# Patient Record
Sex: MALE | Race: WHITE | Employment: OTHER | URBAN - METROPOLITAN AREA
[De-identification: names, ages, dates, MRNs, and addresses within clinical notes are randomized per-mention and may not be internally consistent; named-entity substitution may affect disease eponyms.]

---

## 2017-09-07 PROBLEM — E78.2 MIXED HYPERLIPIDEMIA: Status: ACTIVE | Noted: 2017-09-07

## 2017-09-07 PROBLEM — G47.00 INSOMNIA: Status: ACTIVE | Noted: 2017-09-07

## 2017-09-07 PROBLEM — I10 ESSENTIAL HYPERTENSION: Status: ACTIVE | Noted: 2017-09-07

## 2017-10-10 PROBLEM — R07.81 RIB PAIN ON RIGHT SIDE: Status: ACTIVE | Noted: 2017-10-10

## 2017-10-10 PROBLEM — R10.11 RUQ ABDOMINAL PAIN: Status: ACTIVE | Noted: 2017-10-10

## 2017-10-10 PROBLEM — R05.3 CHRONIC COUGH: Status: ACTIVE | Noted: 2017-10-10

## 2017-11-30 PROBLEM — K57.30 DIVERTICULOSIS OF COLON: Status: ACTIVE | Noted: 2017-11-30

## 2018-02-27 PROBLEM — G89.29 CHRONIC NONINTRACTABLE HEADACHE: Status: ACTIVE | Noted: 2018-02-27

## 2018-02-27 PROBLEM — R51.9 CHRONIC NONINTRACTABLE HEADACHE: Status: ACTIVE | Noted: 2018-02-27

## 2018-02-27 PROBLEM — R05.3 CHRONIC COUGH: Status: RESOLVED | Noted: 2017-10-10 | Resolved: 2018-02-27

## 2018-04-03 PROBLEM — G89.29 CHRONIC NONINTRACTABLE HEADACHE: Status: RESOLVED | Noted: 2018-02-27 | Resolved: 2018-04-03

## 2018-04-03 PROBLEM — R51.9 CHRONIC NONINTRACTABLE HEADACHE: Status: RESOLVED | Noted: 2018-02-27 | Resolved: 2018-04-03

## 2018-05-03 PROBLEM — I73.9 PAD (PERIPHERAL ARTERY DISEASE) (HCC): Status: ACTIVE | Noted: 2018-05-03

## 2018-05-03 PROBLEM — R10.11 RUQ ABDOMINAL PAIN: Status: RESOLVED | Noted: 2017-10-10 | Resolved: 2018-05-03

## 2018-05-03 PROBLEM — M54.10 RADICULOPATHY OF LEG: Status: ACTIVE | Noted: 2018-05-03

## 2018-07-23 ENCOUNTER — OFFICE VISIT (OUTPATIENT)
Dept: GASTROENTEROLOGY | Age: 68
End: 2018-07-23
Payer: COMMERCIAL

## 2018-07-23 VITALS
BODY MASS INDEX: 33.1 KG/M2 | HEART RATE: 77 BPM | OXYGEN SATURATION: 98 % | DIASTOLIC BLOOD PRESSURE: 76 MMHG | WEIGHT: 223.5 LBS | SYSTOLIC BLOOD PRESSURE: 144 MMHG | HEIGHT: 69 IN

## 2018-07-23 DIAGNOSIS — R63.4 WEIGHT LOSS: ICD-10-CM

## 2018-07-23 DIAGNOSIS — K57.30 DIVERTICULOSIS OF COLON: Primary | ICD-10-CM

## 2018-07-23 DIAGNOSIS — R10.30 LOWER ABDOMINAL PAIN: ICD-10-CM

## 2018-07-23 PROBLEM — R10.9 ABDOMINAL PAIN: Status: ACTIVE | Noted: 2018-07-23

## 2018-07-23 PROCEDURE — 99204 OFFICE O/P NEW MOD 45 MIN: CPT | Performed by: INTERNAL MEDICINE

## 2018-07-23 ASSESSMENT — ENCOUNTER SYMPTOMS
ABDOMINAL PAIN: 1
COLOR CHANGE: 0
CONSTIPATION: 1
CHEST TIGHTNESS: 0
DIARRHEA: 1
TROUBLE SWALLOWING: 0
BACK PAIN: 1
BLOOD IN STOOL: 0
ABDOMINAL DISTENTION: 1
SORE THROAT: 1
STRIDOR: 0
SHORTNESS OF BREATH: 0
SINUS PAIN: 1
VOMITING: 0
COUGH: 1
NAUSEA: 0
SINUS PRESSURE: 1

## 2018-07-23 NOTE — PROGRESS NOTES
Subjective:      Patient ID: Gabby Lau is a 69 y.o. male. HPI   Dr. Chemo Marie PA-C our mutual patient Gabby Lau was seen  for   1. Diverticulosis of colon    2. Weight loss    3. Lower abdominal pain     . Mr. Lashawn Rivera, 64-year-old gentleman seen in the office with the symptoms of abdominal pain, weight loss. Patient states that he is having pelvic pain in the last 2-3 months. The pain appears to be crampy, constant. The pain is relieved by bowel movements. He does have constipation. No melena hematochezia. No fever or chills. Has nausea. Also has abdominal bloating and feeling of excessive gas. Recently he has weight loss of about 25 pounds. He has a good appetite. No dysphagia. No dyspepsia. Denies urinary symptoms. No fever or chills. His a previous records reviewed and this patient was seen by me last time about 3 years ago. In 2015 he had a colonoscopy done, and was found to have diverticulosis and hemorrhoids. In the last 3 years he was doing reasonably well until present symptoms. He has chronic disabling back pain. Past Medical, Family, and Social History reviewed and does contribute to the patient presenting condition. patient\"s PMH/PSH,SH,PSYCH hx, MEDs, ALLERGIES, and ROS was all reviewed and updated ion the appropriate sections      Review of Systems   Constitutional: Positive for unexpected weight change (LOSS OF 26 LBS IN TWO MONTHS). Negative for chills, fatigue and fever. HENT: Positive for sinus pain, sinus pressure and sore throat. Negative for dental problem (dentures), tinnitus and trouble swallowing. Eyes: Positive for visual disturbance (glasses). Respiratory: Positive for cough. Negative for chest tightness, shortness of breath and stridor. Cardiovascular: Positive for leg swelling (bilateral ankles). Negative for chest pain and palpitations.    Gastrointestinal: Positive for abdominal distention, abdominal pain, constipation and diarrhea. Negative for blood in stool, nausea and vomiting. Endocrine: Negative for cold intolerance and heat intolerance. Genitourinary: Positive for frequency and urgency. Negative for difficulty urinating. Musculoskeletal: Positive for arthralgias, back pain, neck pain and neck stiffness. Skin: Negative for color change, rash and wound. Allergic/Immunologic: Negative for environmental allergies and food allergies. Neurological: Positive for headaches (sinus). Negative for tremors, seizures, syncope, weakness, light-headedness and numbness. Hematological: Bruises/bleeds easily. Psychiatric/Behavioral: Negative for sleep disturbance. The patient is nervous/anxious. Objective:   Physical Exam   Constitutional: He is oriented to person, place, and time. He appears well-developed and well-nourished. No distress. Moderately overweight patient. HENT:   Head: Normocephalic and atraumatic. Mouth/Throat: No oropharyngeal exudate. Eyes: Conjunctivae are normal. Pupils are equal, round, and reactive to light. No scleral icterus. Neck: Normal range of motion. Neck supple. No tracheal deviation present. No thyromegaly present. Cardiovascular: Normal rate, regular rhythm, normal heart sounds and intact distal pulses. No murmur heard. Pulmonary/Chest: Effort normal and breath sounds normal. No respiratory distress. He has no wheezes. He has no rales. Abdominal: Soft. Bowel sounds are normal. He exhibits no distension, no ascites and no mass. There is no hepatomegaly. There is no tenderness. There is no guarding. No hernia. Obese abdomen. No peripheral signs of ch. Liver disease   Genitourinary: Rectum normal. Guaiac stool: no mass felt in the rectum. Stool negative for occult blood. Musculoskeletal: He exhibits no edema. Lymphadenopathy:     He has no cervical adenopathy. Neurological: He is alert and oriented to person, place, and time.  No cranial nerve

## 2018-07-30 ENCOUNTER — TELEPHONE (OUTPATIENT)
Dept: GASTROENTEROLOGY | Age: 68
End: 2018-07-30

## 2018-08-03 PROBLEM — R51.9 CHRONIC DAILY HEADACHE: Status: ACTIVE | Noted: 2018-08-03

## 2018-08-17 ENCOUNTER — OFFICE VISIT (OUTPATIENT)
Dept: GASTROENTEROLOGY | Age: 68
End: 2018-08-17
Payer: COMMERCIAL

## 2018-08-17 VITALS
DIASTOLIC BLOOD PRESSURE: 79 MMHG | SYSTOLIC BLOOD PRESSURE: 135 MMHG | WEIGHT: 241 LBS | HEART RATE: 100 BPM | OXYGEN SATURATION: 97 % | BODY MASS INDEX: 35.7 KG/M2 | HEIGHT: 69 IN

## 2018-08-17 DIAGNOSIS — R10.30 LOWER ABDOMINAL PAIN: ICD-10-CM

## 2018-08-17 DIAGNOSIS — K57.30 DIVERTICULOSIS OF COLON: ICD-10-CM

## 2018-08-17 DIAGNOSIS — R63.4 WEIGHT LOSS: ICD-10-CM

## 2018-08-17 DIAGNOSIS — K21.9 GASTROESOPHAGEAL REFLUX DISEASE, ESOPHAGITIS PRESENCE NOT SPECIFIED: Primary | ICD-10-CM

## 2018-08-17 PROCEDURE — 99214 OFFICE O/P EST MOD 30 MIN: CPT | Performed by: INTERNAL MEDICINE

## 2018-08-17 ASSESSMENT — ENCOUNTER SYMPTOMS
ABDOMINAL PAIN: 1
ABDOMINAL DISTENTION: 1
SORE THROAT: 1
ANAL BLEEDING: 0
DIARRHEA: 1
RECTAL PAIN: 0
COUGH: 1
SINUS PRESSURE: 1
CONSTIPATION: 1
VOMITING: 0
SINUS PAIN: 1
BLOOD IN STOOL: 0
BACK PAIN: 1
NAUSEA: 0

## 2018-08-17 NOTE — PROGRESS NOTES
Objective:   Physical Exam   Constitutional: He is oriented to person, place, and time. He appears well-developed and well-nourished. No distress. HENT:   Head: Normocephalic and atraumatic. Mouth/Throat: No oropharyngeal exudate. Eyes: Pupils are equal, round, and reactive to light. Conjunctivae are normal. No scleral icterus. Neck: Normal range of motion. Neck supple. No tracheal deviation present. No thyromegaly present. Cardiovascular: Normal rate, regular rhythm, normal heart sounds and intact distal pulses. No murmur heard. Pulmonary/Chest: Effort normal and breath sounds normal. No respiratory distress. He has no wheezes. He has no rales. Abdominal: Soft. Bowel sounds are normal. He exhibits no distension, no ascites and no mass. There is no hepatomegaly. There is no tenderness. There is no guarding. No hernia. No peripheral signs of ch. Liver disease   Genitourinary: Rectum normal.   Musculoskeletal: He exhibits no edema. Walks with a cane/walker. Lymphadenopathy:     He has no cervical adenopathy. Neurological: He is alert and oriented to person, place, and time. No cranial nerve deficit. Skin: Skin is warm and dry. No rash noted. No erythema. Psychiatric: Thought content normal.   Nursing note and vitals reviewed. Assessment:       Diagnosis Orders   1. Gastroesophageal reflux disease, esophagitis presence not specified     2. Diverticulosis of colon     3. Lower abdominal pain     4. Weight loss             Plan: At present patient is new symptom is that he has a good. I advised him to follow antireflux measures. Advised to increase PPI therapy. We'll watch him in the next 6-8 weeks to see the improvement. His weight loss appears to be stabilized. In fact he gained some weight. His pain in the pelvis better. However he continued to have some discomfort. Discussed with the patient and wife regarding this.   If he continued to have left flank and pelvic pain, he may need colonoscopy and decide whether he needs sigmoid colectomy. My thinking is that his symptom is secondary to diverticular disease. He might have developed mild stricture as well. After discussion they understood and they want to wait 2-3 months to see how he does. In between if he gets worsening of the symptoms they will be in touch with me. Otherwise in 3 months we'll decide further management.         Lou Betancourt MA

## 2018-12-13 ENCOUNTER — TELEPHONE (OUTPATIENT)
Dept: PRIMARY CARE CLINIC | Age: 68
End: 2018-12-13

## 2018-12-13 ENCOUNTER — OFFICE VISIT (OUTPATIENT)
Dept: GASTROENTEROLOGY | Age: 68
End: 2018-12-13
Payer: COMMERCIAL

## 2018-12-13 VITALS
HEART RATE: 81 BPM | BODY MASS INDEX: 35.59 KG/M2 | SYSTOLIC BLOOD PRESSURE: 159 MMHG | DIASTOLIC BLOOD PRESSURE: 91 MMHG | WEIGHT: 241 LBS

## 2018-12-13 DIAGNOSIS — K21.9 GASTROESOPHAGEAL REFLUX DISEASE, ESOPHAGITIS PRESENCE NOT SPECIFIED: ICD-10-CM

## 2018-12-13 DIAGNOSIS — K57.30 DIVERTICULOSIS OF COLON: Primary | ICD-10-CM

## 2018-12-13 DIAGNOSIS — R63.4 WEIGHT LOSS: ICD-10-CM

## 2018-12-13 PROCEDURE — 99214 OFFICE O/P EST MOD 30 MIN: CPT | Performed by: INTERNAL MEDICINE

## 2018-12-13 RX ORDER — LORAZEPAM 0.5 MG/1
0.5 TABLET ORAL EVERY 6 HOURS PRN
COMMUNITY
End: 2019-01-28 | Stop reason: SDUPTHER

## 2018-12-13 ASSESSMENT — ENCOUNTER SYMPTOMS
NAUSEA: 0
BACK PAIN: 1
ABDOMINAL DISTENTION: 0
SINUS PRESSURE: 1
BLOOD IN STOOL: 0
RECTAL PAIN: 0
CONSTIPATION: 0
SINUS PAIN: 1
DIARRHEA: 0
COUGH: 1
VOMITING: 0
ABDOMINAL PAIN: 0
ANAL BLEEDING: 0
SORE THROAT: 1

## 2018-12-13 NOTE — TELEPHONE ENCOUNTER
Please call back with BP that is taken at Dr. Gage Nip office today and take the cuff they have to check it against the BP there at his office. If still that high and feels ok, I would like to see tomorrow. If that high and dizzy, weak on one side, chest pain, SOB, ets, go to ER.

## 2018-12-20 ENCOUNTER — TELEPHONE (OUTPATIENT)
Dept: GASTROENTEROLOGY | Age: 68
End: 2018-12-20

## 2019-01-22 RX ORDER — ALLOPURINOL 300 MG/1
TABLET ORAL
Qty: 22 TABLET | Refills: 5 | Status: SHIPPED | OUTPATIENT
Start: 2019-01-22 | End: 2019-06-17 | Stop reason: SDUPTHER

## 2019-01-24 DIAGNOSIS — K20.80 CORROSIVE ESOPHAGITIS: ICD-10-CM

## 2019-01-24 RX ORDER — OMEPRAZOLE 20 MG/1
CAPSULE, DELAYED RELEASE ORAL
Qty: 90 CAPSULE | Refills: 1 | Status: SHIPPED | OUTPATIENT
Start: 2019-01-24 | End: 2019-11-22 | Stop reason: SDUPTHER

## 2019-01-28 DIAGNOSIS — F41.9 ANXIETY: Primary | ICD-10-CM

## 2019-01-28 RX ORDER — LORAZEPAM 0.5 MG/1
0.5 TABLET ORAL EVERY 6 HOURS PRN
Qty: 30 TABLET | Refills: 0 | Status: SHIPPED | OUTPATIENT
Start: 2019-01-28 | End: 2019-06-17 | Stop reason: SDUPTHER

## 2019-01-28 RX ORDER — CLOPIDOGREL BISULFATE 75 MG/1
75 TABLET ORAL DAILY
Qty: 90 TABLET | Refills: 3 | Status: SHIPPED | OUTPATIENT
Start: 2019-01-28 | End: 2020-01-03 | Stop reason: SDUPTHER

## 2019-03-13 RX ORDER — CITALOPRAM 10 MG/1
TABLET ORAL
Qty: 30 TABLET | Refills: 5 | Status: SHIPPED | OUTPATIENT
Start: 2019-03-13 | End: 2019-08-29 | Stop reason: ALTCHOICE

## 2019-03-22 DIAGNOSIS — R20.2 PARESTHESIA OF LEFT LEG: ICD-10-CM

## 2019-03-22 DIAGNOSIS — M79.605 LEG PAIN, LATERAL, LEFT: ICD-10-CM

## 2019-03-22 DIAGNOSIS — M51.36 DISC DEGENERATION, LUMBAR: ICD-10-CM

## 2019-03-26 ENCOUNTER — OFFICE VISIT (OUTPATIENT)
Dept: PODIATRY | Age: 69
End: 2019-03-26
Payer: COMMERCIAL

## 2019-03-26 VITALS — BODY MASS INDEX: 35.55 KG/M2 | WEIGHT: 240 LBS | HEIGHT: 69 IN

## 2019-03-26 DIAGNOSIS — M79.604 BILATERAL LOWER EXTREMITY PAIN: ICD-10-CM

## 2019-03-26 DIAGNOSIS — M79.605 BILATERAL LOWER EXTREMITY PAIN: ICD-10-CM

## 2019-03-26 DIAGNOSIS — L60.0 INGROWN NAIL: ICD-10-CM

## 2019-03-26 DIAGNOSIS — I73.9 PVD (PERIPHERAL VASCULAR DISEASE) (HCC): ICD-10-CM

## 2019-03-26 DIAGNOSIS — B35.1 DERMATOPHYTOSIS OF NAIL: Primary | ICD-10-CM

## 2019-03-26 PROBLEM — R94.31 ABNORMAL EKG: Status: ACTIVE | Noted: 2018-12-06

## 2019-03-26 PROBLEM — R42 DIZZINESS: Status: ACTIVE | Noted: 2018-12-06

## 2019-03-26 PROBLEM — R06.02 SOB (SHORTNESS OF BREATH): Status: ACTIVE | Noted: 2018-12-06

## 2019-03-26 PROCEDURE — 99213 OFFICE O/P EST LOW 20 MIN: CPT | Performed by: PODIATRIST

## 2019-03-26 PROCEDURE — 11721 DEBRIDE NAIL 6 OR MORE: CPT | Performed by: PODIATRIST

## 2019-03-26 RX ORDER — DOCUSATE SODIUM 100 MG/1
100 CAPSULE, LIQUID FILLED ORAL PRN
COMMUNITY
End: 2020-03-12

## 2019-03-26 RX ORDER — LORAZEPAM 0.5 MG/1
0.5 TABLET ORAL
COMMUNITY
End: 2019-06-18 | Stop reason: SDUPTHER

## 2019-03-26 RX ORDER — TOPIRAMATE 25 MG/1
TABLET ORAL
Refills: 5 | COMMUNITY
Start: 2019-01-24 | End: 2019-04-16

## 2019-04-11 ENCOUNTER — OFFICE VISIT (OUTPATIENT)
Dept: GASTROENTEROLOGY | Age: 69
End: 2019-04-11
Payer: COMMERCIAL

## 2019-04-11 ENCOUNTER — TELEPHONE (OUTPATIENT)
Dept: GASTROENTEROLOGY | Age: 69
End: 2019-04-11

## 2019-04-11 VITALS
DIASTOLIC BLOOD PRESSURE: 71 MMHG | BODY MASS INDEX: 36.88 KG/M2 | SYSTOLIC BLOOD PRESSURE: 143 MMHG | WEIGHT: 249 LBS | HEIGHT: 69 IN | HEART RATE: 92 BPM

## 2019-04-11 DIAGNOSIS — R10.11 ABDOMINAL PAIN, RIGHT UPPER QUADRANT: ICD-10-CM

## 2019-04-11 DIAGNOSIS — K21.9 GASTROESOPHAGEAL REFLUX DISEASE, ESOPHAGITIS PRESENCE NOT SPECIFIED: Primary | ICD-10-CM

## 2019-04-11 DIAGNOSIS — R10.30 LOWER ABDOMINAL PAIN: ICD-10-CM

## 2019-04-11 PROCEDURE — 99214 OFFICE O/P EST MOD 30 MIN: CPT | Performed by: INTERNAL MEDICINE

## 2019-04-11 ASSESSMENT — ENCOUNTER SYMPTOMS
DIARRHEA: 0
TROUBLE SWALLOWING: 0
EYE PAIN: 0
CONSTIPATION: 0
SINUS PAIN: 0
EYE REDNESS: 0
BACK PAIN: 1
NAUSEA: 0
SINUS PRESSURE: 0
ABDOMINAL PAIN: 1
VOMITING: 0
WHEEZING: 0
ANAL BLEEDING: 0
CHEST TIGHTNESS: 0
BLOOD IN STOOL: 0
SHORTNESS OF BREATH: 0
RECTAL PAIN: 0
ABDOMINAL DISTENTION: 1

## 2019-04-11 NOTE — TELEPHONE ENCOUNTER
Patient is scheduled for [place] [facility] [place] at [address] PAT on 04/15/19 at 10AM. Patient is on Plavix and was unsure of who prescribes. He will call us with the name of the prescribing provider.

## 2019-04-11 NOTE — PROGRESS NOTES
Subjective:      Patient ID: Natacha Mitchell is a 77 y.o. male. HPI     Dr. Clarissa Noel PA-C our mutual patient Natacha Mitchell was seen  for No diagnosis found. .  Patient seen along with his wife. Last time he was seen by me was in December 2018. Patient continued to have left flank pain. This pain is not related to micturition. No radiation. He does have mild constipation. Also known to have diverticulosis. In the past it was felt that he may have diverticulitis causing this symptom. However there is no history of fever or chills. No melena or hematochezia. His previous records reviewed and he had a colonoscopy done in 2012 and at that time he was found to have colon polyps. He denies abdominal distention bloating etc.  Tolerating diet well. We also has chronic GERD symptoms. No dysphagia. No symptoms suggestive of extra esophageal manifestation of GERD. Past Medical, Family, and Social History reviewed and does contribute to the patient presenting condition. patient\"s PMH/PSH,SH,PSYCH hx, MEDs, ALLERGIES, and ROS was all reviewed and updated ion the appropriate sections        Review of Systems   Constitutional: Positive for appetite change (decreased) and unexpected weight change (decreased). Negative for fatigue. HENT: Negative for sinus pressure, sinus pain and trouble swallowing. Eyes: Positive for visual disturbance (glasses). Negative for pain and redness. Respiratory: Negative for chest tightness, shortness of breath and wheezing. Cardiovascular: Negative for chest pain, palpitations and leg swelling. Gastrointestinal: Positive for abdominal distention and abdominal pain. Negative for anal bleeding, blood in stool, constipation, diarrhea, nausea, rectal pain and vomiting. Endocrine: Negative for cold intolerance and heat intolerance. Genitourinary: Positive for frequency and urgency. Negative for difficulty urinating.    Musculoskeletal: Positive for arthralgias, back pain and neck pain. Skin: Negative. Allergic/Immunologic: Negative for environmental allergies and food allergies. Neurological: Positive for headaches. Negative for dizziness and weakness. Hematological: Bruises/bleeds easily. Psychiatric/Behavioral: Negative for agitation and sleep disturbance. The patient is nervous/anxious. Objective:   Physical Exam   Constitutional: He is oriented to person, place, and time. He appears well-developed and well-nourished. No distress. Patient is mildly overweight. HENT:   Head: Normocephalic and atraumatic. Mouth/Throat: No oropharyngeal exudate. Eyes: Pupils are equal, round, and reactive to light. Conjunctivae are normal. No scleral icterus. Neck: Normal range of motion. Neck supple. No tracheal deviation present. No thyromegaly present. Cardiovascular: Normal rate, regular rhythm, normal heart sounds and intact distal pulses. No murmur heard. Pulmonary/Chest: Effort normal and breath sounds normal. No respiratory distress. He has no wheezes. He has no rales. Abdominal: Soft. Bowel sounds are normal. He exhibits no distension, no ascites and no mass. There is no hepatomegaly. There is no tenderness. There is no guarding. No hernia. No peripheral signs of ch. Liver disease mildly obese abdomen. Genitourinary: Rectum normal.   Musculoskeletal: He exhibits no edema. Patient has issues with gait and uses cane. Lymphadenopathy:     He has no cervical adenopathy. Neurological: He is alert and oriented to person, place, and time. No cranial nerve deficit. Skin: Skin is warm and dry. No rash noted. No erythema. Psychiatric: Thought content normal.   Nursing note and vitals reviewed. Assessment:       Diagnosis Orders   1. Gastroesophageal reflux disease, esophagitis presence not specified  EGD   2.  Lower abdominal pain  COLONOSCOPY W/ OR W/O BIOPSY   3. Abdominal pain, right upper quadrant  US Liver    Lipase Hepatic Function Panel    CBC Auto Differential           Plan:      Advised to have labs as outlined above to further evaluate and manage abdominal pain. Given his symptoms, past history of colon polyps, he may need colonoscopy to evaluate sigmoid pathology, recurrent polyps. Also as he has a chronic GERD, we'll schedule him for EGD as well. Discussed with the patient and family regarding this, he understood and requested this investigation.

## 2019-04-12 RX ORDER — POLYETHYLENE GLYCOL 3350 17 G/17G
POWDER, FOR SOLUTION ORAL
Qty: 255 G | Refills: 0 | Status: SHIPPED | OUTPATIENT
Start: 2019-04-12 | End: 2019-04-16

## 2019-04-16 ENCOUNTER — OFFICE VISIT (OUTPATIENT)
Dept: PRIMARY CARE CLINIC | Age: 69
End: 2019-04-16
Payer: COMMERCIAL

## 2019-04-16 VITALS
SYSTOLIC BLOOD PRESSURE: 178 MMHG | HEART RATE: 63 BPM | WEIGHT: 249.8 LBS | BODY MASS INDEX: 36.89 KG/M2 | DIASTOLIC BLOOD PRESSURE: 92 MMHG | OXYGEN SATURATION: 97 %

## 2019-04-16 DIAGNOSIS — R14.0 BLOATING: ICD-10-CM

## 2019-04-16 DIAGNOSIS — R10.30 LOWER ABDOMINAL PAIN: ICD-10-CM

## 2019-04-16 DIAGNOSIS — R51.9 CHRONIC DAILY HEADACHE: Primary | ICD-10-CM

## 2019-04-16 DIAGNOSIS — I10 ESSENTIAL HYPERTENSION: ICD-10-CM

## 2019-04-16 PROCEDURE — 99214 OFFICE O/P EST MOD 30 MIN: CPT | Performed by: PHYSICIAN ASSISTANT

## 2019-04-16 RX ORDER — TOPIRAMATE 25 MG/1
25 TABLET ORAL NIGHTLY
Qty: 90 TABLET | Refills: 1 | Status: SHIPPED | OUTPATIENT
Start: 2019-04-16 | End: 2019-05-29

## 2019-04-16 RX ORDER — LISINOPRIL 20 MG/1
TABLET ORAL
Qty: 30 TABLET | Refills: 1 | Status: SHIPPED | OUTPATIENT
Start: 2019-04-16 | End: 2019-05-29 | Stop reason: DRUGHIGH

## 2019-04-16 ASSESSMENT — ENCOUNTER SYMPTOMS
SINUS PAIN: 0
CONSTIPATION: 0
COLOR CHANGE: 0
WHEEZING: 0
COUGH: 0
SINUS PRESSURE: 0
APNEA: 0
DIARRHEA: 0
ABDOMINAL DISTENTION: 1
CHEST TIGHTNESS: 0
NAUSEA: 0
VOMITING: 0
ABDOMINAL PAIN: 0
SHORTNESS OF BREATH: 1

## 2019-04-16 ASSESSMENT — PATIENT HEALTH QUESTIONNAIRE - PHQ9
SUM OF ALL RESPONSES TO PHQ QUESTIONS 1-9: 2
1. LITTLE INTEREST OR PLEASURE IN DOING THINGS: 1
2. FEELING DOWN, DEPRESSED OR HOPELESS: 1
SUM OF ALL RESPONSES TO PHQ9 QUESTIONS 1 & 2: 2
SUM OF ALL RESPONSES TO PHQ QUESTIONS 1-9: 2

## 2019-04-18 ENCOUNTER — TELEPHONE (OUTPATIENT)
Dept: GASTROENTEROLOGY | Age: 69
End: 2019-04-18

## 2019-04-18 NOTE — TELEPHONE ENCOUNTER
Writer spoke with patient, he confirmed that he has stopped Plavix on his on. Last dose was taken on Monday 4/15/19. Patient is planning on attending procedure appointment on Monday, has no questions regarding bowel prep and understands arrival time.

## 2019-04-18 NOTE — TELEPHONE ENCOUNTER
Pt wife calling to ask that a note from PCP be faxed over to Dr. Francine Cisneros stating that it is ok to stop his Plavix at [phone] for note?  Please advise     Please call Pt wife back at [phone]

## 2019-04-18 NOTE — TELEPHONE ENCOUNTER
Plavix does not need to be stopped for endoscopic procedures. If Dr. Lauren Waller feels the need to hold it, it is ok to be stopped for a couple days and then restarted day after procedure. Please let wife know this.

## 2019-04-18 NOTE — TELEPHONE ENCOUNTER
Pt wife notified, Pt wife states that Pt has already stopped Plavix on 4/15/19. Pt wife wanted to make sure that a note could and would be faxed to dr. Horace Wolff stating that it is ok to be stopped for a couple of days and then restarted the day after the procedure

## 2019-05-13 ENCOUNTER — TELEPHONE (OUTPATIENT)
Dept: GASTROENTEROLOGY | Age: 69
End: 2019-05-13

## 2019-05-13 NOTE — TELEPHONE ENCOUNTER
received a voicemail from the patients wife wanting to discuss his BX results and Ct scan results done at [facility].  contacted the patients EC and informed her that Dr. Kyrie Machuca hasn't gotten a chance to review the results yet, however, I do see from the note that the provider would like him to follow up in the office in 3-4 weeks.  states to please contact the office to schedule a follow up appointment.

## 2019-05-28 ENCOUNTER — OFFICE VISIT (OUTPATIENT)
Dept: PODIATRY | Age: 69
End: 2019-05-28
Payer: COMMERCIAL

## 2019-05-28 VITALS — WEIGHT: 240 LBS | BODY MASS INDEX: 35.55 KG/M2 | HEIGHT: 69 IN

## 2019-05-28 DIAGNOSIS — M79.604 BILATERAL LOWER EXTREMITY PAIN: ICD-10-CM

## 2019-05-28 DIAGNOSIS — I73.9 PVD (PERIPHERAL VASCULAR DISEASE) (HCC): ICD-10-CM

## 2019-05-28 DIAGNOSIS — L60.0 INGROWN NAIL: ICD-10-CM

## 2019-05-28 DIAGNOSIS — M79.605 BILATERAL LOWER EXTREMITY PAIN: ICD-10-CM

## 2019-05-28 DIAGNOSIS — B35.1 DERMATOPHYTOSIS OF NAIL: Primary | ICD-10-CM

## 2019-05-28 PROCEDURE — 11721 DEBRIDE NAIL 6 OR MORE: CPT | Performed by: PODIATRIST

## 2019-05-28 NOTE — PROGRESS NOTES
Left    Thickness  [x] [x] [x] [x] [x] [x] [x] [x] [x] [x]  5 4 3 2 1 1 2 3 4 5                         Right                                        Left    Dystrophic Changes   [x] [x] [x] [x] [x] [x] [x] [x] [x] [x]  5 4 3 2 1 1 2 3 4 5                         Right                                        Left    Color  [x] [x] [x] [x] [x] [x] [x] [x] [x] [x]  5 4 3 2 1 1 2 3 4 5                          Right                                        Left    Incurvation/Ingrowin   [] [] [] [] [] [] [] [] [] []  5 4 3 2 1 1 2 3 4 5                         Right                                        Left    Inflammation/Pain   [x] [x] [x] [x] [x] [x] [x] [x] [x] [x]  5 4 3  2 1 1 2 3 4 5                         Right                                        Left       Nails are painful 1-10 with direct palpation. Dermatologic:  No skin lesions present. Dry scaly skin noted to the plantar surface of the right and left foot. Musculoskeletal:     1st MPJ ROM decreased, Bilateral.  Muscle strength 5/5, Bilateral.  Pain present upon palpation of toenails 1-5, Bilateral. decreased medial longitudinal arch, Bilateral.  Ankle ROM decreased,Bilateral.    Dorsally contracted digits present digits 2, Bilateral.     Vascular: DP and PT pulses palpable 1/4, Bilateral.  CFT <5 seconds, Bilateral.  Hair growth absent to the level of the digits, Bilateral.  Edema present, Bilateral.  Varicosities absent, Bilateral. Erythema absent, Bilateral    Integument: Warm, dry, supple, Bilateral.  Open lesion absent, Bilateral.  Interdigital maceration absent to web spaces 4, Bilateral.  Nails 1-5 left and 1-5 right thickened > 3.0 mm, dystrophic and crumbly, discolored with subungual debris. Fissures absent, Bilateral.   Neurological:  Sensation present to light touch to level of digits, Bilateral.      Assessment:  77 y.o. male with:    Diagnosis Orders   1. Dermatophytosis of nail  [address]01 - NM DEBRIDEMENT OF NAILS, 6 OR MORE   2.

## 2019-05-29 ENCOUNTER — OFFICE VISIT (OUTPATIENT)
Dept: PRIMARY CARE CLINIC | Age: 69
End: 2019-05-29
Payer: COMMERCIAL

## 2019-05-29 VITALS
DIASTOLIC BLOOD PRESSURE: 74 MMHG | OXYGEN SATURATION: 95 % | HEART RATE: 104 BPM | SYSTOLIC BLOOD PRESSURE: 144 MMHG | BODY MASS INDEX: 36.86 KG/M2 | WEIGHT: 249.6 LBS

## 2019-05-29 DIAGNOSIS — I10 ESSENTIAL HYPERTENSION: Primary | ICD-10-CM

## 2019-05-29 DIAGNOSIS — M50.30 DDD (DEGENERATIVE DISC DISEASE), CERVICAL: ICD-10-CM

## 2019-05-29 PROCEDURE — 99213 OFFICE O/P EST LOW 20 MIN: CPT | Performed by: PHYSICIAN ASSISTANT

## 2019-05-29 RX ORDER — LISINOPRIL 10 MG/1
TABLET ORAL
Qty: 30 TABLET | Refills: 5 | Status: SHIPPED | OUTPATIENT
Start: 2019-05-29 | End: 2021-07-14 | Stop reason: SDUPTHER

## 2019-05-29 RX ORDER — TOPIRAMATE 25 MG/1
25 TABLET ORAL NIGHTLY
Qty: 90 TABLET | Refills: 1 | Status: SHIPPED | OUTPATIENT
Start: 2019-05-29 | End: 2019-08-22 | Stop reason: SDUPTHER

## 2019-05-29 NOTE — PROGRESS NOTES
[address] [facility] PRIMARY [facility]ARE  [id number] [facility]  Dept: [address] Ces[address] is a 77 y.o. male who presents today for his medical conditions/complaintsas noted below. Chief Complaint   Patient presents with    Headache     getting botox injections. a little improvement, not much. getting pains in the back of the neck and feeling cloudy.  Dizziness     not getting better    Hypertension     has been low. diastolic in the 58O one time. HPI:     HPI  Just had some Botox shots  Stopped the Topamax because he thought it wasn't working. Never had MRI--he is afraid of claustrophobia   HA 2/10 today--took Tylenol  Scanned BPs reviewed    LDL Calculated (mg/dL)   Date Value   2017 84   2016 86       (goal LDL is <100)   BUN (mg/dL)   Date Value   2012 8     BP Readings from Last 3 Encounters:   19 (!) 152/82   19 (!) 178/92   19 (!) 143/71          (goal 120/80)    Past Medical History:   Diagnosis Date    Anxiety     Hyperlipidemia     Hypertension       Past Surgical History:   Procedure Laterality Date    APPENDECTOMY      BACK SURGERY      COLONOSCOPY  3/7/2012    tubular adenoma, hyperplastic polyp    JOINT REPLACEMENT      hip    OTHER SURGICAL HISTORY  2016    Spinal stimulator       Family History   Problem Relation Age of Onset    Heart Disease Father     Hypertension Father     Hypertension Brother     Stroke Brother     Tuberculosis Brother        Social History     Tobacco Use    Smoking status: Former Smoker     Packs/day: 2.00     Years: 35.00     Pack years: 70.00     Types: Cigarettes     Last attempt to quit: 2005     Years since quittin.5    Smokeless tobacco: Former User     Types: Chew   Substance Use Topics    Alcohol use:  Yes     Alcohol/week: 21.0 - 25.2 oz     Types: 35 - 42 Cans of beer per week     Comment: 6 pack daily      Current Outpatient Medications   Medication Sig Dispense Refill    lisinopril (PRINIVIL;ZESTRIL) 10 MG tablet TAKE 1 TABLET BY MOUTH ONE TIME A DAY 30 tablet 5    topiramate (TOPAMAX) 25 MG tablet Take 1 tablet by mouth nightly For one week then 25mg bid for a week, then 50mg qam and 25mg q pm x 1 week, then 50mg 1 po bid. 90 tablet 1    diclofenac sodium 1 % GEL APPLY 2 GRAMS TO AFFECTED AREA FOUR TIMES DAILY  3    traZODone (DESYREL) 50 MG tablet TAKE 1 TABLET BY MOUTH NIGHTLY 30 tablet 5    bisacodyl (DULCOLAX) 5 MG EC tablet TAKE 4 TABS AT 10 AM DAY PRIOR TO COLONOSCOPY 4 tablet 0    LORazepam (ATIVAN) 0.5 MG tablet Take 0.5 mg by mouth.  docusate sodium (COLACE) 100 MG capsule Take 100 mg by mouth as needed       LYRICA 50 MG capsule TAKE 1 CAPSULE BY MOUTH THREE TIMES A DAY  (Patient taking differently: TAKE 1 CAPSULE BY MOUTH TWICE A DAY) 90 capsule 1    citalopram (CELEXA) 10 MG tablet TAKE 1 TABLET BY MOUTH ONE TIME A DAY 30 tablet 5    clopidogrel (PLAVIX) 75 MG tablet Take 1 tablet by mouth daily 90 tablet 3    omeprazole (PRILOSEC) 20 MG delayed release capsule TAKE 1 CAPSULE BY MOUTH ONE TIME A DAY 90 capsule 1    allopurinol (ZYLOPRIM) 300 MG tablet TAKE 1 TABLET BY MOUTH ONE TIME A DAY 22 tablet 5    atorvastatin (LIPITOR) 10 MG tablet TAKE 1 TABLET BY MOUTH ONE TIME A DAY  30 tablet 10    carvedilol (COREG) 6.25 MG tablet Take 1 tablet by mouth 2 times daily 60 tablet 0    mupirocin (BACTROBAN NASAL) 2 % nasal ointment Take by Nasal route 2 times daily. 1 Tube 3    Multiple Vitamins-Minerals (OCUVITE PRESERVISION PO) Take 1 tablet by mouth daily      Acetaminophen (TYLENOL EXTRA STRENGTH PO) Take by mouth Indications: prn      POTASSIUM GLUCONATE PO Take by mouth daily       No current facility-administered medications for this visit.       Allergies   Allergen Reactions    Cupric Oxide     Chrome Alum     Cobalt     Copper-Containing Compounds     Nickel        Health Maintenance   Topic Date Due    DTaP/Tdap/Td vaccine (1 - Tdap) 12/30/1967    Shingles Vaccine (1 of 2) 12/30/1998    Low dose CT lung screening  10/25/2018    Potassium monitoring  05/03/2019    Creatinine monitoring  05/03/2019    Diabetes screen  09/19/2020    Lipid screen  09/19/2022    Colon cancer screen colonoscopy  04/22/2029    Flu vaccine  Completed    Pneumococcal 65+ years Vaccine  Completed    AAA screen  Completed    Hepatitis C screen  Completed       Subjective:      Review of Systems    Objective:     BP (!) 152/82   Pulse 104   Wt 249 lb 9.6 oz (113.2 kg)   SpO2 95%   BMI 36.86 kg/m²   Physical Exam   Constitutional: He is oriented to person, place, and time. Cardiovascular: Normal rate, regular rhythm and normal heart sounds. Pulmonary/Chest: Effort normal and breath sounds normal.   Neurological: He is alert and oriented to person, place, and time. Vitals reviewed. Assessment:       Diagnosis Orders   1. Essential hypertension     2. Chronic migraine     3. DDD (degenerative disc disease), cervical          Plan:   MRI needed? Will call neurologist  Reduced the Lisinopril to 10mg 1 po qd and continue Coreg 6.25mg 1 po bid  CAll with BPs and pulses in 1 week. Please check his cuff against ours. Restart and finish titration of the Topamax  Return in about 3 months (around 8/29/2019) for HTN, HAs. No orders of the defined types were placed in this encounter. Orders Placed This Encounter   Medications    lisinopril (PRINIVIL;ZESTRIL) 10 MG tablet     Sig: TAKE 1 TABLET BY MOUTH ONE TIME A DAY     Dispense:  30 tablet     Refill:  5    topiramate (TOPAMAX) 25 MG tablet     Sig: Take 1 tablet by mouth nightly For one week then 25mg bid for a week, then 50mg qam and 25mg q pm x 1 week, then 50mg 1 po bid. Dispense:  90 tablet     Refill:  1       Patient given educationalmaterials - see patient instructions. Discussed use, benefit, and side effectsof prescribed medications.   All patient questions answered. Pt voiced understanding. Reviewed health maintenance. Instructed to continue current medications, diet andexercise. Patient agreed with treatment plan. Follow up as directed.      Electronicallysigned by Rosa Ochoa PA-C on 5/29/2019 at 2:36 PM

## 2019-05-31 ENCOUNTER — TELEPHONE (OUTPATIENT)
Dept: PRIMARY CARE CLINIC | Age: 69
End: 2019-05-31

## 2019-05-31 NOTE — TELEPHONE ENCOUNTER
Spoke with Dr. Mehrdad Russell and updated him on Daquan's daily HAs. He does not think it is necessary to have the MRI and mentions using a new preventative medication that he has samples for along with the titrated Topamax and current Botox injections. Called and informed Rocio Altamirano of the above and he will go back and see Dr. Mehrdad Russell.

## 2019-06-20 RX ORDER — ALLOPURINOL 300 MG/1
TABLET ORAL
Qty: 90 TABLET | Refills: 3 | Status: SHIPPED | OUTPATIENT
Start: 2019-06-20 | End: 2020-04-09 | Stop reason: SDUPTHER

## 2019-06-20 RX ORDER — ALLOPURINOL 300 MG/1
TABLET ORAL
Qty: 90 TABLET | Refills: 0 | OUTPATIENT
Start: 2019-06-20

## 2019-06-20 NOTE — TELEPHONE ENCOUNTER
Patient last seen 5/29/19  Patient called and asked if a 90 day supply of allopurinol 300mg could be called in, patient said he picked up script but only a 12 day supply was sent. Please fill or deny.    [facility] in [address] [place] [address]

## 2019-07-24 ENCOUNTER — TELEPHONE (OUTPATIENT)
Dept: PRIMARY CARE CLINIC | Age: 69
End: 2019-07-24

## 2019-07-26 DIAGNOSIS — M79.605 LEG PAIN, LATERAL, LEFT: ICD-10-CM

## 2019-07-26 DIAGNOSIS — R20.2 PARESTHESIA OF LEFT LEG: ICD-10-CM

## 2019-07-26 DIAGNOSIS — M51.36 DISC DEGENERATION, LUMBAR: ICD-10-CM

## 2019-07-26 RX ORDER — ATORVASTATIN CALCIUM 10 MG/1
TABLET, FILM COATED ORAL
Qty: 30 TABLET | Refills: 11 | Status: SHIPPED | OUTPATIENT
Start: 2019-07-26 | End: 2020-06-29 | Stop reason: SDUPTHER

## 2019-07-26 NOTE — TELEPHONE ENCOUNTER
Last seen 5/29/19  Scheduled appt - 8/29/19  [facility] in [address] [place] [address]

## 2019-07-30 ENCOUNTER — OFFICE VISIT (OUTPATIENT)
Dept: PODIATRY | Age: 69
End: 2019-07-30
Payer: COMMERCIAL

## 2019-07-30 VITALS — BODY MASS INDEX: 35.55 KG/M2 | HEIGHT: 69 IN | WEIGHT: 240 LBS

## 2019-07-30 DIAGNOSIS — M79.604 BILATERAL LOWER EXTREMITY PAIN: ICD-10-CM

## 2019-07-30 DIAGNOSIS — I73.9 PVD (PERIPHERAL VASCULAR DISEASE) (HCC): ICD-10-CM

## 2019-07-30 DIAGNOSIS — B35.1 DERMATOPHYTOSIS OF NAIL: Primary | ICD-10-CM

## 2019-07-30 DIAGNOSIS — M79.605 BILATERAL LOWER EXTREMITY PAIN: ICD-10-CM

## 2019-07-30 DIAGNOSIS — L60.0 INGROWN NAIL: ICD-10-CM

## 2019-07-30 PROCEDURE — 99999 PR OFFICE/OUTPT VISIT,PROCEDURE ONLY: CPT | Performed by: PODIATRIST

## 2019-07-30 PROCEDURE — 11721 DEBRIDE NAIL 6 OR MORE: CPT | Performed by: PODIATRIST

## 2019-08-29 ENCOUNTER — OFFICE VISIT (OUTPATIENT)
Dept: PRIMARY CARE CLINIC | Age: 69
End: 2019-08-29
Payer: COMMERCIAL

## 2019-08-29 VITALS
BODY MASS INDEX: 36.51 KG/M2 | WEIGHT: 247.2 LBS | SYSTOLIC BLOOD PRESSURE: 124 MMHG | HEART RATE: 64 BPM | OXYGEN SATURATION: 95 % | DIASTOLIC BLOOD PRESSURE: 66 MMHG

## 2019-08-29 DIAGNOSIS — F41.9 ANXIETY: ICD-10-CM

## 2019-08-29 DIAGNOSIS — R42 DIZZINESS: Primary | ICD-10-CM

## 2019-08-29 DIAGNOSIS — R51.9 CHRONIC DAILY HEADACHE: ICD-10-CM

## 2019-08-29 DIAGNOSIS — I10 ESSENTIAL HYPERTENSION: ICD-10-CM

## 2019-08-29 DIAGNOSIS — Z78.9 ALCOHOL USE: ICD-10-CM

## 2019-08-29 PROCEDURE — 99213 OFFICE O/P EST LOW 20 MIN: CPT | Performed by: PHYSICIAN ASSISTANT

## 2019-08-29 ASSESSMENT — ENCOUNTER SYMPTOMS: RESPIRATORY NEGATIVE: 1

## 2019-08-29 NOTE — PROGRESS NOTES
[address] [place] [address] PRIMARY CARE  [address] [facility] [address]  [address]pt: [address] is a 77 y.o. male who presents today for his medical conditions/complaintsas noted below. Chief Complaint   Patient presents with    Follow-up     still having intermittent HA. Still dizzy all day, wife tried weaning off of Topamax - down to 2 am 1 pm, but HA came back worse, went back to 2 am 2 pm. neuro now has him on emgality injections - will call with dose. HPI:     HPI  BP does get lower than 100/60 many times with home cuff  Everyday gets dizzy even w/o moving head. Off balance, not vertigo. No pattern to dizziness. Does not sleep well. Drinks 6 beers daily but not drunk  No HA yesterday  LDL Calculated (mg/dL)   Date Value   2017 84   2016 86       (goal LDL is <100)   BUN (mg/dL)   Date Value   2012 8     BP Readings from Last 3 Encounters:   19 124/66   19 (!) 144/74   19 (!) 178/92          (goal 120/80)    Past Medical History:   Diagnosis Date    Anxiety     Hyperlipidemia     Hypertension       Past Surgical History:   Procedure Laterality Date    APPENDECTOMY      BACK SURGERY      COLONOSCOPY  3/7/2012    tubular adenoma, hyperplastic polyp    JOINT REPLACEMENT      hip    OTHER SURGICAL HISTORY  2016    Spinal stimulator       Family History   Problem Relation Age of Onset    Heart Disease Father     Hypertension Father     Hypertension Brother     Stroke Brother     Tuberculosis Brother        Social History     Tobacco Use    Smoking status: Former Smoker     Packs/day: 2.00     Years: 35.00     Pack years: 70.00     Types: Cigarettes     Last attempt to quit: 2005     Years since quittin.8    Smokeless tobacco: Former User     Types: Chew   Substance Use Topics    Alcohol use:  Yes     Alcohol/week: 35.0 - 42.0 standard drinks     Types: 35 - 42 Cans of beer per week

## 2019-09-04 RX ORDER — TOPIRAMATE 25 MG/1
25 TABLET ORAL 2 TIMES DAILY
Qty: 180 TABLET | Refills: 1 | Status: SHIPPED | OUTPATIENT
Start: 2019-09-04 | End: 2019-09-09 | Stop reason: DRUGHIGH

## 2019-09-09 RX ORDER — TOPIRAMATE 50 MG/1
50 TABLET, FILM COATED ORAL 2 TIMES DAILY
Qty: 60 TABLET | Refills: 3 | Status: SHIPPED | OUTPATIENT
Start: 2019-09-09 | End: 2020-01-03 | Stop reason: SDUPTHER

## 2019-09-09 RX ORDER — TOPIRAMATE 50 MG/1
50 TABLET, FILM COATED ORAL 2 TIMES DAILY
COMMUNITY
End: 2019-09-09 | Stop reason: SDUPTHER

## 2019-09-12 LAB — AMMONIA: 58 UMOL/L (ref 16–60)

## 2019-09-19 ENCOUNTER — OFFICE VISIT (OUTPATIENT)
Dept: PRIMARY CARE CLINIC | Age: 69
End: 2019-09-19
Payer: COMMERCIAL

## 2019-09-19 VITALS
HEART RATE: 74 BPM | SYSTOLIC BLOOD PRESSURE: 138 MMHG | BODY MASS INDEX: 36.11 KG/M2 | OXYGEN SATURATION: 96 % | HEIGHT: 69 IN | RESPIRATION RATE: 16 BRPM | WEIGHT: 243.8 LBS | DIASTOLIC BLOOD PRESSURE: 84 MMHG

## 2019-09-19 DIAGNOSIS — M79.605 LEG PAIN, LATERAL, LEFT: ICD-10-CM

## 2019-09-19 DIAGNOSIS — F41.9 ANXIETY: ICD-10-CM

## 2019-09-19 DIAGNOSIS — J34.89 SINUS PRESSURE: Primary | ICD-10-CM

## 2019-09-19 DIAGNOSIS — R42 DIZZINESS: ICD-10-CM

## 2019-09-19 DIAGNOSIS — R51.9 CHRONIC DAILY HEADACHE: ICD-10-CM

## 2019-09-19 PROCEDURE — 99213 OFFICE O/P EST LOW 20 MIN: CPT | Performed by: PHYSICIAN ASSISTANT

## 2019-09-19 RX ORDER — CEFDINIR 300 MG/1
CAPSULE ORAL
Qty: 20 CAPSULE | Refills: 0 | Status: SHIPPED | OUTPATIENT
Start: 2019-09-19 | End: 2019-10-16 | Stop reason: ALTCHOICE

## 2019-09-19 RX ORDER — LORAZEPAM 0.5 MG/1
TABLET ORAL
Qty: 30 TABLET | Refills: 0 | Status: SHIPPED | OUTPATIENT
Start: 2019-09-19 | End: 2019-10-19

## 2019-09-19 ASSESSMENT — ENCOUNTER SYMPTOMS
SINUS PRESSURE: 1
SINUS PAIN: 1
EYES NEGATIVE: 1
RESPIRATORY NEGATIVE: 1

## 2019-09-19 NOTE — PROGRESS NOTES
13.8    Smokeless tobacco: Former User     Types: Chew   Substance Use Topics    Alcohol use: Yes     Alcohol/week: 35.0 - 42.0 standard drinks     Types: 35 - 42 Cans of beer per week     Comment: 6 pack daily      Current Outpatient Medications   Medication Sig Dispense Refill    LORazepam (ATIVAN) 0.5 MG tablet TAKE 1 TABLET BY MOUTH EVERY SIX HOURS AS NEEDED FOR ANXIETY 30 tablet 0    cefdinir (OMNICEF) 300 MG capsule TAKE 2 TABLETS ONCE DAILY 20 capsule 0    Magic Mouthwash (MIRACLE MOUTHWASH) Swish and spit 5 mLs 4 times daily as needed for Irritation 1 Bottle 0    topiramate (TOPAMAX) 50 MG tablet Take 1 tablet by mouth 2 times daily 60 tablet 3    Galcanezumab-gnlm (EMGALITY) 120 MG/ML SOAJ Inject into the skin      atorvastatin (LIPITOR) 10 MG tablet TAKE 1 TABLET BY MOUTH ONE TIME A DAY  30 tablet 11    allopurinol (ZYLOPRIM) 300 MG tablet TAKE 1 TABLET BY MOUTH ONE TIME A DAY 90 tablet 3    lisinopril (PRINIVIL;ZESTRIL) 10 MG tablet TAKE 1 TABLET BY MOUTH ONE TIME A DAY 30 tablet 5    docusate sodium (COLACE) 100 MG capsule Take 100 mg by mouth as needed       clopidogrel (PLAVIX) 75 MG tablet Take 1 tablet by mouth daily 90 tablet 3    omeprazole (PRILOSEC) 20 MG delayed release capsule TAKE 1 CAPSULE BY MOUTH ONE TIME A DAY 90 capsule 1    mupirocin (BACTROBAN NASAL) 2 % nasal ointment Take by Nasal route 2 times daily. 1 Tube 3    Multiple Vitamins-Minerals (OCUVITE PRESERVISION PO) Take 1 tablet by mouth daily      Acetaminophen (TYLENOL EXTRA STRENGTH PO) Take by mouth Indications: prn      POTASSIUM GLUCONATE PO Take by mouth daily      LYRICA 50 MG capsule TAKE 1 CAPSULE BY MOUTH THREE TIMES A DAY  (Patient taking differently: Take 50 mg by mouth 2 times daily. ) 90 capsule 2     No current facility-administered medications for this visit.       Allergies   Allergen Reactions    Cupric Oxide     Chrome Alum     Cobalt     Copper-Containing Compounds     Nickel        Health No submental, no submandibular, no tonsillar, no preauricular, no posterior auricular and no occipital adenopathy present. Head (left side): No submental, no submandibular, no tonsillar, no preauricular, no posterior auricular and no occipital adenopathy present. He has no cervical adenopathy. Neurological: He is alert and oriented to person, place, and time. Psychiatric: He has a normal mood and affect. Vitals reviewed. Assessment:       Diagnosis Orders   1. Sinus pressure  cefdinir (OMNICEF) 300 MG capsule   2. Leg pain, lateral, left  LORazepam (ATIVAN) 0.5 MG tablet   3. Anxiety     4. Chronic daily headache     5. Dizziness          Plan:    Stop trazodone if the Coreg does not make a difference with dizziness  Magic Mouthwash for mouth sores  Keep working on reducing ETOH  Complete ABX    Go back on the nasal saline qid  Seeing ENT Monday  Might be Lyrica causing dizziness but he does not want to stop that. Return in about 3 months (around 12/19/2019) for recheck--1/2 hour. No orders of the defined types were placed in this encounter. Orders Placed This Encounter   Medications    LORazepam (ATIVAN) 0.5 MG tablet     Sig: TAKE 1 TABLET BY MOUTH EVERY SIX HOURS AS NEEDED FOR ANXIETY     Dispense:  30 tablet     Refill:  0    cefdinir (OMNICEF) 300 MG capsule     Sig: TAKE 2 TABLETS ONCE DAILY     Dispense:  20 capsule     Refill:  0    Magic Mouthwash (MIRACLE MOUTHWASH)     Sig: Swish and spit 5 mLs 4 times daily as needed for Irritation     Dispense:  1 Bottle     Refill:  0       Patient given educationalmaterials - see patient instructions. Discussed use, benefit, and side effectsof prescribed medications. All patient questions answered. Pt voiced understanding. Reviewed health maintenance. Instructed to continue current medications, diet andexercise. Patient agreed with treatment plan. Follow up as directed.      Electronicallysigned by Conor Mcintyre PA-C on

## 2019-09-20 ENCOUNTER — TELEPHONE (OUTPATIENT)
Dept: PRIMARY CARE CLINIC | Age: 69
End: 2019-09-20

## 2019-10-01 ENCOUNTER — OFFICE VISIT (OUTPATIENT)
Dept: PODIATRY | Age: 69
End: 2019-10-01
Payer: COMMERCIAL

## 2019-10-01 VITALS — WEIGHT: 240 LBS | HEIGHT: 69 IN | BODY MASS INDEX: 35.55 KG/M2

## 2019-10-01 DIAGNOSIS — M79.605 BILATERAL LOWER EXTREMITY PAIN: ICD-10-CM

## 2019-10-01 DIAGNOSIS — M79.604 BILATERAL LOWER EXTREMITY PAIN: ICD-10-CM

## 2019-10-01 DIAGNOSIS — I73.9 PVD (PERIPHERAL VASCULAR DISEASE) (HCC): ICD-10-CM

## 2019-10-01 DIAGNOSIS — B35.1 DERMATOPHYTOSIS OF NAIL: Primary | ICD-10-CM

## 2019-10-01 DIAGNOSIS — L60.0 INGROWN NAIL: ICD-10-CM

## 2019-10-01 PROCEDURE — 11721 DEBRIDE NAIL 6 OR MORE: CPT | Performed by: PODIATRIST

## 2019-10-01 PROCEDURE — 99999 PR OFFICE/OUTPT VISIT,PROCEDURE ONLY: CPT | Performed by: PODIATRIST

## 2019-10-01 RX ORDER — CARVEDILOL 6.25 MG/1
TABLET ORAL
Refills: 11 | COMMUNITY
Start: 2019-09-21 | End: 2019-10-16

## 2019-10-01 RX ORDER — TRAZODONE HYDROCHLORIDE 50 MG/1
TABLET ORAL
Refills: 5 | COMMUNITY
Start: 2019-09-21 | End: 2019-11-22 | Stop reason: SDUPTHER

## 2019-10-02 ENCOUNTER — NURSE ONLY (OUTPATIENT)
Dept: PRIMARY CARE CLINIC | Age: 69
End: 2019-10-02
Payer: COMMERCIAL

## 2019-10-02 DIAGNOSIS — Z23 NEED FOR VACCINATION: Primary | ICD-10-CM

## 2019-10-02 PROCEDURE — G0008 ADMIN INFLUENZA VIRUS VAC: HCPCS | Performed by: PHYSICIAN ASSISTANT

## 2019-10-02 PROCEDURE — 90653 IIV ADJUVANT VACCINE IM: CPT | Performed by: PHYSICIAN ASSISTANT

## 2019-10-16 ENCOUNTER — OFFICE VISIT (OUTPATIENT)
Dept: PRIMARY CARE CLINIC | Age: 69
End: 2019-10-16
Payer: COMMERCIAL

## 2019-10-16 VITALS
DIASTOLIC BLOOD PRESSURE: 68 MMHG | OXYGEN SATURATION: 97 % | HEIGHT: 68 IN | SYSTOLIC BLOOD PRESSURE: 116 MMHG | WEIGHT: 240 LBS | BODY MASS INDEX: 36.37 KG/M2 | HEART RATE: 85 BPM

## 2019-10-16 DIAGNOSIS — Z00.00 ROUTINE GENERAL MEDICAL EXAMINATION AT A HEALTH CARE FACILITY: Primary | ICD-10-CM

## 2019-10-16 DIAGNOSIS — Z23 NEED FOR VACCINATION: ICD-10-CM

## 2019-10-16 DIAGNOSIS — I10 ESSENTIAL HYPERTENSION: ICD-10-CM

## 2019-10-16 DIAGNOSIS — I73.9 PAD (PERIPHERAL ARTERY DISEASE) (HCC): ICD-10-CM

## 2019-10-16 DIAGNOSIS — E78.2 MIXED HYPERLIPIDEMIA: ICD-10-CM

## 2019-10-16 DIAGNOSIS — Z87.891 PERSONAL HISTORY OF TOBACCO USE: ICD-10-CM

## 2019-10-16 DIAGNOSIS — Z87.891 FORMER CIGARETTE SMOKER: ICD-10-CM

## 2019-10-16 DIAGNOSIS — Z13.1 SCREENING FOR DIABETES MELLITUS: ICD-10-CM

## 2019-10-16 DIAGNOSIS — Z13.220 SCREENING FOR LIPOID DISORDERS: ICD-10-CM

## 2019-10-16 PROBLEM — R06.02 SOB (SHORTNESS OF BREATH): Status: RESOLVED | Noted: 2018-12-06 | Resolved: 2019-10-16

## 2019-10-16 PROBLEM — R63.4 WEIGHT LOSS: Status: RESOLVED | Noted: 2018-07-23 | Resolved: 2019-10-16

## 2019-10-16 PROBLEM — G47.00 INSOMNIA: Status: RESOLVED | Noted: 2017-09-07 | Resolved: 2019-10-16

## 2019-10-16 PROBLEM — R10.9 ABDOMINAL PAIN: Status: RESOLVED | Noted: 2018-07-23 | Resolved: 2019-10-16

## 2019-10-16 PROCEDURE — G0296 VISIT TO DETERM LDCT ELIG: HCPCS | Performed by: PHYSICIAN ASSISTANT

## 2019-10-16 PROCEDURE — G0438 PPPS, INITIAL VISIT: HCPCS | Performed by: PHYSICIAN ASSISTANT

## 2019-10-16 RX ORDER — CARVEDILOL 6.25 MG/1
6.25 TABLET ORAL DAILY
Qty: 30 TABLET | Refills: 2
Start: 2019-10-16 | End: 2021-10-21 | Stop reason: SDUPTHER

## 2019-10-16 ASSESSMENT — LIFESTYLE VARIABLES
HAS A RELATIVE, FRIEND, DOCTOR, OR ANOTHER HEALTH PROFESSIONAL EXPRESSED CONCERN ABOUT YOUR DRINKING OR SUGGESTED YOU CUT DOWN: 4
AUDIT-C TOTAL SCORE: 5
HOW OFTEN DO YOU HAVE A DRINK CONTAINING ALCOHOL: 4
HAVE YOU OR SOMEONE ELSE BEEN INJURED AS A RESULT OF YOUR DRINKING: 0
HOW MANY STANDARD DRINKS CONTAINING ALCOHOL DO YOU HAVE ON A TYPICAL DAY: 1
AUDIT TOTAL SCORE: 9
HOW OFTEN DURING THE LAST YEAR HAVE YOU FOUND THAT YOU WERE NOT ABLE TO STOP DRINKING ONCE YOU HAD STARTED: 0
HOW OFTEN DURING THE LAST YEAR HAVE YOU NEEDED AN ALCOHOLIC DRINK FIRST THING IN THE MORNING TO GET YOURSELF GOING AFTER A NIGHT OF HEAVY DRINKING: 0
HOW OFTEN DURING THE LAST YEAR HAVE YOU HAD A FEELING OF GUILT OR REMORSE AFTER DRINKING: 0
HOW OFTEN DURING THE LAST YEAR HAVE YOU FAILED TO DO WHAT WAS NORMALLY EXPECTED FROM YOU BECAUSE OF DRINKING: 0
HOW OFTEN DURING THE LAST YEAR HAVE YOU BEEN UNABLE TO REMEMBER WHAT HAPPENED THE NIGHT BEFORE BECAUSE YOU HAD BEEN DRINKING: 0
HOW OFTEN DO YOU HAVE SIX OR MORE DRINKS ON ONE OCCASION: 0

## 2019-10-16 ASSESSMENT — PATIENT HEALTH QUESTIONNAIRE - PHQ9
SUM OF ALL RESPONSES TO PHQ QUESTIONS 1-9: 1
SUM OF ALL RESPONSES TO PHQ QUESTIONS 1-9: 1

## 2019-10-24 LAB
ANION GAP SERPL CALCULATED.3IONS-SCNC: 10 MMOL/L (ref 4–12)
BUN BLDV-MCNC: 12 MG/DL (ref 5–27)
CALCIUM SERPL-MCNC: 9 MG/DL (ref 8.5–10.5)
CHLORIDE BLD-SCNC: 106 MMOL/L (ref 98–109)
CHOLESTEROL/HDL RATIO: 3.6 (ref 1–5)
CHOLESTEROL: 185 MG/DL (ref 150–200)
CO2: 27 MMOL/L (ref 22–32)
CREAT SERPL-MCNC: 0.66 MG/DL (ref 0.6–1.3)
EGFR AFRICAN AMERICAN: >60 ML/MIN/1.73SQ.M
EGFR IF NONAFRICAN AMERICAN: >60 ML/MIN/1.73SQ.M
GLUCOSE: 107 MG/DL (ref 65–99)
HDLC SERPL-MCNC: 51 MG/DL
LDL CHOLESTEROL CALCULATED: 83 MG/DL
LDL/HDL RATIO: 1.6
POTASSIUM SERPL-SCNC: 4.2 MMOL/L (ref 3.5–5)
SODIUM BLD-SCNC: 143 MMOL/L (ref 134–146)
TRIGL SERPL-MCNC: 253 MG/DL (ref 27–150)
VLDLC SERPL CALC-MCNC: 51 MG/DL (ref 0–30)

## 2019-11-19 ENCOUNTER — OFFICE VISIT (OUTPATIENT)
Dept: PODIATRY | Age: 69
End: 2019-11-19
Payer: COMMERCIAL

## 2019-11-19 VITALS — BODY MASS INDEX: 36.37 KG/M2 | HEIGHT: 68 IN | WEIGHT: 240 LBS

## 2019-11-19 DIAGNOSIS — M10.00 IDIOPATHIC GOUT, UNSPECIFIED CHRONICITY, UNSPECIFIED SITE: Primary | ICD-10-CM

## 2019-11-19 DIAGNOSIS — M79.605 BILATERAL LOWER EXTREMITY PAIN: ICD-10-CM

## 2019-11-19 DIAGNOSIS — R60.0 EDEMA OF LOWER EXTREMITY: ICD-10-CM

## 2019-11-19 DIAGNOSIS — I73.9 PVD (PERIPHERAL VASCULAR DISEASE) (HCC): ICD-10-CM

## 2019-11-19 DIAGNOSIS — M79.604 BILATERAL LOWER EXTREMITY PAIN: ICD-10-CM

## 2019-11-19 PROCEDURE — 99213 OFFICE O/P EST LOW 20 MIN: CPT | Performed by: PODIATRIST

## 2019-11-19 RX ORDER — PREDNISONE 10 MG/1
10 TABLET ORAL DAILY
Qty: 10 TABLET | Refills: 0 | Status: SHIPPED | OUTPATIENT
Start: 2019-11-19 | End: 2019-11-29

## 2019-11-22 DIAGNOSIS — K20.80 CORROSIVE ESOPHAGITIS: ICD-10-CM

## 2019-11-22 RX ORDER — OMEPRAZOLE 20 MG/1
CAPSULE, DELAYED RELEASE ORAL
Qty: 30 CAPSULE | Refills: 3 | Status: SHIPPED | OUTPATIENT
Start: 2019-11-22 | End: 2020-03-26 | Stop reason: SDUPTHER

## 2019-11-22 RX ORDER — TRAZODONE HYDROCHLORIDE 50 MG/1
50 TABLET ORAL NIGHTLY
Qty: 30 TABLET | Refills: 5 | Status: SHIPPED | OUTPATIENT
Start: 2019-11-22 | End: 2019-12-05 | Stop reason: SDUPTHER

## 2019-11-27 DIAGNOSIS — Z87.891 PERSONAL HISTORY OF TOBACCO USE: ICD-10-CM

## 2019-11-27 DIAGNOSIS — Z87.891 FORMER CIGARETTE SMOKER: ICD-10-CM

## 2019-12-03 ENCOUNTER — OFFICE VISIT (OUTPATIENT)
Dept: PODIATRY | Age: 69
End: 2019-12-03
Payer: COMMERCIAL

## 2019-12-03 VITALS — HEIGHT: 68 IN | BODY MASS INDEX: 36.37 KG/M2 | WEIGHT: 240 LBS

## 2019-12-03 DIAGNOSIS — M19.071 ARTHRITIS OF MIDTARSAL JOINT OF RIGHT FOOT: ICD-10-CM

## 2019-12-03 DIAGNOSIS — M79.605 BILATERAL LOWER EXTREMITY PAIN: Primary | ICD-10-CM

## 2019-12-03 DIAGNOSIS — M79.604 BILATERAL LOWER EXTREMITY PAIN: Primary | ICD-10-CM

## 2019-12-03 DIAGNOSIS — R60.0 EDEMA OF LOWER EXTREMITY: ICD-10-CM

## 2019-12-03 PROCEDURE — 99213 OFFICE O/P EST LOW 20 MIN: CPT | Performed by: PODIATRIST

## 2019-12-03 RX ORDER — PREDNISONE 10 MG/1
10 TABLET ORAL DAILY
Qty: 10 TABLET | Refills: 2 | Status: SHIPPED | OUTPATIENT
Start: 2019-12-03 | End: 2020-01-02

## 2019-12-05 ENCOUNTER — TELEPHONE (OUTPATIENT)
Dept: PRIMARY CARE CLINIC | Age: 69
End: 2019-12-05

## 2019-12-05 DIAGNOSIS — M51.36 DISC DEGENERATION, LUMBAR: ICD-10-CM

## 2019-12-05 DIAGNOSIS — R20.2 PARESTHESIA OF LEFT LEG: ICD-10-CM

## 2019-12-05 DIAGNOSIS — M79.605 LEG PAIN, LATERAL, LEFT: ICD-10-CM

## 2019-12-05 RX ORDER — PREGABALIN 50 MG/1
50 CAPSULE ORAL 2 TIMES DAILY
Qty: 60 CAPSULE | Refills: 2 | Status: SHIPPED | OUTPATIENT
Start: 2019-12-05 | End: 2020-04-16

## 2019-12-05 RX ORDER — TRAZODONE HYDROCHLORIDE 50 MG/1
50 TABLET ORAL NIGHTLY
Qty: 30 TABLET | Refills: 5 | Status: SHIPPED | OUTPATIENT
Start: 2019-12-05 | End: 2020-04-03 | Stop reason: SDUPTHER

## 2019-12-05 NOTE — TELEPHONE ENCOUNTER
Pt's spouse Caridad Cee calling. Asking if you would send in more of the [address] for pt? A few days after he finished the rx, he is getting some more ulcers on left side of tongue. Uses [facility] Sosa listed.

## 2019-12-10 RX ORDER — PILOCARPINE HYDROCHLORIDE 5 MG/1
TABLET, FILM COATED ORAL
Qty: 90 TABLET | Refills: 4 | Status: SHIPPED | OUTPATIENT
Start: 2019-12-10 | End: 2020-06-29 | Stop reason: SDUPTHER

## 2020-01-03 RX ORDER — CLOPIDOGREL BISULFATE 75 MG/1
75 TABLET ORAL DAILY
Qty: 90 TABLET | Refills: 3 | Status: SHIPPED | OUTPATIENT
Start: 2020-01-03 | End: 2020-12-31 | Stop reason: SDUPTHER

## 2020-01-03 RX ORDER — TOPIRAMATE 50 MG/1
50 TABLET, FILM COATED ORAL 2 TIMES DAILY
Qty: 60 TABLET | Refills: 5 | Status: SHIPPED | OUTPATIENT
Start: 2020-01-03 | End: 2021-02-03 | Stop reason: SDUPTHER

## 2020-01-28 ENCOUNTER — OFFICE VISIT (OUTPATIENT)
Dept: GASTROENTEROLOGY | Age: 70
End: 2020-01-28
Payer: COMMERCIAL

## 2020-01-28 VITALS
WEIGHT: 249 LBS | HEART RATE: 84 BPM | SYSTOLIC BLOOD PRESSURE: 127 MMHG | BODY MASS INDEX: 38.42 KG/M2 | DIASTOLIC BLOOD PRESSURE: 81 MMHG

## 2020-01-28 PROCEDURE — 99213 OFFICE O/P EST LOW 20 MIN: CPT | Performed by: INTERNAL MEDICINE

## 2020-01-28 RX ORDER — LORAZEPAM 0.5 MG/1
0.5 TABLET ORAL EVERY 6 HOURS PRN
COMMUNITY
End: 2020-05-15 | Stop reason: SDUPTHER

## 2020-01-28 ASSESSMENT — ENCOUNTER SYMPTOMS
SHORTNESS OF BREATH: 0
EYE PAIN: 0
RECTAL PAIN: 0
EYE REDNESS: 0
WHEEZING: 0
NAUSEA: 0
CHEST TIGHTNESS: 0
SINUS PRESSURE: 0
RESPIRATORY NEGATIVE: 1
BLOOD IN STOOL: 0
ABDOMINAL PAIN: 1
ALLERGIC/IMMUNOLOGIC NEGATIVE: 1
CONSTIPATION: 0
BACK PAIN: 1
ABDOMINAL DISTENTION: 1
TROUBLE SWALLOWING: 0
ANAL BLEEDING: 0
DIARRHEA: 0
VOMITING: 0
SINUS PAIN: 0

## 2020-01-28 NOTE — PROGRESS NOTES
Subjective:      Patient ID: Neelima Herron is a 68 y.o. male. HPI  Dr. Janelle Hutchison PA-C our mutual patient Neelima Herron was seen  for No diagnosis found. .    Past Medical, Family, and Social History reviewed and does contribute to the patient presenting condition. Patient seen along with his wife. He has vague nonspecific abdominal discomfort, bloating, change of bowel habits. Basing on the description of the symptoms, it appears that he has IBS. Also to be noted that he has this issues for several years without any significant change recently. He had EGD and colonoscopy done in April 2019 which are nonspecific. Colonoscopy revealed diverticulosis. Had a small polyp in between the diverticuli, excision of this polyp with biopsy forceps did not reveal significant histological abnormality. EGD showed questionable inflammation in the duodenum, however extensive biopsies of this area did not reveal histological abnormality. At present GERD symptoms appears to be resolved. He is swallowing better. Patient tolerating diet well. No weight loss. No dysphagia. He does have abdominal bloating usually in the postprandial state. Has a loose bowels. No hematochezia. No significant constipation. No nocturnal bowel movements. Patient has a chronic back pain. Uses walker for ambulation. patient\"s PMH/PSH,SH,PSYCH hx, MEDs, ALLERGIES, and ROS was all reviewed and updated ion the appropriate sectionsDr. Janelle Hutchison PA-C our mutual patient Neelima Herron was seen  for No diagnosis found. .    Past Medical, Family, and Social History reviewed and does contribute to the patient presenting condition. patient\"s PMH/PSH,SH,PSYCH hx, MEDs, ALLERGIES, and ROS was all reviewed and updated ion the appropriate sections  Review of Systems   Constitutional: Positive for appetite change (decreased) and unexpected weight change (decreased). Negative for fatigue.    HENT: Negative for sinus pressure, sinus pain and trouble swallowing. Eyes: Positive for visual disturbance (glasses). Negative for pain and redness. Respiratory: Negative. Negative for chest tightness, shortness of breath and wheezing. Cardiovascular: Negative. Negative for chest pain, palpitations and leg swelling. Gastrointestinal: Positive for abdominal distention and abdominal pain. Negative for anal bleeding, blood in stool, constipation, diarrhea, nausea, rectal pain and vomiting. Endocrine: Negative. Negative for cold intolerance and heat intolerance. Genitourinary: Positive for frequency and urgency. Negative for difficulty urinating. Musculoskeletal: Positive for arthralgias, back pain and neck pain. Skin: Negative. Allergic/Immunologic: Negative. Negative for environmental allergies and food allergies. Neurological: Positive for headaches. Negative for dizziness and weakness. Hematological: Bruises/bleeds easily. Psychiatric/Behavioral: Negative for agitation and sleep disturbance. The patient is nervous/anxious. Objective:   Physical Exam  Vitals signs and nursing note reviewed. Constitutional:       General: He is not in acute distress. Appearance: He is well-developed. HENT:      Head: Normocephalic and atraumatic. Mouth/Throat:      Pharynx: No oropharyngeal exudate. Eyes:      General: No scleral icterus. Conjunctiva/sclera: Conjunctivae normal.      Pupils: Pupils are equal, round, and reactive to light. Neck:      Musculoskeletal: Normal range of motion and neck supple. Thyroid: No thyromegaly. Trachea: No tracheal deviation. Cardiovascular:      Rate and Rhythm: Normal rate and regular rhythm. Heart sounds: Normal heart sounds. No murmur. Pulmonary:      Effort: Pulmonary effort is normal. No respiratory distress. Breath sounds: Normal breath sounds. No wheezing or rales.    Abdominal:      General: Bowel sounds are normal. There is no distension. Palpations: Abdomen is soft. There is no hepatomegaly or mass. Tenderness: There is no abdominal tenderness. There is no guarding. Hernia: No hernia is present. Comments: No peripheral signs of ch. Liver disease   Genitourinary:     Rectum: Normal.   Lymphadenopathy:      Cervical: No cervical adenopathy. Skin:     General: Skin is warm and dry. Findings: No erythema or rash. Neurological:      Mental Status: He is alert and oriented to person, place, and time. Cranial Nerves: No cranial nerve deficit. Psychiatric:         Thought Content: Thought content normal.         Assessment:       Diagnosis Orders   1. Abdominal pain, right upper quadrant     2. Gastroesophageal reflux disease, esophagitis presence not specified     3. Irritable bowel syndrome with diarrhea             Plan:      Basing on the history, examination, work-up done so far, I think that he has IBS. Chronic back pain may be contributing to some musculoskeletal pain towards the right flank. Patient has these issues for several  years without change recently. I did discuss with the patient and his wife regarding the work-up done and including EGD colonoscopy results. Discussed regarding possible IBS and management. Patient is reassured. Brochures given regarding IBS management. Advised to lose some weight. Advised calorie restriction. To follow antireflux measures. Advised physical therapy for back pain. We will see him on as-needed basis. To contact me if he has any change in his symptoms or has any concerns.

## 2020-02-04 ENCOUNTER — OFFICE VISIT (OUTPATIENT)
Dept: PODIATRY | Age: 70
End: 2020-02-04
Payer: COMMERCIAL

## 2020-02-04 VITALS — HEIGHT: 68 IN | BODY MASS INDEX: 37.74 KG/M2 | WEIGHT: 249 LBS

## 2020-02-04 PROCEDURE — 99999 PR OFFICE/OUTPT VISIT,PROCEDURE ONLY: CPT | Performed by: PODIATRIST

## 2020-02-04 PROCEDURE — 11721 DEBRIDE NAIL 6 OR MORE: CPT | Performed by: PODIATRIST

## 2020-02-04 NOTE — PROGRESS NOTES
Left       Nails are painful 1-10 with direct palpation. Q7   []Yes  []No                Q8   [x]Yes  []No                     Q9   []Yes    []No  Assessment:  68 y.o. male with:    Diagnosis Orders   1. Dermatophytosis of nail  88341 - [place] DEBRIDEMENT OF NAILS, 6 OR MORE   2. Bilateral lower extremity pain  36097 - [place] DEBRIDEMENT OF NAILS, 6 OR MORE   3. PVD (peripheral vascular disease) ([address]  77089 - [place] DEBRIDEMENT OF NAILS, 6 OR MORE   4. Ingrown nail  99534 - [place] DEBRIDEMENT OF NAILS, 6 OR MORE         Plan:   Pt was evaluated and examined. Patient was given personalized discharge instructions. Nails 1-10 were debrided in length and thickness sharply with a nail nipper and  without incident. Pt will follow up in 9 weeks or sooner if any problems arise. Diagnosis was discussed with the pt and all of their questions were answered in detail. Proper foot hygiene and care was discussed with the pt. Patient to check feet daily and contact the office with any questions/problems/concerns. Other comorbidity noted and will be managed by PCP. Pain waiver discussed with patient and confirmed.    2/4/2020      Electronically signed by Scott Redd DPM on 2/4/2020 at 9:03 AM  2/4/2020

## 2020-03-12 ENCOUNTER — OFFICE VISIT (OUTPATIENT)
Dept: GASTROENTEROLOGY | Age: 70
End: 2020-03-12
Payer: COMMERCIAL

## 2020-03-12 VITALS
DIASTOLIC BLOOD PRESSURE: 75 MMHG | HEART RATE: 67 BPM | BODY MASS INDEX: 38.42 KG/M2 | SYSTOLIC BLOOD PRESSURE: 139 MMHG | WEIGHT: 249 LBS | OXYGEN SATURATION: 98 %

## 2020-03-12 PROCEDURE — 99214 OFFICE O/P EST MOD 30 MIN: CPT | Performed by: INTERNAL MEDICINE

## 2020-03-12 ASSESSMENT — ENCOUNTER SYMPTOMS
BLOOD IN STOOL: 0
SINUS PAIN: 0
NAUSEA: 1
WHEEZING: 0
VOMITING: 0
DIARRHEA: 1
ALLERGIC/IMMUNOLOGIC NEGATIVE: 1
CHEST TIGHTNESS: 0
EYE REDNESS: 0
RESPIRATORY NEGATIVE: 1
TROUBLE SWALLOWING: 0
SINUS PRESSURE: 0
EYE PAIN: 0
SHORTNESS OF BREATH: 0
ANAL BLEEDING: 0
ABDOMINAL PAIN: 1
ABDOMINAL DISTENTION: 1
BACK PAIN: 1
RECTAL PAIN: 0
CONSTIPATION: 0

## 2020-03-12 NOTE — PROGRESS NOTES
Subjective:      Patient ID: Hilda Gibbs is a 68 y.o. male. HPI  Chief Complaint   Patient presents with    Abdominal Pain     bloating , diarrhea, not able to eat very much upper right abdominal pain    Patient seen along with APRN and history discussed and examined. Patient being seen for follow-up abdominal pain, bloating, diarrhea. Patient reports having loose, watery stools twice daily. Has feeling of fullness and bloating. Patient reports early satiety. Has chronic GERD managed with Omeprazole. No weight gain, weight loss. No fevers, chills. Has excessive gas. Has RUQ pain, burning pain. Pain is constant. No alleviating symptoms. Sharp when lifting, coughing, etc.    Drinks 4-6 beers a day. Former smoker      Review of Systems   Constitutional: Positive for appetite change (decreased) and fatigue. Negative for unexpected weight change (decreased). HENT: Negative for dental problem, sinus pressure, sinus pain and trouble swallowing. Eyes: Positive for visual disturbance (glasses). Negative for pain and redness. Respiratory: Negative. Negative for chest tightness, shortness of breath and wheezing. Cardiovascular: Negative. Negative for chest pain, palpitations and leg swelling. Gastrointestinal: Positive for abdominal distention, abdominal pain, diarrhea and nausea. Negative for anal bleeding, blood in stool, constipation, rectal pain and vomiting. Endocrine: Negative. Negative for cold intolerance and heat intolerance. Genitourinary: Positive for frequency and urgency. Negative for difficulty urinating. Musculoskeletal: Positive for arthralgias, back pain and neck pain. Skin: Negative. Allergic/Immunologic: Negative. Negative for environmental allergies and food allergies. Neurological: Positive for dizziness, light-headedness and headaches. Negative for weakness. Hematological: Bruises/bleeds easily.    Psychiatric/Behavioral: Positive for sleep disturbance. Negative for agitation. The patient is nervous/anxious. JENNY TraceyC our mutual patient Pily Harris was seen  for No diagnosis found. .    Past Medical, Family, and Social History reviewed and does contribute to the patient presenting condition. patient\"s PMH/PSH,SH,PSYCH hx, MEDs, ALLERGIES, and ROS was all reviewed and updated ion the appropriate sections    Objective:   Physical Exam  Vitals signs and nursing note reviewed. Constitutional:       General: He is not in acute distress. Appearance: He is well-developed. Comments: Moderately overweight patient. HENT:      Head: Normocephalic and atraumatic. Mouth/Throat:      Pharynx: No oropharyngeal exudate. Eyes:      General: No scleral icterus. Conjunctiva/sclera: Conjunctivae normal.      Pupils: Pupils are equal, round, and reactive to light. Neck:      Musculoskeletal: Normal range of motion and neck supple. Thyroid: No thyromegaly. Trachea: No tracheal deviation. Cardiovascular:      Rate and Rhythm: Normal rate and regular rhythm. Heart sounds: Normal heart sounds. No murmur. Pulmonary:      Effort: Pulmonary effort is normal. No respiratory distress. Breath sounds: Normal breath sounds. No wheezing or rales. Abdominal:      General: Bowel sounds are normal. There is no distension. Palpations: Abdomen is soft. There is no hepatomegaly or mass. Tenderness: There is no abdominal tenderness. There is no guarding. Hernia: No hernia is present. Comments: No peripheral signs of ch. Liver disease, obese abdomen. Genitourinary:     Rectum: Normal.   Lymphadenopathy:      Cervical: No cervical adenopathy. Skin:     General: Skin is warm and dry. Findings: No erythema or rash. Neurological:      Mental Status: He is alert and oriented to person, place, and time. Cranial Nerves: No cranial nerve deficit.    Psychiatric:         Thought Content:

## 2020-03-16 ENCOUNTER — TELEPHONE (OUTPATIENT)
Dept: GASTROENTEROLOGY | Age: 70
End: 2020-03-16

## 2020-03-16 NOTE — TELEPHONE ENCOUNTER
Gilmer Samaniego at [facility] called [facility] [phone] and asked for clinical records to be faxed to [phone] for Ct abd approval.  Records faxed. Melissa Yang at scheduling [phone] asked for creatine order to be faxed to [phone] for Ct Abd. Order placed and faxed.

## 2020-03-23 RX ORDER — CITALOPRAM 10 MG/1
TABLET ORAL
Qty: 30 TABLET | Refills: 5 | Status: SHIPPED | OUTPATIENT
Start: 2020-03-23 | End: 2020-09-28

## 2020-03-27 RX ORDER — OMEPRAZOLE 20 MG/1
CAPSULE, DELAYED RELEASE ORAL
Qty: 90 CAPSULE | Refills: 3 | Status: SHIPPED | OUTPATIENT
Start: 2020-03-27 | End: 2021-03-19

## 2020-03-30 ENCOUNTER — TELEPHONE (OUTPATIENT)
Dept: GASTROENTEROLOGY | Age: 70
End: 2020-03-30

## 2020-03-30 NOTE — TELEPHONE ENCOUNTER
Pt's wife left vm that she would like you to review pt's fecal labs with her done 3/23/20 at [facility]. Can you call, please? Thanks!

## 2020-03-30 NOTE — TELEPHONE ENCOUNTER
Labs reviewed, WNL. Please advise patient. Appears patient did not have CT abd/pelvis. Advise patient to schedule [facility] follow up if needed.

## 2020-04-03 RX ORDER — TRAZODONE HYDROCHLORIDE 50 MG/1
50 TABLET ORAL NIGHTLY
Qty: 30 TABLET | Refills: 2 | Status: SHIPPED | OUTPATIENT
Start: 2020-04-03 | End: 2020-11-24 | Stop reason: SDUPTHER

## 2020-04-09 RX ORDER — ALLOPURINOL 300 MG/1
TABLET ORAL
Qty: 90 TABLET | Refills: 3 | Status: SHIPPED | OUTPATIENT
Start: 2020-04-09 | End: 2021-03-22

## 2020-04-16 ENCOUNTER — TELEPHONE (OUTPATIENT)
Dept: PRIMARY CARE CLINIC | Age: 70
End: 2020-04-16

## 2020-04-16 RX ORDER — PREGABALIN 50 MG/1
50 CAPSULE ORAL 3 TIMES DAILY
Qty: 90 CAPSULE | Refills: 2 | Status: SHIPPED | OUTPATIENT
Start: 2020-04-16 | End: 2020-08-10

## 2020-05-05 ENCOUNTER — OFFICE VISIT (OUTPATIENT)
Dept: PODIATRY | Age: 70
End: 2020-05-05
Payer: COMMERCIAL

## 2020-05-05 VITALS — TEMPERATURE: 97.8 F | WEIGHT: 249 LBS | HEIGHT: 68 IN | BODY MASS INDEX: 37.74 KG/M2

## 2020-05-05 PROCEDURE — 11721 DEBRIDE NAIL 6 OR MORE: CPT | Performed by: PODIATRIST

## 2020-05-05 PROCEDURE — 99213 OFFICE O/P EST LOW 20 MIN: CPT | Performed by: PODIATRIST

## 2020-05-05 NOTE — PROGRESS NOTES
tablet by mouth 2 times daily 60 tablet 5    clopidogrel (PLAVIX) 75 MG tablet Take 1 tablet by mouth daily 90 tablet 3    pilocarpine (SALAGEN) 5 MG tablet TAKE 1 TABLET BY MOUTH THREE TIMES A DAY  90 tablet 4    carvedilol (COREG) 6.25 MG tablet Take 1 tablet by mouth daily 30 tablet 2    Galcanezumab-gnlm (EMGALITY) 120 MG/ML SOAJ Inject into the skin      atorvastatin (LIPITOR) 10 MG tablet TAKE 1 TABLET BY MOUTH ONE TIME A DAY  30 tablet 11    lisinopril (PRINIVIL;ZESTRIL) 10 MG tablet TAKE 1 TABLET BY MOUTH ONE TIME A DAY 30 tablet 5    Multiple Vitamins-Minerals (OCUVITE PRESERVISION PO) Take 1 tablet by mouth daily      Acetaminophen (TYLENOL EXTRA STRENGTH PO) Take by mouth Indications: prn      POTASSIUM GLUCONATE PO Take by mouth daily       No current facility-administered medications on file prior to visit. Review of Systems. Review of Systems:   History obtained from chart review and the patient  General ROS: negative for - chills, fatigue, fever, night sweats or weight gain  Constitutional: Negative for chills, diaphoresis, fatigue, fever and unexpected weight change. Musculoskeletal: Positive for arthralgias, gait problem and joint swelling. Neurological ROS: negative for - behavioral changes, confusion, headaches or seizures. Negative for weakness and numbness. Dermatological ROS: negative for - mole changes, rash  Cardiovascular: Negative for leg swelling. Gastrointestinal: Negative for constipation, diarrhea, nausea and vomiting. Objective:  Dermatologic Exam:  Skin lesion/ulceration Absent . Skin No rashes or nodules noted. .   Skin is thin, with flaky sloughing skin as well as decreased hair growth to the lower leg  Small red hemosiderin deposits seen dorsal foot   Musculoskeletal:     1st MPJ ROM decreased, Bilateral. +POP right plantar medial calcaneal tubercle  Muscle strength 5/5, Bilateral.  Pain present upon palpation of toenails 1-5, Bilateral. decreased palpation. Q7   []Yes  []No                Q8   [x]Yes  []No                     Q9   []Yes    []No  Assessment:  68 y.o. male with:    Diagnosis Orders   1. Dermatophytosis of nail  34751 - [place] DEBRIDEMENT OF NAILS, 6 OR MORE   2. Bilateral lower extremity pain  10719 - [place] DEBRIDEMENT OF NAILS, 6 OR MORE   3. PVD (peripheral vascular disease) ([facility] [address]  08383 - [place] DEBRIDEMENT OF NAILS, 6 OR MORE   4. Plantar fasciitis, right     5. Ingrown nail  06403 - [place] DEBRIDEMENT OF NAILS, 6 OR MORE         Plan:   Pt was evaluated and examined. Patient was given personalized discharge instructions. Patient Instructions: Plantar Fasciitis    Plantar Fasciitis is inflammation of the long fibrous band on the bottom of the foot (plantar fascia), especially in the area of its attachment to the heel bone (calcaneus). The plantar fascia is also intimately related to the Achilles tendon and therefore stretching of the calf muscles is also important for treatment. 1. Stretching: Perform 3-4 times daily, 2-3 minutes per side      -Before getting out of bed, place a towel around the ball of your foot and       pull back, holding for 30 seconds. Repeat with other foot.      -Place a tennis ball on the floor. Roll the tennis ball under your foot for      10-15 minutes. Repeat with other foot. -Perform calf stretches: stand facing a wall with your hands on the wall,      place one leg a step behind the other. Keeping your back heel on the      floor, bend your front knee (lean into the wall), holding for 30 seconds. Repeat with other leg. 2. Icing: Perform 2-3 times daily      -Place a 12 oz plastic water bottle in the freezer. Once frozen, remove     and roll the bottle under your foot for 10-15 min. 3. Anti-inflammatory Medication      -Begin daily regimen of anti-inflammatory medication (Ibuprofen,       Naproxen, Naprosyn) as discussed during your visit.        Nails 1-10 were debrided in length

## 2020-05-12 ENCOUNTER — TELEPHONE (OUTPATIENT)
Dept: GASTROENTEROLOGY | Age: 70
End: 2020-05-12

## 2020-05-15 RX ORDER — LORAZEPAM 0.5 MG/1
0.5 TABLET ORAL EVERY 6 HOURS PRN
Qty: 30 TABLET | Refills: 0 | Status: SHIPPED | OUTPATIENT
Start: 2020-05-15 | End: 2020-09-10 | Stop reason: SDUPTHER

## 2020-06-17 ENCOUNTER — OFFICE VISIT (OUTPATIENT)
Dept: GASTROENTEROLOGY | Age: 70
End: 2020-06-17
Payer: COMMERCIAL

## 2020-06-17 VITALS — WEIGHT: 242 LBS | RESPIRATION RATE: 18 BRPM | TEMPERATURE: 97.2 F | BODY MASS INDEX: 35.84 KG/M2 | HEIGHT: 69 IN

## 2020-06-17 PROCEDURE — 99213 OFFICE O/P EST LOW 20 MIN: CPT | Performed by: NURSE PRACTITIONER

## 2020-06-17 RX ORDER — MAGNESIUM 30 MG
30 TABLET ORAL 2 TIMES DAILY
COMMUNITY

## 2020-06-17 RX ORDER — CHOLESTYRAMINE 4 G/9G
1 POWDER, FOR SUSPENSION ORAL 2 TIMES DAILY
Qty: 90 PACKET | Refills: 3 | Status: SHIPPED | OUTPATIENT
Start: 2020-06-17 | End: 2020-11-18

## 2020-06-17 RX ORDER — CALCIUM CARBONATE 500(1250)
500 TABLET ORAL DAILY
COMMUNITY

## 2020-06-17 ASSESSMENT — ENCOUNTER SYMPTOMS
TROUBLE SWALLOWING: 0
CONSTIPATION: 0
EYE PAIN: 0
ABDOMINAL DISTENTION: 1
CHEST TIGHTNESS: 0
ABDOMINAL PAIN: 1
RESPIRATORY NEGATIVE: 1
SINUS PAIN: 0
ALLERGIC/IMMUNOLOGIC NEGATIVE: 1
SINUS PRESSURE: 0
RECTAL PAIN: 0
BLOOD IN STOOL: 0
SHORTNESS OF BREATH: 0
ANAL BLEEDING: 0
BACK PAIN: 1
WHEEZING: 0
DIARRHEA: 1
NAUSEA: 0
VOMITING: 0
EYE REDNESS: 0

## 2020-06-29 RX ORDER — PILOCARPINE HYDROCHLORIDE 5 MG/1
TABLET, FILM COATED ORAL
Qty: 90 TABLET | Refills: 3 | Status: SHIPPED | OUTPATIENT
Start: 2020-06-29 | End: 2020-12-28

## 2020-06-29 RX ORDER — ATORVASTATIN CALCIUM 10 MG/1
TABLET, FILM COATED ORAL
Qty: 90 TABLET | Refills: 3 | Status: SHIPPED | OUTPATIENT
Start: 2020-06-29 | End: 2021-06-21

## 2020-06-30 ENCOUNTER — OFFICE VISIT (OUTPATIENT)
Dept: PRIMARY CARE CLINIC | Age: 70
End: 2020-06-30
Payer: COMMERCIAL

## 2020-06-30 VITALS
SYSTOLIC BLOOD PRESSURE: 130 MMHG | BODY MASS INDEX: 35.7 KG/M2 | WEIGHT: 241 LBS | HEIGHT: 69 IN | DIASTOLIC BLOOD PRESSURE: 82 MMHG | TEMPERATURE: 97.2 F | OXYGEN SATURATION: 96 % | HEART RATE: 67 BPM

## 2020-06-30 PROBLEM — M75.42 IMPINGEMENT SYNDROME OF LEFT SHOULDER: Status: ACTIVE | Noted: 2020-06-30

## 2020-06-30 PROBLEM — G89.29 CHRONIC LEFT SHOULDER PAIN: Status: ACTIVE | Noted: 2020-06-30

## 2020-06-30 PROBLEM — I49.3 PVC (PREMATURE VENTRICULAR CONTRACTION): Status: ACTIVE | Noted: 2020-06-26

## 2020-06-30 PROBLEM — M25.512 CHRONIC LEFT SHOULDER PAIN: Status: ACTIVE | Noted: 2020-06-30

## 2020-06-30 PROBLEM — E66.01 SEVERE OBESITY (BMI 35.0-39.9) WITH COMORBIDITY (HCC): Status: ACTIVE | Noted: 2019-05-30

## 2020-06-30 PROCEDURE — 99214 OFFICE O/P EST MOD 30 MIN: CPT | Performed by: PHYSICIAN ASSISTANT

## 2020-06-30 ASSESSMENT — ENCOUNTER SYMPTOMS
NAUSEA: 0
EYE PAIN: 0
DIARRHEA: 0
TROUBLE SWALLOWING: 0
COUGH: 0
BLOOD IN STOOL: 0
BACK PAIN: 0
VOMITING: 0
CHEST TIGHTNESS: 0
VOICE CHANGE: 0
SHORTNESS OF BREATH: 1
WHEEZING: 0
ABDOMINAL PAIN: 0
CONSTIPATION: 0

## 2020-06-30 ASSESSMENT — PATIENT HEALTH QUESTIONNAIRE - PHQ9
SUM OF ALL RESPONSES TO PHQ QUESTIONS 1-9: 0
SUM OF ALL RESPONSES TO PHQ QUESTIONS 1-9: 0
2. FEELING DOWN, DEPRESSED OR HOPELESS: 0
1. LITTLE INTEREST OR PLEASURE IN DOING THINGS: 0
SUM OF ALL RESPONSES TO PHQ9 QUESTIONS 1 & 2: 0

## 2020-06-30 NOTE — PROGRESS NOTES
BMI 35.59 kg/m²   Physical Exam  Vitals signs reviewed. Constitutional:       Appearance: He is well-developed. HENT:      Right Ear: External ear normal.      Left Ear: External ear normal.      Nose: Nose normal.      Mouth/Throat:      Pharynx: No oropharyngeal exudate. Eyes:      General:         Right eye: No discharge. Left eye: No discharge. Conjunctiva/sclera: Conjunctivae normal.      Pupils: Pupils are equal, round, and reactive to light. Neck:      Musculoskeletal: Normal range of motion. Thyroid: No thyromegaly. Cardiovascular:      Rate and Rhythm: Normal rate and regular rhythm. Heart sounds: Normal heart sounds. No murmur. No friction rub. No gallop. Pulmonary:      Effort: Pulmonary effort is normal. No respiratory distress. Breath sounds: Normal breath sounds. No wheezing or rales. Chest:      Chest wall: No tenderness. Abdominal:      General: Bowel sounds are normal. There is no distension. Palpations: Abdomen is soft. There is no mass. Tenderness: There is no abdominal tenderness. There is no guarding or rebound. Hernia: No hernia is present. Musculoskeletal: Normal range of motion. Skin:     General: Skin is warm. Capillary Refill: Capillary refill takes less than 2 seconds. Findings: No rash. Neurological:      Mental Status: He is alert and oriented to person, place, and time. Motor: No abnormal muscle tone. Coordination: Coordination normal.      Deep Tendon Reflexes: Reflexes normal.         Assessment:       Diagnosis Orders   1. Chronic left shoulder pain     2. Impingement syndrome of left shoulder          Plan:    A few metal allergies to note if using a metal prosthesis  Reviewed BW  Get cardiology clearance note and then can send medical clearance. 2:45pm Send medical clearance for Mr. Stef De La Rosa. He is an imtermediate risk candidate for shoulder surgery.     Return in about 3 months (around 9/30/2020) for recheck--1/2 hour. No orders of the defined types were placed in this encounter. No orders of the defined types were placed in this encounter. Patient given educationalmaterials - see patient instructions. Discussed use, benefit, and side effectsof prescribed medications. All patient questions answered. Pt voiced understanding. Reviewed health maintenance. Instructed to continue current medications, diet andexercise. Patient agreed with treatment plan. Follow up as directed.      Electronicallysigned by Isamar Gabriel PA-C on 6/30/2020 at 11:28 AM

## 2020-07-07 ENCOUNTER — OFFICE VISIT (OUTPATIENT)
Dept: PODIATRY | Age: 70
End: 2020-07-07
Payer: COMMERCIAL

## 2020-07-07 VITALS — BODY MASS INDEX: 35.7 KG/M2 | HEIGHT: 69 IN | WEIGHT: 241 LBS | TEMPERATURE: 97.3 F

## 2020-07-07 PROCEDURE — 11721 DEBRIDE NAIL 6 OR MORE: CPT | Performed by: PODIATRIST

## 2020-07-07 PROCEDURE — 99999 PR OFFICE/OUTPT VISIT,PROCEDURE ONLY: CPT | Performed by: PODIATRIST

## 2020-07-09 NOTE — PROGRESS NOTES
[facility] [address] [id number] [address]  Dept: [phone]     PAIN PROGRESS NOTE  Date of patient's visit: 7/9/2020  Patient's Name:  Nelly Hernandez YOB: 1950            Patient Care Team:  Beulah Duncan PA-C as PCP - General (Physician Assistant)  Beulah Duncan PA-C as PCP - [facility] Empaneled Provider  Ishmael Belcher MD as Consulting Physician (Gastroenterology)  Sana Melendez MD as Consulting Physician [facility] Medicine)      Chief Complaint   Patient presents with    Nail Problem    Foot Pain    Peripheral Neuropathy       Subjective: This Nelly Hernandez comes to clinic for foot and nail care. Pt currently has complaint of thickened, painful, elongated nails that he/she cannot manage by themselves. Pt. Relates pain to nails with shoe gear. Pt's primary care physician is Beulah Duncan PA-C last seen 6/30/2020. Past Medical History:   Diagnosis Date    Anxiety     Hyperlipidemia     Hypertension        Allergies   Allergen Reactions    Cupric Oxide     Chrome Alum     Cobalt     Copper-Containing Compounds     Nickel      Current Outpatient Medications on File Prior to Visit   Medication Sig Dispense Refill    pilocarpine (SALAGEN) 5 MG tablet TAKE 1 TABLET BY MOUTH THREE TIMES A DAY 90 tablet 3    atorvastatin (LIPITOR) 10 MG tablet TAKE 1 TABLET BY MOUTH ONE TIME A DAY 90 tablet 3    calcium carbonate (OSCAL) 500 MG TABS tablet Take 500 mg by mouth daily      magnesium 30 MG tablet Take 30 mg by mouth 2 times daily      cholestyramine (QUESTRAN) 4 g packet Take 1 packet by mouth 2 times daily 90 packet 3    pregabalin (LYRICA) 50 MG capsule Take 1 capsule by mouth 3 times daily for 91 days.  90 capsule 2    allopurinol (ZYLOPRIM) 300 MG tablet TAKE 1 TABLET BY MOUTH ONE TIME A DAY 90 tablet 3    traZODone (DESYREL) 50 MG tablet Take 1 tablet by mouth nightly 30 tablet 2    omeprazole (PRILOSEC) 20 MG delayed release capsule TAKE 1 CAPSULE BY MOUTH ONE TIME A DAY. 90 capsule 3    citalopram (CELEXA) 10 MG tablet TAKE 1 TABLET BY MOUTH ONE TIME A DAY 30 tablet 5    topiramate (TOPAMAX) 50 MG tablet Take 1 tablet by mouth 2 times daily 60 tablet 5    clopidogrel (PLAVIX) 75 MG tablet Take 1 tablet by mouth daily 90 tablet 3    carvedilol (COREG) 6.25 MG tablet Take 1 tablet by mouth daily 30 tablet 2    Galcanezumab-gnlm (EMGALITY) 120 MG/ML SOAJ Inject into the skin      lisinopril (PRINIVIL;ZESTRIL) 10 MG tablet TAKE 1 TABLET BY MOUTH ONE TIME A DAY 30 tablet 5    Multiple Vitamins-Minerals (OCUVITE PRESERVISION PO) Take 1 tablet by mouth daily      Acetaminophen (TYLENOL EXTRA STRENGTH PO) Take by mouth Indications: prn      POTASSIUM GLUCONATE PO Take by mouth daily       No current facility-administered medications on file prior to visit. Review of Systems. Review of Systems:   History obtained from chart review and the patient  General ROS: negative for - chills, fatigue, fever, night sweats or weight gain  Constitutional: Negative for chills, diaphoresis, fatigue, fever and unexpected weight change. Musculoskeletal: Positive for arthralgias, gait problem and joint swelling. Neurological ROS: negative for - behavioral changes, confusion, headaches or seizures. Negative for weakness and numbness. Dermatological ROS: negative for - mole changes, rash  Cardiovascular: Negative for leg swelling. Gastrointestinal: Negative for constipation, diarrhea, nausea and vomiting. Objective:  Dermatologic Exam:  Skin lesion/ulceration Absent . Skin No rashes or nodules noted. .   Skin is thin, with flaky sloughing skin as well as decreased hair growth to the lower leg  Small red hemosiderin deposits seen dorsal foot   Musculoskeletal:     1st MPJ ROM decreased, Bilateral. Muscle strength 5/5, Bilateral.  Pain present upon palpation of toenails 1-5, Bilateral. decreased palpation. Q7   []Yes  []No                Q8   [x]Yes  []No                     Q9   []Yes    []No  Assessment:  68 y.o. male with:    Diagnosis Orders   1. Dermatophytosis of nail  74588 - DE DEBRIDEMENT OF NAILS, 6 OR MORE   2. Bilateral lower extremity pain  00807 - DE DEBRIDEMENT OF NAILS, 6 OR MORE   3. PVD (peripheral vascular disease) ([facility] [address]  [address]1977 - DE DEBRIDEMENT OF NAILS, 6 OR MORE   4. Ingrown nail  82698 - DE DEBRIDEMENT OF NAILS, 6 OR MORE         Plan:    Nails 1-10 were debrided in length and thickness sharply with a nail nipper and  without incident. Pt will follow up in 9 weeks or sooner if any problems arise. Diagnosis was discussed with the pt and all of their questions were answered in detail. Proper foot hygiene and care was discussed with the pt. Patient to check feet daily and contact the office with any questions/problems/concerns. Other comorbidity noted and will be managed by PCP. Pain waiver discussed with patient and confirmed.    7/7/2020      Electronically signed by Carola Guaman DPM on 7/9/2020 at 2:33 PM  7/7/2020

## 2020-08-14 ENCOUNTER — PROCEDURE VISIT (OUTPATIENT)
Dept: PRIMARY CARE CLINIC | Age: 70
End: 2020-08-14
Payer: COMMERCIAL

## 2020-08-14 VITALS
SYSTOLIC BLOOD PRESSURE: 134 MMHG | HEART RATE: 77 BPM | DIASTOLIC BLOOD PRESSURE: 82 MMHG | HEIGHT: 69 IN | WEIGHT: 239 LBS | TEMPERATURE: 97.7 F | BODY MASS INDEX: 35.4 KG/M2 | OXYGEN SATURATION: 96 %

## 2020-08-14 PROBLEM — H61.21 IMPACTED CERUMEN OF RIGHT EAR: Status: ACTIVE | Noted: 2020-08-14

## 2020-08-14 PROCEDURE — 17003 DESTRUCT PREMALG LES 2-14: CPT | Performed by: PHYSICIAN ASSISTANT

## 2020-08-14 PROCEDURE — 99213 OFFICE O/P EST LOW 20 MIN: CPT | Performed by: PHYSICIAN ASSISTANT

## 2020-08-14 PROCEDURE — 17000 DESTRUCT PREMALG LESION: CPT | Performed by: PHYSICIAN ASSISTANT

## 2020-08-14 NOTE — PROGRESS NOTES
[facility] Keratoses destruction procedure note:  8/14/2020  Glenda De Guzman  1950    /82   Pulse 77   Temp 97.7 °F (36.5 °C)   Ht 5' 9\" (1.753 m)   Wt 239 lb (108.4 kg)   SpO2 96%   BMI 35.29 kg/m²   ALL VITALS REVIEWED    Allergies   Allergen Reactions    Cupric Oxide     Chrome Alum     Cobalt     Copper-Containing Compounds     Nickel        Chief Complaint   Patient presents with    Skin Problem     Patient has a few spots on his face he would like looked at and possibly taken off.  Cerumen Impaction     Patient said his right ear is full of wax and would like a ear wash     Written consent was obtained. Lesion(s) cleansed with Alcohol. Location:  Face, forehead and cheek  Histofreeze applied with applicator. Numberlesions treated: 10    Physical Exam    Patient tolerated procedure well. Diagnosis Orders   1. Actinic keratosis     2. Impacted cerumen of right ear         Follow Up: Wound care discussed. Keep well moisturized. Watch for signs of infection which would include:  Redness, swelling, fever. If signs appear, please return to office. Return in about 2 weeks (around 8/28/2020) for 3 shave biopsies and cryotherapy.     Electronically signed by Anali Ireland PA-C on 8/14/2020 at 2:43 PM
11/22/2020    Colon cancer screen colonoscopy  04/22/2029    Pneumococcal 65+ years Vaccine  Completed    AAA screen  Completed    Hepatitis C screen  Completed    Hepatitis A vaccine  Aged Out    Hepatitis B vaccine  Aged Out    Hib vaccine  Aged Out    Meningococcal (ACWY) vaccine  Aged Out       Subjective:      Review of Systems    Objective:     /82   Pulse 77   Temp 97.7 °F (36.5 °C)   Ht 5' 9\" (1.753 m)   Wt 239 lb (108.4 kg)   SpO2 96%   BMI 35.29 kg/m²   Physical Exam  HENT:      Head:        Comments: Thick rough macules and papule (ear)     Scattered AKs on forehead, cheeks and temples treated today. Right Ear: There is impacted cerumen. Assessment:       Diagnosis Orders   1. Actinic keratosis     2. Impacted cerumen of right ear          Plan:    Froze 10 AKS with Histofreeze today   3 pictures and schedule back for 3 biopsies. Patient tolerated ear wash. Return in about 2 weeks (around 8/28/2020) for 3 shave biopsies and cryotherapy. No orders of the defined types were placed in this encounter. No orders of the defined types were placed in this encounter. Patient given educationalmaterials - see patient instructions. Discussed use, benefit, and side effectsof prescribed medications. All patient questions answered. Pt voiced understanding. Reviewed health maintenance. Instructed to continue current medications, diet andexercise. Patient agreed with treatment plan. Follow up as directed.      Electronicallysigned by Karina Moore PA-C on 8/14/2020 at 2:42 PM

## 2020-08-27 ENCOUNTER — TELEPHONE (OUTPATIENT)
Dept: PRIMARY CARE CLINIC | Age: 70
End: 2020-08-27

## 2020-08-27 NOTE — TELEPHONE ENCOUNTER
Pt wife calling and states that he is having a procedure tomorrow to remove some pre-cancerous cells. She is asking if he should stop his blood thinners for this procedure. Please advise.

## 2020-08-27 NOTE — TELEPHONE ENCOUNTER
Wong Jansen called back stating that you are removing some pre cancer cells on his ear and face tomorrow morning.

## 2020-08-28 ENCOUNTER — PROCEDURE VISIT (OUTPATIENT)
Dept: PRIMARY CARE CLINIC | Age: 70
End: 2020-08-28
Payer: COMMERCIAL

## 2020-08-28 ENCOUNTER — HOSPITAL ENCOUNTER (OUTPATIENT)
Age: 70
Setting detail: SPECIMEN
Discharge: HOME | End: 2020-08-28
Payer: COMMERCIAL

## 2020-08-28 VITALS
DIASTOLIC BLOOD PRESSURE: 82 MMHG | OXYGEN SATURATION: 97 % | WEIGHT: 235 LBS | BODY MASS INDEX: 34.8 KG/M2 | TEMPERATURE: 97.2 F | HEART RATE: 80 BPM | SYSTOLIC BLOOD PRESSURE: 128 MMHG | HEIGHT: 69 IN

## 2020-08-28 PROCEDURE — 11103 TANGNTL BX SKIN EA SEP/ADDL: CPT | Performed by: PHYSICIAN ASSISTANT

## 2020-08-28 PROCEDURE — 11102 TANGNTL BX SKIN SINGLE LES: CPT | Performed by: PHYSICIAN ASSISTANT

## 2020-08-28 NOTE — PROGRESS NOTES
Skin Biopsy Procedure Note  8/28/2020  Sheldon Robles  1950    /82   Pulse 80   Temp 97.2 °F (36.2 °C)   Ht 5' 9\" (1.753 m)   Wt 235 lb (106.6 kg)   SpO2 97%   BMI 34.70 kg/m²   VITALS REVIEWED    Allergies   Allergen Reactions    Cupric Oxide     Chrome Alum     Cobalt     Copper-Containing Compounds     Nickel        Chief Complaint   Patient presents with    Procedure     3 shave bx        Lesion:  · 1. Left temple  · 2. Right temple  · 3. Left ear      Indication for Biopsy 1: non healing lesion  Indication for Biopsy 2: non healing lesion  Indication for Biopsy 3:  Non healing lesion    Procedure: The lesion(s) were cleansed with Alcohol.  2% lidocaine with epinephrine was used for local anaesthesia. 1.  An 8 mm  shave was used to obtain a specimen. 2.  An 8 mm shave was used     3. Am 8 mm shave was used. The specimen(s) were  preserved and sent for pathological examination. The biopsy site(s) were  cleansed and hemostasis using ACl and a pressure dressing(s) were achieved with good results. The patient was instructed on wound care. No complications occurred and the patient tolerated the procedure well. Diagnosis Orders   1. Neoplasm of uncertain behavior of skin  Derm biopsy    Derm biopsy    Derm biopsy   2. Actinic keratosis         Follow Up: Wound care discussed. Keep well moisturized. Watch for signs of infection which would include:  Redness, swelling, fever. If signs appear, please return to office. Will use Efudex in the winter for the AKs on face. Return for Will call with results.        Electronically signed by Maryuri Turner PA-C on 8/28/2020 at 8:30 AM

## 2020-09-01 LAB — DERMATOLOGY PATHOLOGY REPORT: NORMAL

## 2020-09-08 ENCOUNTER — OFFICE VISIT (OUTPATIENT)
Dept: PODIATRY | Age: 70
End: 2020-09-08
Payer: COMMERCIAL

## 2020-09-08 VITALS — TEMPERATURE: 97.3 F | HEIGHT: 69 IN | BODY MASS INDEX: 34.8 KG/M2 | WEIGHT: 235 LBS

## 2020-09-08 PROCEDURE — 11721 DEBRIDE NAIL 6 OR MORE: CPT | Performed by: PODIATRIST

## 2020-09-08 PROCEDURE — 99999 PR OFFICE/OUTPT VISIT,PROCEDURE ONLY: CPT | Performed by: PODIATRIST

## 2020-09-10 ENCOUNTER — OFFICE VISIT (OUTPATIENT)
Dept: PRIMARY CARE CLINIC | Age: 70
End: 2020-09-10
Payer: COMMERCIAL

## 2020-09-10 VITALS
OXYGEN SATURATION: 97 % | WEIGHT: 234 LBS | TEMPERATURE: 97.7 F | DIASTOLIC BLOOD PRESSURE: 80 MMHG | HEART RATE: 77 BPM | SYSTOLIC BLOOD PRESSURE: 118 MMHG | BODY MASS INDEX: 34.66 KG/M2 | HEIGHT: 69 IN

## 2020-09-10 PROBLEM — D09.9 SQUAMOUS CELL CARCINOMA IN SITU: Status: ACTIVE | Noted: 2020-09-10

## 2020-09-10 PROCEDURE — 90694 VACC AIIV4 NO PRSRV 0.5ML IM: CPT | Performed by: PHYSICIAN ASSISTANT

## 2020-09-10 PROCEDURE — G0008 ADMIN INFLUENZA VIRUS VAC: HCPCS | Performed by: PHYSICIAN ASSISTANT

## 2020-09-10 PROCEDURE — 99213 OFFICE O/P EST LOW 20 MIN: CPT | Performed by: PHYSICIAN ASSISTANT

## 2020-09-10 RX ORDER — FLUOROURACIL 50 MG/G
CREAM TOPICAL
Qty: 40 G | Refills: 1 | Status: SHIPPED | OUTPATIENT
Start: 2020-09-10 | End: 2021-02-15

## 2020-09-10 RX ORDER — LORAZEPAM 0.5 MG/1
0.5 TABLET ORAL EVERY 6 HOURS PRN
Qty: 30 TABLET | Refills: 0 | Status: SHIPPED | OUTPATIENT
Start: 2020-09-10 | End: 2020-10-10

## 2020-09-10 ASSESSMENT — ENCOUNTER SYMPTOMS
COLOR CHANGE: 0
ABDOMINAL PAIN: 0
RESPIRATORY NEGATIVE: 1
EYES NEGATIVE: 1

## 2020-09-10 NOTE — PROGRESS NOTES
[facility] PRIMARY CARE  [address] [facility] [address]  Dept: [address] Cespedes is a 68 y.o. male who presents today for his medical conditions/complaintsas noted below. Chief Complaint   Patient presents with    Results     discuss derm results. HPI:     HPI  Path report shows SCC in situ fully removed on right temple and still present on left temple and left ear  He has several AKs all over the face. Biopsy sites are healing  LDL Calculated (mg/dL)   Date Value   10/23/2019 83   2017 84   2016 86       (goal LDL is <100)   BUN (mg/dL)   Date Value   10/23/2019 12     BP Readings from Last 3 Encounters:   09/10/20 118/80   20 128/82   20 134/82          (goal 120/80)    Past Medical History:   Diagnosis Date    Anxiety     Hyperlipidemia     Hypertension       Past Surgical History:   Procedure Laterality Date    APPENDECTOMY      BACK SURGERY      COLONOSCOPY  3/7/2012    tubular adenoma, hyperplastic polyp    JOINT REPLACEMENT      hip    OTHER SURGICAL HISTORY  2016    Spinal stimulator       Family History   Problem Relation Age of Onset    Heart Disease Father     Hypertension Father     Hypertension Brother     Stroke Brother     Tuberculosis Brother        Social History     Tobacco Use    Smoking status: Former Smoker     Packs/day: 2.00     Years: 35.00     Pack years: 70.00     Types: Cigarettes     Last attempt to quit: 2005     Years since quittin.8    Smokeless tobacco: Former User     Types: Chew   Substance Use Topics    Alcohol use: Yes     Alcohol/week: 35.0 - 42.0 standard drinks     Types: 35 - 42 Cans of beer per week     Comment: 6 pack daily      Current Outpatient Medications   Medication Sig Dispense Refill    LORazepam (ATIVAN) 0.5 MG tablet Take 1 tablet by mouth every 6 hours as needed for Anxiety for up to 30 days.  30 tablet 0    fluorouracil (EFUDEX) 5 % cream Apply topically 2 times daily for not more than 4 weeks 40 g 1    pregabalin (LYRICA) 50 MG capsule TAKE 1 CAPSULE BY MOUTH THREE TIMES A DAY  90 capsule 1    pilocarpine (SALAGEN) 5 MG tablet TAKE 1 TABLET BY MOUTH THREE TIMES A DAY 90 tablet 3    atorvastatin (LIPITOR) 10 MG tablet TAKE 1 TABLET BY MOUTH ONE TIME A DAY 90 tablet 3    calcium carbonate (OSCAL) 500 MG TABS tablet Take 500 mg by mouth daily      magnesium 30 MG tablet Take 30 mg by mouth 2 times daily      cholestyramine (QUESTRAN) 4 g packet Take 1 packet by mouth 2 times daily 90 packet 3    allopurinol (ZYLOPRIM) 300 MG tablet TAKE 1 TABLET BY MOUTH ONE TIME A DAY 90 tablet 3    traZODone (DESYREL) 50 MG tablet Take 1 tablet by mouth nightly 30 tablet 2    omeprazole (PRILOSEC) 20 MG delayed release capsule TAKE 1 CAPSULE BY MOUTH ONE TIME A DAY. 90 capsule 3    citalopram (CELEXA) 10 MG tablet TAKE 1 TABLET BY MOUTH ONE TIME A DAY 30 tablet 5    topiramate (TOPAMAX) 50 MG tablet Take 1 tablet by mouth 2 times daily 60 tablet 5    clopidogrel (PLAVIX) 75 MG tablet Take 1 tablet by mouth daily 90 tablet 3    carvedilol (COREG) 6.25 MG tablet Take 1 tablet by mouth daily 30 tablet 2    Galcanezumab-gnlm (EMGALITY) 120 MG/ML SOAJ Inject into the skin      lisinopril (PRINIVIL;ZESTRIL) 10 MG tablet TAKE 1 TABLET BY MOUTH ONE TIME A DAY 30 tablet 5    Multiple Vitamins-Minerals (OCUVITE PRESERVISION PO) Take 1 tablet by mouth daily      Acetaminophen (TYLENOL EXTRA STRENGTH PO) Take by mouth Indications: prn      POTASSIUM GLUCONATE PO Take by mouth daily       No current facility-administered medications for this visit.       Allergies   Allergen Reactions    Cupric Oxide     Chrome Alum     Cobalt     Copper-Containing Compounds     Nickel        Health Maintenance   Topic Date Due    DTaP/Tdap/Td vaccine (1 - Tdap) 12/30/1967    Shingles Vaccine (1 of 2) 12/30/1998    Flu vaccine (1) 09/01/2020    Annual Wellness Visit (AWV)  10/16/2020    Lipid screen  10/23/2020    Potassium monitoring  10/23/2020    Creatinine monitoring  10/23/2020    Low dose CT lung screening  11/22/2020    Colon cancer screen colonoscopy  04/22/2029    Pneumococcal 65+ years Vaccine  Completed    AAA screen  Completed    Hepatitis C screen  Completed    Hepatitis A vaccine  Aged Out    Hepatitis B vaccine  Aged Out    Hib vaccine  Aged Out    Meningococcal (ACWY) vaccine  Aged Out       Subjective:      Review of Systems   Constitutional: Negative for chills, diaphoresis, fever and unexpected weight change. HENT: Negative. Negative for mouth sores. Eyes: Negative. Respiratory: Negative. Cardiovascular: Negative. Gastrointestinal: Negative for abdominal pain. Musculoskeletal: Negative for arthralgias and myalgias. Skin: Negative for color change, pallor, rash and wound. Allergic/Immunologic: Negative for environmental allergies, food allergies and immunocompromised state. Hematological: Negative for adenopathy. Objective:     /80   Pulse 77   Temp 97.7 °F (36.5 °C)   Ht 5' 9\" (1.753 m)   Wt 234 lb (106.1 kg)   SpO2 97%   BMI 34.56 kg/m²   Physical Exam  Vitals signs and nursing note reviewed. Constitutional:       Appearance: Normal appearance. He is not ill-appearing. Skin:     Comments: Biopsy sites are healing. Several AKs on face and ears. Neurological:      Mental Status: He is alert. Assessment:       Diagnosis Orders   1. Squamous cell carcinoma in situ     2. Anxiety  LORazepam (ATIVAN) 0.5 MG tablet   3. Need for vaccination  INFLUENZA, QUADV, ADJUVANTED, 65 YRS =, IM, PF, PREFILL SYR, 0.5ML (FLUAD)   4. Actinic keratosis          Plan:    Efudex bid x 4 weeks to full non hair bearing areas of the face up to hairline and ears  Cicalfate or Aquaphor for moisturixing thru the process  Wait to start until the original biospy sites are healed.     Return in about 10 weeks (around 11/19/2020) for recheck AKs and SCC insitu on face---schedule on a Friday . Orders Placed This Encounter   Procedures    INFLUENZA, QUADV, ADJUVANTED, 72 YRS =, IM, PF, PREFILL SYR, 0.5ML (FLUAD)     Orders Placed This Encounter   Medications    LORazepam (ATIVAN) 0.5 MG tablet     Sig: Take 1 tablet by mouth every 6 hours as needed for Anxiety for up to 30 days. Dispense:  30 tablet     Refill:  0    fluorouracil (EFUDEX) 5 % cream     Sig: Apply topically 2 times daily for not more than 4 weeks     Dispense:  40 g     Refill:  1       Patient given educationalmaterials - see patient instructions. Discussed use, benefit, and side effectsof prescribed medications. All patient questions answered. Pt voiced understanding. Reviewed health maintenance. Instructed to continue current medications, diet andexercise. Patient agreed with treatment plan. Follow up as directed.      Electronicallysigned by Beulah Duncan PA-C on 9/10/2020 at 10:12 AM

## 2020-09-21 ENCOUNTER — OFFICE VISIT (OUTPATIENT)
Dept: GASTROENTEROLOGY | Age: 70
End: 2020-09-21
Payer: COMMERCIAL

## 2020-09-21 VITALS — TEMPERATURE: 96.8 F | HEIGHT: 69 IN | BODY MASS INDEX: 35.25 KG/M2 | WEIGHT: 238 LBS

## 2020-09-21 PROCEDURE — 99214 OFFICE O/P EST MOD 30 MIN: CPT | Performed by: NURSE PRACTITIONER

## 2020-09-21 ASSESSMENT — ENCOUNTER SYMPTOMS
CHEST TIGHTNESS: 0
SINUS PAIN: 0
VOMITING: 0
CONSTIPATION: 0
BACK PAIN: 1
ABDOMINAL PAIN: 1
WHEEZING: 0
NAUSEA: 0
DIARRHEA: 1
RECTAL PAIN: 0
ABDOMINAL DISTENTION: 1
BLOOD IN STOOL: 0
RESPIRATORY NEGATIVE: 1
ANAL BLEEDING: 0
SINUS PRESSURE: 0
EYE REDNESS: 0
ALLERGIC/IMMUNOLOGIC NEGATIVE: 1
EYE PAIN: 0
SHORTNESS OF BREATH: 0
TROUBLE SWALLOWING: 0

## 2020-09-21 NOTE — PROGRESS NOTES
GI CLINIC FOLLOW UP    INTERVAL HISTORY:   No referring provider defined for this encounter. Chief Complaint   Patient presents with    Follow-up     Patient is here today for a 3 month f/u     HISTORY OF PRESENT ILLNESS:     Patient being seen for follow-up of abdominal pain, bloating, diarrhea. Patient still has loose watery stools twice daily. No hematochezia, melena. Has feeling of fullness and bloating. Has early satiety. Has chronic GERD managed with omeprazole. No recent weight loss, weight gain. No fevers, chills. Still has nonspecific right upper quadrant pain. Ultrasound last year negative. CT abdomen pelvis revealed possible thickening of the gastric wall versus artifact. [place] to have IBS in the past.    Labs and diarrhea work-up nonspecific. Past Medical,Family, and Social History reviewed and does contribute to the patient presentingcondition. Patient's PMH/PSH,SH,PSYCH Hx, MEDs, ALLERGIES, and ROS were all reviewed and updated in the appropriate sections. PAST MEDICAL HISTORY:  Past Medical History:   Diagnosis Date    Anxiety     Hyperlipidemia     Hypertension        Past Surgical History:   Procedure Laterality Date    APPENDECTOMY      BACK SURGERY      COLONOSCOPY  3/7/2012    tubular adenoma, hyperplastic polyp    JOINT REPLACEMENT      hip    OTHER SURGICAL HISTORY  08/29/2016    Spinal stimulator       CURRENT MEDICATIONS:    Current Outpatient Medications:     OnabotulinumtoxinA (BOTOX IJ), Inject as directed Patient is taking for migraines, Disp: , Rfl:     LORazepam (ATIVAN) 0.5 MG tablet, Take 1 tablet by mouth every 6 hours as needed for Anxiety for up to 30 days. , Disp: 30 tablet, Rfl: 0    fluorouracil (EFUDEX) 5 % cream, Apply topically 2 times daily for not more than 4 weeks, Disp: 40 g, Rfl: 1    pregabalin (LYRICA) 50 MG capsule, TAKE 1 CAPSULE BY MOUTH THREE TIMES A DAY , Disp: 90 capsule, Rfl: 1    pilocarpine (SALAGEN) 5 MG tablet, TAKE 1 TABLET BY MOUTH THREE TIMES A DAY, Disp: 90 tablet, Rfl: 3    atorvastatin (LIPITOR) 10 MG tablet, TAKE 1 TABLET BY MOUTH ONE TIME A DAY, Disp: 90 tablet, Rfl: 3    calcium carbonate (OSCAL) 500 MG TABS tablet, Take 500 mg by mouth daily, Disp: , Rfl:     magnesium 30 MG tablet, Take 30 mg by mouth 2 times daily, Disp: , Rfl:     allopurinol (ZYLOPRIM) 300 MG tablet, TAKE 1 TABLET BY MOUTH ONE TIME A DAY, Disp: 90 tablet, Rfl: 3    traZODone (DESYREL) 50 MG tablet, Take 1 tablet by mouth nightly, Disp: 30 tablet, Rfl: 2    omeprazole (PRILOSEC) 20 MG delayed release capsule, TAKE 1 CAPSULE BY MOUTH ONE TIME A DAY., Disp: 90 capsule, Rfl: 3    citalopram (CELEXA) 10 MG tablet, TAKE 1 TABLET BY MOUTH ONE TIME A DAY, Disp: 30 tablet, Rfl: 5    topiramate (TOPAMAX) 50 MG tablet, Take 1 tablet by mouth 2 times daily, Disp: 60 tablet, Rfl: 5    clopidogrel (PLAVIX) 75 MG tablet, Take 1 tablet by mouth daily, Disp: 90 tablet, Rfl: 3    carvedilol (COREG) 6.25 MG tablet, Take 1 tablet by mouth daily, Disp: 30 tablet, Rfl: 2    Galcanezumab-gnlm (EMGALITY) 120 MG/ML SOAJ, Inject into the skin, Disp: , Rfl:     lisinopril (PRINIVIL;ZESTRIL) 10 MG tablet, TAKE 1 TABLET BY MOUTH ONE TIME A DAY, Disp: 30 tablet, Rfl: 5    Multiple Vitamins-Minerals (OCUVITE PRESERVISION PO), Take 1 tablet by mouth daily, Disp: , Rfl:     Acetaminophen (TYLENOL EXTRA STRENGTH PO), Take by mouth Indications: prn, Disp: , Rfl:     POTASSIUM GLUCONATE PO, Take by mouth daily, Disp: , Rfl:     cholestyramine (QUESTRAN) 4 g packet, Take 1 packet by mouth 2 times daily (Patient not taking: Reported on 9/21/2020), Disp: 90 packet, Rfl: 3    ALLERGIES:   Allergies   Allergen Reactions    Cupric Oxide     Chrome Alum     Cobalt     Copper-Containing Compounds     Nickel        FAMILY HISTORY:       Problem Relation Age of Onset    Heart Disease Father     Hypertension Father     Hypertension Brother     Stroke Brother     Tuberculosis Brother          SOCIAL HISTORY:   Social History     Socioeconomic History    Marital status:      Spouse name: Not on file    Number of children: Not on file    Years of education: Not on file    Highest education level: Not on file   Occupational History    Not on file   Social Needs    Financial resource strain: Not on file    Food insecurity     Worry: Not on file     Inability: Not on file   [employer] needs     Medical: Not on file     Non-medical: Not on file   Tobacco Use    Smoking status: Former Smoker     Packs/day: 2.00     Years: 35.00     Pack years: 70.00     Types: Cigarettes     Last attempt to quit: 2005     Years since quittin.8    Smokeless tobacco: Former User     Types: Chew   Substance and Sexual Activity    Alcohol use: Yes     Alcohol/week: 35.0 - 42.0 standard drinks     Types: 35 - 42 Cans of beer per week     Comment: 6 pack daily    Drug use: No    Sexual activity: Not on file   Lifestyle    Physical activity     Days per week: Not on file     Minutes per session: Not on file    Stress: Not on file   Relationships    Social connections     Talks on phone: Not on file     Gets together: Not on file     Attends Temple service: Not on file     Active member of club or organization: Not on file     Attends meetings of clubs or organizations: Not on file     Relationship status: Not on file    Intimate partner violence     Fear of current or ex partner: Not on file     Emotionally abused: Not on file     Physically abused: Not on file     Forced sexual activity: Not on file   Other Topics Concern    Not on file   Social History Narrative    Not on file       REVIEW OF SYSTEMS: A 12-point review of systemswas obtained and pertinent positives and negatives were enumerated above in the history of present illness. All other reviewed systems / symptoms were negative.     Review of Systems   Constitutional: Positive for appetite change (decreased) and fatigue. Negative for unexpected weight change (decreased). HENT: Negative for dental problem, sinus pressure, sinus pain and trouble swallowing. Eyes: Positive for visual disturbance (glasses). Negative for pain and redness. Respiratory: Negative. Negative for chest tightness, shortness of breath and wheezing. Cardiovascular: Negative. Negative for chest pain, palpitations and leg swelling. Gastrointestinal: Positive for abdominal distention, abdominal pain (RUQ) and diarrhea. Negative for anal bleeding, blood in stool, constipation, nausea, rectal pain and vomiting. Endocrine: Negative. Negative for cold intolerance and heat intolerance. Genitourinary: Positive for frequency and urgency. Negative for difficulty urinating. Musculoskeletal: Positive for arthralgias, back pain and neck pain. Skin: Negative. Allergic/Immunologic: Negative. Negative for environmental allergies and food allergies. Neurological: Positive for dizziness, light-headedness and headaches. Negative for weakness. Hematological: Bruises/bleeds easily. Psychiatric/Behavioral: Positive for sleep disturbance. Negative for agitation. The patient is nervous/anxious. PHYSICAL EXAMINATION: Vital signs reviewed per the nursing documentation. Temp 96.8 °F (36 °C)   Ht 5' 9\" (1.753 m)   Wt 238 lb (108 kg)   BMI 35.15 kg/m²   Body mass index is 35.15 kg/m². Physical Exam  Vitals signs and nursing note reviewed. Constitutional:       General: He is not in acute distress. Appearance: He is well-developed. He is obese. HENT:      Head: Normocephalic and atraumatic. Mouth/Throat:      Pharynx: No oropharyngeal exudate. Eyes:      General: No scleral icterus. Conjunctiva/sclera: Conjunctivae normal.      Pupils: Pupils are equal, round, and reactive to light. Neck:      Musculoskeletal: Normal range of motion and neck supple. Thyroid: No thyromegaly.       Trachea: No tracheal deviation. Cardiovascular:      Rate and Rhythm: Normal rate and regular rhythm. Heart sounds: Normal heart sounds. No murmur. Pulmonary:      Effort: Pulmonary effort is normal. No respiratory distress. Breath sounds: Normal breath sounds. No wheezing or rales. Abdominal:      General: Bowel sounds are normal. There is no distension. Palpations: Abdomen is soft. There is no hepatomegaly or mass. Tenderness: There is abdominal tenderness. There is no guarding or rebound. Hernia: No hernia is present. Lymphadenopathy:      Cervical: No cervical adenopathy. Skin:     General: Skin is warm and dry. Findings: No erythema or rash. Neurological:      Mental Status: He is alert and oriented to person, place, and time. Cranial Nerves: No cranial nerve deficit. Psychiatric:         Thought Content: Thought content normal.           LABORATORY DATA: Reviewed  Lab Results   Component Value Date    WBC 5.5 03/07/2012    HGB 15.8 03/07/2012    HCT 44.8 03/07/2012    MCV 92.1 03/07/2012     03/07/2012     10/23/2019    K 4.2 10/23/2019     10/23/2019    CO2 27 10/23/2019    BUN 12 10/23/2019    CREATININE 0.66 10/23/2019         Lab Results   Component Value Date    RBC 4.86 03/07/2012    HGB 15.8 03/07/2012    MCV 92.1 03/07/2012    MCH 32.5 03/07/2012    MCHC 35.3 03/07/2012    RDW 12.9 03/07/2012    MPV 7.4 03/07/2012    BASOPCT 3 (H) 03/07/2012    LYMPHSABS 1.10 03/07/2012    MONOSABS 0.40 03/07/2012    NEUTROABS 3.50 03/07/2012    EOSABS 0.30 03/07/2012    BASOSABS 0.20 03/07/2012         DIAGNOSTIC TESTING:     No results found. IMPRESSION: Mr. Christie Naylor is a 68 y.o. male with    Diagnosis Orders   1. Abdominal pain, right upper quadrant  EGD   2. Abnormal finding on imaging  EGD     Earlier this year patient  had CT abdomen pelvis with no acute intraperitoneal process noted. Noted to have diverticulosis.   Questionable gastric wall thickening versus artifact was noted. Patient is advised to have EGD to evaluate CT findings and this will be arranged. Patient also continues to have right upper quadrant pain intermittent. Has associated diarrhea and bloating. he had abdominal ultrasound last year revealing no intra-or extrahepatic biliary dilation. Interval cholecystectomy about 3 years ago. LFTs within normal limits. History of pancreatitis. Labs reviewed and unremarkable. Probable IBS. Brochures given. Patient reassured    The patient was counseled at length about the risks of tomas Covid-19 during their perioperative period and any recovery window from their procedure. The patient was made aware that tomas Covid-19  may worsen their prognosis for recovering from their procedure  and lend to a higher morbidity and/or mortality risk. All material risks, benefits, and reasonable alternatives including postponing the procedure were discussed. The patient does wish to proceed with the procedure at this time. The Endoscopic procedure was explained to the patient in detail  The prep and NPO were explained  All the Risks, Benefits, and Alternatives were explained  Risk of Bleeding, Perforation and Cardio Respiratory risks were explained  his questions were answered  The procedure has been scheduled with the  in the office  Patient was asked to give us a call for any questions  The patient has verbalized understanding and agreement to this plan. Thank you for allowing me to participate in the care of Mr. Dolores Rand. For any further questions please do not hesitate to contact me. I have reviewed and agree with the ROS entered by the MA/AUDRA.          HARINDER Aguillon    [facility]  Office #: [phone]

## 2020-09-22 PROBLEM — E66.9 OBESITY (BMI 35.0-39.9 WITHOUT COMORBIDITY): Status: ACTIVE | Noted: 2020-07-08

## 2020-09-22 PROBLEM — Z86.59 HISTORY OF DEPRESSION: Status: ACTIVE | Noted: 2020-07-08

## 2020-09-23 ENCOUNTER — TELEPHONE (OUTPATIENT)
Dept: GASTROENTEROLOGY | Age: 70
End: 2020-09-23

## 2020-09-23 NOTE — TELEPHONE ENCOUNTER
Clearance received for patient to hold Plavix for 5 days prior to EGD scheduled 10/15/20. Informed wife Avi Hernandez.

## 2020-10-01 ENCOUNTER — OFFICE VISIT (OUTPATIENT)
Dept: PRIMARY CARE CLINIC | Age: 70
End: 2020-10-01
Payer: COMMERCIAL

## 2020-10-01 VITALS
BODY MASS INDEX: 35.1 KG/M2 | WEIGHT: 237 LBS | SYSTOLIC BLOOD PRESSURE: 120 MMHG | OXYGEN SATURATION: 97 % | TEMPERATURE: 96.6 F | HEIGHT: 69 IN | HEART RATE: 70 BPM | DIASTOLIC BLOOD PRESSURE: 78 MMHG

## 2020-10-01 PROCEDURE — 90715 TDAP VACCINE 7 YRS/> IM: CPT | Performed by: PHYSICIAN ASSISTANT

## 2020-10-01 PROCEDURE — 99213 OFFICE O/P EST LOW 20 MIN: CPT | Performed by: PHYSICIAN ASSISTANT

## 2020-10-01 PROCEDURE — 90471 IMMUNIZATION ADMIN: CPT | Performed by: PHYSICIAN ASSISTANT

## 2020-10-01 NOTE — PROGRESS NOTES
[facility] PRIMARY CARE  [address] [facility] [address]  Dept: [address] Cespedes is a 68 y.o. male who presents today for his medical conditions/complaintsas noted below. Chief Complaint   Patient presents with    Other     patient said he was to follow up after using Efudex on his face. patient said he just started 1 week ago. HPI:     HPI  Efudex has been applied now for 1 week on face and is irritated all over and left ear has a a very tender spot. Will be having an EGD in 2 weeks. LDL Calculated (mg/dL)   Date Value   10/23/2019 83   2017 84   2016 86       (goal LDL is <100)   BUN (mg/dL)   Date Value   10/23/2019 12     BP Readings from Last 3 Encounters:   10/01/20 120/78   09/10/20 118/80   20 128/82          (goal 120/80)    Past Medical History:   Diagnosis Date    Anxiety     Hyperlipidemia     Hypertension       Past Surgical History:   Procedure Laterality Date    APPENDECTOMY      BACK SURGERY      COLONOSCOPY  3/7/2012    tubular adenoma, hyperplastic polyp    JOINT REPLACEMENT      hip    OTHER SURGICAL HISTORY  2016    Spinal stimulator       Family History   Problem Relation Age of Onset    Heart Disease Father     Hypertension Father     Hypertension Brother     Stroke Brother     Tuberculosis Brother        Social History     Tobacco Use    Smoking status: Former Smoker     Packs/day: 2.00     Years: 35.00     Pack years: 70.00     Types: Cigarettes     Last attempt to quit: 2005     Years since quittin.8    Smokeless tobacco: Former User     Types: Chew   Substance Use Topics    Alcohol use:  Yes     Alcohol/week: 35.0 - 42.0 standard drinks     Types: 35 - 42 Cans of beer per week     Comment: 6 pack daily      Current Outpatient Medications   Medication Sig Dispense Refill    citalopram (CELEXA) 10 MG tablet TAKE 1 TABLET BY MOUTH ONE TIME A DAY  30 tablet 5  OnabotulinumtoxinA (BOTOX IJ) Inject as directed Patient is taking for migraines      LORazepam (ATIVAN) 0.5 MG tablet Take 1 tablet by mouth every 6 hours as needed for Anxiety for up to 30 days. 30 tablet 0    fluorouracil (EFUDEX) 5 % cream Apply topically 2 times daily for not more than 4 weeks 40 g 1    pregabalin (LYRICA) 50 MG capsule TAKE 1 CAPSULE BY MOUTH THREE TIMES A DAY  90 capsule 1    pilocarpine (SALAGEN) 5 MG tablet TAKE 1 TABLET BY MOUTH THREE TIMES A DAY 90 tablet 3    atorvastatin (LIPITOR) 10 MG tablet TAKE 1 TABLET BY MOUTH ONE TIME A DAY 90 tablet 3    calcium carbonate (OSCAL) 500 MG TABS tablet Take 500 mg by mouth daily      magnesium 30 MG tablet Take 30 mg by mouth 2 times daily      allopurinol (ZYLOPRIM) 300 MG tablet TAKE 1 TABLET BY MOUTH ONE TIME A DAY 90 tablet 3    traZODone (DESYREL) 50 MG tablet Take 1 tablet by mouth nightly 30 tablet 2    omeprazole (PRILOSEC) 20 MG delayed release capsule TAKE 1 CAPSULE BY MOUTH ONE TIME A DAY. 90 capsule 3    topiramate (TOPAMAX) 50 MG tablet Take 1 tablet by mouth 2 times daily 60 tablet 5    clopidogrel (PLAVIX) 75 MG tablet Take 1 tablet by mouth daily 90 tablet 3    carvedilol (COREG) 6.25 MG tablet Take 1 tablet by mouth daily 30 tablet 2    Galcanezumab-gnlm (EMGALITY) 120 MG/ML SOAJ Inject into the skin      lisinopril (PRINIVIL;ZESTRIL) 10 MG tablet TAKE 1 TABLET BY MOUTH ONE TIME A DAY 30 tablet 5    Multiple Vitamins-Minerals (OCUVITE PRESERVISION PO) Take 1 tablet by mouth daily      Acetaminophen (TYLENOL EXTRA STRENGTH PO) Take by mouth Indications: prn      POTASSIUM GLUCONATE PO Take by mouth daily      cholestyramine (QUESTRAN) 4 g packet Take 1 packet by mouth 2 times daily (Patient not taking: Reported on 9/21/2020) 90 packet 3     No current facility-administered medications for this visit.       Allergies   Allergen Reactions    Cupric Oxide     Chrome Alum     Cobalt     Copper-Containing Compounds     Nickel        Health Maintenance   Topic Date Due    DTaP/Tdap/Td vaccine (1 - Tdap) 12/30/1967    Shingles Vaccine (1 of 2) 12/30/1998    Annual Wellness Visit (AWV)  10/16/2020    Lipid screen  10/23/2020    Potassium monitoring  10/23/2020    Creatinine monitoring  10/23/2020    Low dose CT lung screening  11/22/2020    Colon cancer screen colonoscopy  04/22/2029    Flu vaccine  Completed    Pneumococcal 65+ years Vaccine  Completed    AAA screen  Completed    Hepatitis C screen  Completed    Hepatitis A vaccine  Aged Out    Hepatitis B vaccine  Aged Out    Hib vaccine  Aged Out    Meningococcal (ACWY) vaccine  Aged Out       Subjective:      Review of Systems    Objective:     /78   Pulse 70   Temp 96.6 °F (35.9 °C)   Ht 5' 9\" (1.753 m)   Wt 237 lb (107.5 kg)   SpO2 97%   BMI 35.00 kg/m²   Physical Exam  Vitals signs and nursing note reviewed. Skin:     Comments: Has irritation all over face and ears where Efudex is working--see picture. Assessment:       Diagnosis Orders   1. Actinic keratosis     2. Need for vaccination  Tdap (age 6y and older) IM ([employer])        Plan:    Continue the Efudex bid and, if ear gets too tender, stop there. Cicalfate for several times daily use to help irritation. Return in about 5 weeks (around 11/5/2020), or if symptoms worsen or fail to improve, for on Friday for possible shave biopsy and recheck after Efudex use. No orders of the defined types were placed in this encounter. No orders of the defined types were placed in this encounter. Patient given educationalmaterials - see patient instructions. Discussed use, benefit, and side effectsof prescribed medications. All patient questions answered. Pt voiced understanding. Reviewed health maintenance. Instructed to continue current medications, diet andexercise. Patient agreed with treatment plan. Follow up as directed.      Electronicallysigned by Isaiah Dutta PA-C on 10/1/2020 at 9:25 AM

## 2020-10-12 ENCOUNTER — TELEPHONE (OUTPATIENT)
Dept: PRIMARY CARE CLINIC | Age: 70
End: 2020-10-12

## 2020-10-12 NOTE — TELEPHONE ENCOUNTER
Pt wife calling and states that the pt has been using the fluorouracil (EFUDEX) 5 % cream and it is very painful and is making his face a burgundy red color. She is asking if he should continue to take this? Please advise.

## 2020-10-12 NOTE — TELEPHONE ENCOUNTER
Reduce Efudex to once daily and make sure that he is moisturizing at least a couple times a day to help resolve the irritation it causes. I suggest that he use Cicalfate on the face and the [place] water if he is super uncomfortable.

## 2020-11-06 ENCOUNTER — OFFICE VISIT (OUTPATIENT)
Dept: PRIMARY CARE CLINIC | Age: 70
End: 2020-11-06
Payer: COMMERCIAL

## 2020-11-06 VITALS
DIASTOLIC BLOOD PRESSURE: 70 MMHG | BODY MASS INDEX: 35.19 KG/M2 | HEIGHT: 69 IN | HEART RATE: 77 BPM | SYSTOLIC BLOOD PRESSURE: 122 MMHG | TEMPERATURE: 96.8 F | WEIGHT: 237.6 LBS | OXYGEN SATURATION: 98 %

## 2020-11-06 PROCEDURE — 11102 TANGNTL BX SKIN SINGLE LES: CPT | Performed by: PHYSICIAN ASSISTANT

## 2020-11-06 PROCEDURE — 17000 DESTRUCT PREMALG LESION: CPT | Performed by: PHYSICIAN ASSISTANT

## 2020-11-06 PROCEDURE — 17003 DESTRUCT PREMALG LES 2-14: CPT | Performed by: PHYSICIAN ASSISTANT

## 2020-11-06 PROCEDURE — 99212 OFFICE O/P EST SF 10 MIN: CPT | Performed by: PHYSICIAN ASSISTANT

## 2020-11-06 RX ORDER — TRETINOIN 0.4 MG/G
GEL TOPICAL
Qty: 1 TUBE | Refills: 2 | Status: SHIPPED | OUTPATIENT
Start: 2020-11-06 | End: 2020-12-04

## 2020-11-06 ASSESSMENT — ENCOUNTER SYMPTOMS
COLOR CHANGE: 0
RESPIRATORY NEGATIVE: 1
ABDOMINAL PAIN: 0
EYES NEGATIVE: 1

## 2020-11-06 NOTE — PROGRESS NOTES
[facility] Keratoses destruction procedure note:  11/6/2020  Sabra Levy  1950    /70   Pulse 77   Temp 96.8 °F (36 °C)   Ht 5' 9\" (1.753 m)   Wt 237 lb 9.6 oz (107.8 kg)   SpO2 98%   BMI 35.09 kg/m²   ALL VITALS REVIEWED    Allergies   Allergen Reactions    Cupric Oxide     Chrome Alum     Cobalt     Copper-Containing Compounds     Nickel        Chief Complaint   Patient presents with    Procedure     possible shave bx. Check Efudex use on face. Written consent was obtained. Lesion(s) cleansed with Alcohol. Location:  Left ear and left temple    Histofreeze applied with applicator. Numberlesions treated: 2    Physical Exam  HENT:      Head:        Ears:           Patient tolerated procedure well. Diagnosis Orders   1. Actinic keratosis  RI DESTRUC PREMALIGNANT, FIRST LESION    RI DESTRUC PREMALIGNANT,2-14 LESIONS   2. Neoplasm of uncertain behavior of skin  Derm biopsy   3. Comedonal acne  tretinoin microspheres (RETIN-A MICRO) 0.04 % gel       Follow Up: Wound care discussed. Keep well moisturized. Watch for signs of infection which would include:  Redness, swelling, fever. If signs appear, please return to office. Return in about 3 months (around 2/6/2021) for recheck face and ears.     Electronically signed by Afsaneh Odonnell PA-C on 11/6/2020 at 10:04 AM

## 2020-11-06 NOTE — PROGRESS NOTES
Skin Spot Check    11/6/2020  Rob Stephens  1950  Chief Complaint   Patient presents with    Procedure     possible shave bx. Check Efudex use on face. Location:  Face and ears after Efudex use  Duration:  Used the Efudex for 4 weeks   Itch:  Yes  Bleed:  No  History of skin cancer: Yes   SCC   Patient notes that all comedones on nose went away but are now returning. Review of Systems   Constitutional: Negative for chills, diaphoresis, fever and unexpected weight change. HENT: Negative. Negative for mouth sores. Eyes: Negative. Respiratory: Negative. Cardiovascular: Negative. Gastrointestinal: Negative for abdominal pain. Musculoskeletal: Negative for arthralgias and myalgias. Skin: Negative for color change, pallor, rash and wound. Allergic/Immunologic: Negative for environmental allergies, food allergies and immunocompromised state. Hematological: Negative for adenopathy. Physical Exam  Skin:     Comments: Face and ears surveyed well and one large AK found on left ear lower than original site biopsied. All areas biopsed are free of lesion. Left temple with small AK still present     Winston Blonder is very bumpy with some pearly papules. Wife said this really lit up with Efudex. ICD-10-CM    1. Actinic keratosis  L57.0 IN DESTRUC PREMALIGNANT, FIRST LESION     IN DESTRUC PREMALIGNANT,2-14 LESIONS   2. Neoplasm of uncertain behavior of skin  D48.5 Derm biopsy   3.  Comedonal acne  L70.0 tretinoin microspheres (RETIN-A MICRO) 0.04 % gel         Reviewed proper skin care and sun protection  Brochure given on skin cancer  Procedures documented      Suzanne Heath, [facility]

## 2020-11-06 NOTE — PROGRESS NOTES
Skin Biopsy Procedure Note  11/6/2020  Evalina Days  1950    /70   Pulse 77   Temp 96.8 °F (36 °C)   Ht 5' 9\" (1.753 m)   Wt 237 lb 9.6 oz (107.8 kg)   SpO2 98%   BMI 35.09 kg/m²   VITALS REVIEWED    Allergies   Allergen Reactions    Cupric Oxide     Chrome Alum     Cobalt     Copper-Containing Compounds     Nickel        Chief Complaint   Patient presents with    Procedure     possible shave bx. Check Efudex use on face. Lesion:  · 1. Chin      Indication for Biopsy 1: Red bumpy skin that lit up with efudex      Procedure: The lesion(s) was cleansed with Alcohol.  2% lidocaine with epinephrine was used for local anaesthesia. A 4 mm  shave was used to obtain a specimen. The specimen(s) was    preserved and sent for pathological examination. The biopsy site(s) was  cleansed and hemostasis using ACl and a pressure dressing(s) was achieved with good results. The patient was instructed on wound care. No complications occurred and the patient tolerated the procedure well. Diagnosis Orders   1. Actinic keratosis  GA DESTRUC PREMALIGNANT, FIRST LESION    GA DESTRUC PREMALIGNANT,2-14 LESIONS   2. Neoplasm of uncertain behavior of skin  Derm biopsy   3. Comedonal acne  tretinoin microspheres (RETIN-A MICRO) 0.04 % gel       Follow Up: Wound care discussed. Keep well moisturized. Watch for signs of infection which would include:  Redness, swelling, fever. If signs appear, please return to office. Return in about 3 months (around 2/6/2021) for recheck face and ears.        Electronically signed by Anna Berman PA-C on 11/6/2020 at 10:03 AM

## 2020-11-18 ENCOUNTER — OFFICE VISIT (OUTPATIENT)
Dept: GASTROENTEROLOGY | Age: 70
End: 2020-11-18
Payer: COMMERCIAL

## 2020-11-18 VITALS
SYSTOLIC BLOOD PRESSURE: 116 MMHG | HEART RATE: 72 BPM | HEIGHT: 69 IN | TEMPERATURE: 97.1 F | DIASTOLIC BLOOD PRESSURE: 65 MMHG | OXYGEN SATURATION: 97 % | BODY MASS INDEX: 35.31 KG/M2 | WEIGHT: 238.4 LBS

## 2020-11-18 PROCEDURE — 99213 OFFICE O/P EST LOW 20 MIN: CPT | Performed by: NURSE PRACTITIONER

## 2020-11-18 ASSESSMENT — ENCOUNTER SYMPTOMS
EYE REDNESS: 0
RESPIRATORY NEGATIVE: 1
VOMITING: 0
BACK PAIN: 1
ABDOMINAL PAIN: 1
NAUSEA: 0
ALLERGIC/IMMUNOLOGIC NEGATIVE: 1
ANAL BLEEDING: 0
EYE PAIN: 0
TROUBLE SWALLOWING: 0
SHORTNESS OF BREATH: 0
CHEST TIGHTNESS: 0
SINUS PRESSURE: 0
WHEEZING: 0
DIARRHEA: 1
BLOOD IN STOOL: 0
CONSTIPATION: 0
ABDOMINAL DISTENTION: 1
SINUS PAIN: 0
RECTAL PAIN: 0

## 2020-11-18 NOTE — PROGRESS NOTES
GI CLINIC FOLLOW UP    INTERVAL HISTORY:   No referring provider defined for this encounter. Chief Complaint   Patient presents with    Follow-up     Patient is her today to f/u on EGD     HISTORY OF PRESENT ILLNESS:     Patient being seen for follow-up EGD for abnormal gastric mucosa on CT. EGD revealed prominent gastric folds in the upper part of the stomach towards the fundus. Biopsies revealed fundic gland polyp. Also noted to have inflammation of the antrum. No  H. Pylori. Has multiple gastric polyps, biopsies obtained revealed fundic gland polyp with low-grade dysplasia. Has duodenitits. No evidence of Celiac's. At present patient denies heartburns, dyspeptic symptoms. Takes PPI chronically the past 5+ years. Has alternating constipation with diarrhea. Has associated bloating, cramping. No melena, hematochezia. Diarrhea workup in the past negative. No weight loss, fevers, chills. Has good appetite. No dysphagia, odynophagia. No known food allergies. No NSAIDs  Takes Tylenol as needed for pain. Past Medical,Family, and Social History reviewed and does contribute to the patient presentingcondition. Patient's PMH/PSH,SH,PSYCH Hx, MEDs, ALLERGIES, and ROS were all reviewed and updated in the appropriate sections.     PAST MEDICAL HISTORY:  Past Medical History:   Diagnosis Date    Anxiety     Hyperlipidemia     Hypertension        Past Surgical History:   Procedure Laterality Date    APPENDECTOMY      BACK SURGERY      COLONOSCOPY  3/7/2012    tubular adenoma, hyperplastic polyp    COLONOSCOPY  05/28/2019    JOINT REPLACEMENT      hip    OTHER SURGICAL HISTORY  08/29/2016    Spinal stimulator    UPPER GASTROINTESTINAL ENDOSCOPY  05/28/2019    UPPER GASTROINTESTINAL ENDOSCOPY  10/15/2020       CURRENT MEDICATIONS:    Current Outpatient Medications:     pregabalin (LYRICA) 50 MG capsule, TAKE 1 CAPSULE BY MOUTH THREE TIMES A DAY , Disp: 90 capsule, Rfl: 3    tretinoin microspheres (RETIN-A MICRO) 0.04 % gel, Apply topically nightly., Disp: 1 Tube, Rfl: 2    citalopram (CELEXA) 10 MG tablet, TAKE 1 TABLET BY MOUTH ONE TIME A DAY , Disp: 30 tablet, Rfl: 5    OnabotulinumtoxinA (BOTOX IJ), Inject as directed Patient is taking for migraines, Disp: , Rfl:     pilocarpine (SALAGEN) 5 MG tablet, TAKE 1 TABLET BY MOUTH THREE TIMES A DAY, Disp: 90 tablet, Rfl: 3    atorvastatin (LIPITOR) 10 MG tablet, TAKE 1 TABLET BY MOUTH ONE TIME A DAY, Disp: 90 tablet, Rfl: 3    calcium carbonate (OSCAL) 500 MG TABS tablet, Take 500 mg by mouth daily, Disp: , Rfl:     magnesium 30 MG tablet, Take 30 mg by mouth 2 times daily, Disp: , Rfl:     allopurinol (ZYLOPRIM) 300 MG tablet, TAKE 1 TABLET BY MOUTH ONE TIME A DAY, Disp: 90 tablet, Rfl: 3    traZODone (DESYREL) 50 MG tablet, Take 1 tablet by mouth nightly, Disp: 30 tablet, Rfl: 2    omeprazole (PRILOSEC) 20 MG delayed release capsule, TAKE 1 CAPSULE BY MOUTH ONE TIME A DAY., Disp: 90 capsule, Rfl: 3    topiramate (TOPAMAX) 50 MG tablet, Take 1 tablet by mouth 2 times daily, Disp: 60 tablet, Rfl: 5    clopidogrel (PLAVIX) 75 MG tablet, Take 1 tablet by mouth daily, Disp: 90 tablet, Rfl: 3    carvedilol (COREG) 6.25 MG tablet, Take 1 tablet by mouth daily, Disp: 30 tablet, Rfl: 2    Galcanezumab-gnlm (EMGALITY) 120 MG/ML SOAJ, Inject into the skin, Disp: , Rfl:     lisinopril (PRINIVIL;ZESTRIL) 10 MG tablet, TAKE 1 TABLET BY MOUTH ONE TIME A DAY, Disp: 30 tablet, Rfl: 5    Multiple Vitamins-Minerals (OCUVITE PRESERVISION PO), Take 1 tablet by mouth daily, Disp: , Rfl:     Acetaminophen (TYLENOL EXTRA STRENGTH PO), Take by mouth Indications: prn, Disp: , Rfl:     POTASSIUM GLUCONATE PO, Take by mouth daily, Disp: , Rfl:     fluorouracil (EFUDEX) 5 % cream, Apply topically 2 times daily for not more than 4 weeks (Patient not taking: Reported on 11/18/2020), Disp: 40 g, Rfl: 1    ALLERGIES:   Allergies   Allergen 12-point review of systemswas obtained and pertinent positives and negatives were enumerated above in the history of present illness. All other reviewed systems / symptoms were negative. Review of Systems   Constitutional: Positive for appetite change (decreased) and fatigue. Negative for unexpected weight change (decreased). HENT: Negative for dental problem, sinus pressure, sinus pain and trouble swallowing. Eyes: Positive for visual disturbance (glasses). Negative for pain and redness. Respiratory: Negative. Negative for chest tightness, shortness of breath and wheezing. Cardiovascular: Negative. Negative for chest pain, palpitations and leg swelling. Gastrointestinal: Positive for abdominal distention, abdominal pain (RUQ) and diarrhea. Negative for anal bleeding, blood in stool, constipation, nausea, rectal pain and vomiting. Endocrine: Negative. Negative for cold intolerance and heat intolerance. Genitourinary: Positive for frequency and urgency. Negative for difficulty urinating. Musculoskeletal: Positive for arthralgias, back pain and neck pain. Skin: Negative. Allergic/Immunologic: Negative. Negative for environmental allergies and food allergies. Neurological: Positive for dizziness (improved), light-headedness and headaches. Negative for weakness. Hematological: Bruises/bleeds easily. Psychiatric/Behavioral: Positive for sleep disturbance. Negative for agitation. The patient is nervous/anxious. PHYSICAL EXAMINATION: Vital signs reviewed per the nursing documentation. /65   Pulse 72   Temp 97.1 °F (36.2 °C)   Ht 5' 9\" (1.753 m)   Wt 238 lb 6.4 oz (108.1 kg)   SpO2 97%   BMI 35.21 kg/m²   Body mass index is 35.21 kg/m². Physical Exam  Constitutional:       Appearance: Normal appearance. Eyes:      General: No scleral icterus. Pupils: Pupils are equal, round, and reactive to light.    Cardiovascular:      Rate and Rhythm: Normal rate and regular rhythm. Heart sounds: Normal heart sounds. Pulmonary:      Effort: Pulmonary effort is normal.      Breath sounds: Normal breath sounds. Abdominal:      General: Bowel sounds are normal. There is no distension. Palpations: Abdomen is soft. There is no mass. Tenderness: There is no abdominal tenderness. There is no guarding. Skin:     General: Skin is warm and dry. Coloration: Skin is not jaundiced. Neurological:      Mental Status: He is alert and oriented to person, place, and time. Mental status is at baseline. LABORATORY DATA: Reviewed  Lab Results   Component Value Date    WBC 5.5 03/07/2012    HGB 15.8 03/07/2012    HCT 44.8 03/07/2012    MCV 92.1 03/07/2012     03/07/2012     10/23/2019    K 4.2 10/23/2019     10/23/2019    CO2 27 10/23/2019    BUN 12 10/23/2019    CREATININE 0.66 10/23/2019         Lab Results   Component Value Date    RBC 4.86 03/07/2012    HGB 15.8 03/07/2012    MCV 92.1 03/07/2012    MCH 32.5 03/07/2012    MCHC 35.3 03/07/2012    RDW 12.9 03/07/2012    MPV 7.4 03/07/2012    BASOPCT 3 (H) 03/07/2012    LYMPHSABS 1.10 03/07/2012    MONOSABS 0.40 03/07/2012    NEUTROABS 3.50 03/07/2012    EOSABS 0.30 03/07/2012    BASOSABS 0.20 03/07/2012         DIAGNOSTIC TESTING:     No results found. IMPRESSION:      Diagnosis Orders   1. Fundic gland polyps of stomach, benign  EGD    Gastrin   2. Irritable bowel syndrome with both constipation and diarrhea       EGD reviewed with patient and wife in detail. Appears to have multiple fundic gland polyps. One notable for low-grade dysplasia. Will review with Pathologist.    Will check gastrin level  Patient has been on PPI therapy for 5+ years    Will need repeat EGD in March 2021. Pt was advised in detail about some life style and dietary modifications. He was advised about avoidance of caffeine, nicotine and chocolate.  Pt was also told to stay away from any kind of fast foods, soda pops. He was also advised to avoid lots of spices, grease and fried food etc.     Instructions were also given about trying to arrange the timing, quality and quantity of food. Instructions were given about using ample amount of fiber including dietary and supplemental fiber either metamucil, bennafiber or citrucell etc.  Pt was advised about drinking ample amount of water without any colors or chemicals. Stress was given about regular exercise. Pt has verbalized understanding and agreement to these modifications. Thank you for allowing me to participate in the care of Mr. Elihu Eisenmenger. For any further questions please do not hesitate to contact me. I have reviewed and agree with the ROS entered by the MA/JOSÉ MIGUELN.      HARINDER Kaur    [facility]  Office #: [phone]

## 2020-11-24 RX ORDER — TRAZODONE HYDROCHLORIDE 50 MG/1
50 TABLET ORAL NIGHTLY
Qty: 30 TABLET | Refills: 5 | Status: SHIPPED | OUTPATIENT
Start: 2020-11-24 | End: 2021-06-10 | Stop reason: SDUPTHER

## 2020-11-24 NOTE — TELEPHONE ENCOUNTER
INCOMING FAX:    LOV 11/06/20-  [facility] [address] [place] [address],[address]     If needed see request scanned in media

## 2020-12-02 LAB — GASTRIN: NORMAL

## 2020-12-04 ENCOUNTER — HOSPITAL ENCOUNTER (OUTPATIENT)
Age: 70
Setting detail: SPECIMEN
Discharge: HOME | End: 2020-12-04
Payer: COMMERCIAL

## 2020-12-04 ENCOUNTER — OFFICE VISIT (OUTPATIENT)
Dept: PRIMARY CARE CLINIC | Age: 70
End: 2020-12-04

## 2020-12-04 VITALS
WEIGHT: 238 LBS | HEART RATE: 89 BPM | HEIGHT: 69 IN | OXYGEN SATURATION: 97 % | SYSTOLIC BLOOD PRESSURE: 128 MMHG | BODY MASS INDEX: 35.25 KG/M2 | DIASTOLIC BLOOD PRESSURE: 78 MMHG | TEMPERATURE: 97.3 F

## 2020-12-04 PROCEDURE — 17000 DESTRUCT PREMALG LESION: CPT | Performed by: PHYSICIAN ASSISTANT

## 2020-12-04 PROCEDURE — 11102 TANGNTL BX SKIN SINGLE LES: CPT | Performed by: PHYSICIAN ASSISTANT

## 2020-12-04 NOTE — PROGRESS NOTES
Skin Biopsy Procedure Note  12/4/2020  Emperatriz Whitehead  1950    /78   Pulse 89   Temp 97.3 °F (36.3 °C)   Ht 5' 9\" (1.753 m)   Wt 238 lb (108 kg)   SpO2 97%   BMI 35.15 kg/m²   VITALS REVIEWED    Allergies   Allergen Reactions    Cupric Oxide     Chrome Alum     Cobalt     Copper-Containing Compounds     Nickel        Chief Complaint   Patient presents with    1 Month Follow-Up     re check SKs on face and ears        Lesion:  · 1. Right ear      Indication for Biopsy 1: bleeding, itchy rough lesion      Procedure: The lesion(s) was cleansed with Alcohol.  2% lidocaine without epinephrine was used for local anaesthesia. Two pieces were taken each appx 4 mm  shave was used to obtain a specimen. The specimen(s) were    preserved and sent for pathological examination. The biopsy site(s) were  cleansed and hemostasis using ACl and a pressure dressing(s) was achieved with good results. The patient was instructed on wound care. No complications occurred and the patient tolerated the procedure well. Diagnosis Orders   1. Actinic keratosis  IN DESTRUC PREMALIGNANT, FIRST LESION   2. Neoplasm of uncertain behavior of skin  Derm biopsy       Follow Up: Wound care discussed. Keep well moisturized. Watch for signs of infection which would include:  Redness, swelling, fever. If signs appear, please return to office. Return in about 6 months (around 6/4/2021) for Friday for AK recheck.        Electronically signed by Mounika Yuen PA-C on 12/4/2020 at 9:47 AM

## 2020-12-04 NOTE — PROGRESS NOTES
[facility]inic Keratoses destruction procedure note:  12/4/2020  Toney Goodell  1950    /78   Pulse 89   Temp 97.3 °F (36.3 °C)   Ht 5' 9\" (1.753 m)   Wt 238 lb (108 kg)   SpO2 97%   BMI 35.15 kg/m²   ALL VITALS REVIEWED    Allergies   Allergen Reactions    Cupric Oxide     Chrome Alum     Cobalt     Copper-Containing Compounds     Nickel        Chief Complaint   Patient presents with    1 Month Follow-Up     re check SKs on face and ears      Written consent was obtained. Lesion(s) cleansed with Alcohol. 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 Numberlesions treated: 1    Physical Exam    Patient tolerated procedure well. Diagnosis Orders   1. Actinic keratosis     2. Neoplasm of uncertain behavior of skin         Follow Up: Wound care discussed. Keep well moisturized. Watch for signs of infection which would include:  Redness, swelling, fever. If signs appear, please return to office. No follow-ups on file.     Electronically signed by Bethany Ford PA-C on 12/4/2020 at 9:44 AM

## 2020-12-08 ENCOUNTER — OFFICE VISIT (OUTPATIENT)
Dept: PODIATRY | Age: 70
End: 2020-12-08
Payer: COMMERCIAL

## 2020-12-08 VITALS — BODY MASS INDEX: 35.25 KG/M2 | HEIGHT: 69 IN | TEMPERATURE: 97.9 F | WEIGHT: 238 LBS

## 2020-12-08 LAB — DERMATOLOGY PATHOLOGY REPORT: NORMAL

## 2020-12-08 PROCEDURE — 11721 DEBRIDE NAIL 6 OR MORE: CPT | Performed by: PODIATRIST

## 2020-12-10 NOTE — PROGRESS NOTES
[facility] [address] [address]  Dept: [phone]     PAIN PROGRESS NOTE  Date of patient's visit: 12/10/2020  Patient's Name:  Akua Fuchs YOB: 1950            Patient Care Team:  Wellington Gasca PA-C as PCP - General (Physician Assistant)  Wellington Gasca PA-C as PCP [facility]  Isaac Alonso MD as Consulting Physician (Gastroenterology)  Renita Oneil MD as Consulting Physician [facility] Medicine)        Chief Complaint   Patient presents with    Foot Pain       Subjective: This Akua Fuchs comes to clinic for foot and nail care. Pt currently has complaint of thickened, painful, elongated nails that he/she cannot manage by themselves. Pt. Relates pain to nails with shoe gear. Pt's primary care physician is Wellington Gasca PA-C last seen august 28 2020.      Past Medical History:   Diagnosis Date    Anxiety     Hyperlipidemia     Hypertension        Allergies   Allergen Reactions    Cupric Oxide     Chrome Alum     Cobalt     Copper-Containing Compounds     Nickel      Current Outpatient Medications on File Prior to Visit   Medication Sig Dispense Refill    traZODone (DESYREL) 50 MG tablet Take 1 tablet by mouth nightly 30 tablet 5    pregabalin (LYRICA) 50 MG capsule TAKE 1 CAPSULE BY MOUTH THREE TIMES A DAY  90 capsule 3    citalopram (CELEXA) 10 MG tablet TAKE 1 TABLET BY MOUTH ONE TIME A DAY  30 tablet 5    OnabotulinumtoxinA (BOTOX IJ) Inject as directed Patient is taking for migraines      fluorouracil (EFUDEX) 5 % cream Apply topically 2 times daily for not more than 4 weeks 40 g 1    pilocarpine (SALAGEN) 5 MG tablet TAKE 1 TABLET BY MOUTH THREE TIMES A DAY 90 tablet 3    atorvastatin (LIPITOR) 10 MG tablet TAKE 1 TABLET BY MOUTH ONE TIME A DAY 90 tablet 3    calcium carbonate (OSCAL) 500 MG TABS tablet Take 500 mg by mouth daily      magnesium 30 MG tablet Take 30 mg by mouth 2 times daily      allopurinol (ZYLOPRIM) 300 MG tablet TAKE 1 TABLET BY MOUTH ONE TIME A DAY 90 tablet 3    omeprazole (PRILOSEC) 20 MG delayed release capsule TAKE 1 CAPSULE BY MOUTH ONE TIME A DAY. 90 capsule 3    topiramate (TOPAMAX) 50 MG tablet Take 1 tablet by mouth 2 times daily 60 tablet 5    clopidogrel (PLAVIX) 75 MG tablet Take 1 tablet by mouth daily 90 tablet 3    carvedilol (COREG) 6.25 MG tablet Take 1 tablet by mouth daily 30 tablet 2    Galcanezumab-gnlm (EMGALITY) 120 MG/ML SOAJ Inject into the skin      lisinopril (PRINIVIL;ZESTRIL) 10 MG tablet TAKE 1 TABLET BY MOUTH ONE TIME A DAY 30 tablet 5    Multiple Vitamins-Minerals (OCUVITE PRESERVISION PO) Take 1 tablet by mouth daily      Acetaminophen (TYLENOL EXTRA STRENGTH PO) Take by mouth Indications: prn      POTASSIUM GLUCONATE PO Take by mouth daily       No current facility-administered medications on file prior to visit. Review of Systems. Review of Systems:   History obtained from chart review and the patient  General ROS: negative for - chills, fatigue, fever, night sweats or weight gain  Constitutional: Negative for chills, diaphoresis, fatigue, fever and unexpected weight change. Musculoskeletal: Positive for arthralgias, gait problem and joint swelling. Neurological ROS: negative for - behavioral changes, confusion, headaches or seizures. Negative for weakness and numbness. Dermatological ROS: negative for - mole changes, rash  Cardiovascular: Negative for leg swelling. Gastrointestinal: Negative for constipation, diarrhea, nausea and vomiting. Objective:  Dermatologic Exam:  Skin lesion/ulceration Absent . Skin No rashes or nodules noted. .   Skin is thin, with flaky sloughing skin as well as decreased hair growth to the lower leg  Small red hemosiderin deposits seen dorsal foot   Musculoskeletal:     1st MPJ ROM decreased, Bilateral.  Muscle strength 5/5, Bilateral.  Pain present upon palpation of toenails 1-5, Bilateral. decreased medial longitudinal arch, Bilateral.  Ankle ROM decreased,Bilateral.    Dorsally contracted digits present digits 2, Bilateral.     Vascular: DP pulses 1/4 bilateral.  PT pulses 0/4 bilateral.   CFT <5 seconds, Bilateral.  Hair growth absent to the level of the digits, Bilateral.  Edema present, Bilateral.  Varicosities absent, Bilateral. Erythema absent, Bilateral    Neurological: Sensation diminshed to light touch to level of digits, Bilateral.  Protective sensation intact 6/10 sites via 5.07/10g [place] Monofilament, Bilateral.  negative Tinel's, Bilateral.  negative Valleix sign, Bilateral.      Integument: Warm, dry, supple, Bilateral.  Open lesion absent, Bilateral.  Interdigital maceration absent to web spaces 4, Bilateral.  Nails 1-5 left and 1-5 right thickened > 3.0 mm, dystrophic and crumbly, discolored with subungual debris. Fissures absent, Bilateral.   General: AAO x 3 in NAD.     Derm  Toenail Description  Sites of Onychomycosis Involvement (Check affected area)  [x] [x] [x] [x] [x] [x] [x] [x] [x] [x]  5 4 3 2 1 1 2 3 4 5                          Right                                        Left    Thickness  [x] [x] [x] [x] [x] [x] [x] [x] [x] [x]  5 4 3 2 1 1 2 3 4 5                         Right                                        Left    Dystrophic Changes   [x] [x] [x] [x] [x] [x] [x] [x] [x] [x]  5 4 3 2 1 1 2 3 4 5                         Right                                        Left    Color  [x] [x] [x] [x] [x] [x] [x] [x] [x] [x]  5 4 3 2 1 1 2 3 4 5                          Right                                        Left    Incurvation/Ingrowin   [] [] [] [] [] [] [] [] [] []  5 4 3 2 1 1 2 3 4 5                         Right                                        Left    Inflammation/Pain   [x] [x] [x] [x] [x] [x] [x] [x] [x] [x]  5 4 3  2 1 1 2 3 4 5                         Right Left       Nails are painful 1-10 with direct palpation. Q7   []Yes  []No                Q8   [x]Yes  []No                     Q9   []Yes    []No  Assessment:  68 y.o. male with:    Diagnosis Orders   1. Dermatophytosis of nail  75391 - SD DEBRIDEMENT OF NAILS, 6 OR MORE   2. Ingrown nail  55430 - SD DEBRIDEMENT OF NAILS, 6 OR MORE   3. Bilateral lower extremity pain  44270 - SD DEBRIDEMENT OF NAILS, 6 OR MORE   4. PVD (peripheral vascular disease) ([facility] [address]  13972 - SD DEBRIDEMENT OF NAILS, 6 OR MORE         Plan:   Pt was evaluated and examined. Patient was given personalized discharge instructions. Nails 1-10 were debrided in length and thickness sharply with a nail nipper and  without incident. Pt will follow up in 9 weeks or sooner if any problems arise. Diagnosis was discussed with the pt and all of their questions were answered in detail. Proper foot hygiene and care was discussed with the pt. Patient to check feet daily and contact the office with any questions/problems/concerns. Other comorbidity noted and will be managed by PCP. Pain waiver discussed with patient and confirmed.    12/8/2020      Electronically signed by Yoav Kimball DPM on 12/10/2020 at 1:47 PM  12/8/2020

## 2020-12-28 RX ORDER — PILOCARPINE HYDROCHLORIDE 5 MG/1
TABLET, FILM COATED ORAL
Qty: 90 TABLET | Refills: 0 | Status: SHIPPED | OUTPATIENT
Start: 2020-12-28 | End: 2021-02-15

## 2020-12-31 RX ORDER — CLOPIDOGREL BISULFATE 75 MG/1
75 TABLET ORAL DAILY
Qty: 90 TABLET | Refills: 3 | Status: SHIPPED | OUTPATIENT
Start: 2020-12-31 | End: 2021-12-06

## 2020-12-31 NOTE — TELEPHONE ENCOUNTER
INCOMING FAX:    LOV 12/04/20[facility]ud[facility] in [place]     If needed see request scanned in media

## 2021-01-05 ENCOUNTER — TELEPHONE (OUTPATIENT)
Dept: GASTROENTEROLOGY | Age: 71
End: 2021-01-05

## 2021-01-05 DIAGNOSIS — R10.30 LOWER ABDOMINAL PAIN: Primary | ICD-10-CM

## 2021-01-05 DIAGNOSIS — R10.11 ABDOMINAL PAIN, RIGHT UPPER QUADRANT: Primary | ICD-10-CM

## 2021-01-05 NOTE — TELEPHONE ENCOUNTER
Patient's wife called and was inquiring about a test that was ordered for the patient. Writer looked into the patient's chart and seen that a CT ABD/Pelvis was ordered. Writer told the caller that they are the ones that will schedule the appointment to get it done. Caller did not know that they were responsible for that. Caller verbalized understanding and stated she will call back to get the number for Central Scheduling.

## 2021-01-06 NOTE — TELEPHONE ENCOUNTER
Patient's wife called stating that the CT scan that was ordered by Codi Mayberry needs to be faxed to [facility] due to insurance restrictions. She also asked if the expected date could be changed to today because according to centralized scheduling, they would not be able to schedule the test sooner than that.  Please fax updated order to [facility] and inform patient's wife when it was completed, so she can schedule the test.

## 2021-01-07 NOTE — TELEPHONE ENCOUNTER
Pts wife called and states pt is experiencing bloating,diarrhea,right abd pain. Pt is asking if there is anything we can give or suggest he can use to help him.  Please contact

## 2021-01-08 RX ORDER — METRONIDAZOLE 250 MG/1
250 TABLET ORAL 3 TIMES DAILY
Qty: 30 TABLET | Refills: 0 | Status: SHIPPED | OUTPATIENT
Start: 2021-01-08 | End: 2021-01-18

## 2021-01-08 RX ORDER — METRONIDAZOLE 250 MG/1
250 TABLET ORAL 3 TIMES DAILY
Qty: 30 TABLET | Refills: 0 | Status: SHIPPED | OUTPATIENT
Start: 2021-01-08 | End: 2021-01-08

## 2021-01-11 NOTE — TELEPHONE ENCOUNTER
Writer received call from patient's wife stating that [facility] has not received updated order. Informed wife that I would fax again to centralized scheduling at [phone].

## 2021-01-22 ENCOUNTER — TELEPHONE (OUTPATIENT)
Dept: GASTROENTEROLOGY | Age: 71
End: 2021-01-22

## 2021-01-22 ENCOUNTER — OFFICE VISIT (OUTPATIENT)
Dept: GASTROENTEROLOGY | Age: 71
End: 2021-01-22
Payer: COMMERCIAL

## 2021-01-22 VITALS
BODY MASS INDEX: 35.44 KG/M2 | WEIGHT: 239.3 LBS | SYSTOLIC BLOOD PRESSURE: 135 MMHG | TEMPERATURE: 96.9 F | DIASTOLIC BLOOD PRESSURE: 80 MMHG | HEART RATE: 67 BPM | HEIGHT: 69 IN | OXYGEN SATURATION: 96 %

## 2021-01-22 DIAGNOSIS — R19.7 DIARRHEA, UNSPECIFIED TYPE: ICD-10-CM

## 2021-01-22 DIAGNOSIS — R10.30 LOWER ABDOMINAL PAIN: Primary | ICD-10-CM

## 2021-01-22 DIAGNOSIS — K58.0 IRRITABLE BOWEL SYNDROME WITH DIARRHEA: ICD-10-CM

## 2021-01-22 DIAGNOSIS — D13.1 FUNDIC GLAND POLYPS OF STOMACH, BENIGN: ICD-10-CM

## 2021-01-22 PROCEDURE — 99213 OFFICE O/P EST LOW 20 MIN: CPT | Performed by: NURSE PRACTITIONER

## 2021-01-22 RX ORDER — DICYCLOMINE HYDROCHLORIDE 10 MG/1
10 CAPSULE ORAL 4 TIMES DAILY
Qty: 120 CAPSULE | Refills: 3 | Status: SHIPPED | OUTPATIENT
Start: 2021-01-22 | End: 2022-01-27

## 2021-01-22 ASSESSMENT — ENCOUNTER SYMPTOMS
RECTAL PAIN: 0
ABDOMINAL PAIN: 1
CONSTIPATION: 0
CHEST TIGHTNESS: 0
ABDOMINAL DISTENTION: 1
SINUS PRESSURE: 0
ANAL BLEEDING: 0
SHORTNESS OF BREATH: 0
WHEEZING: 0
DIARRHEA: 1
BACK PAIN: 1
ALLERGIC/IMMUNOLOGIC NEGATIVE: 1
NAUSEA: 0
VOMITING: 0
EYE REDNESS: 0
BLOOD IN STOOL: 0
EYE PAIN: 0
SINUS PAIN: 0
TROUBLE SWALLOWING: 0
RESPIRATORY NEGATIVE: 1

## 2021-01-22 NOTE — PROGRESS NOTES
GI CLINIC FOLLOW UP    INTERVAL HISTORY:   Yanelis Mejia, [address],  [address]    Chief Complaint   Patient presents with    Follow-up     Patient is here today to f/u on labs       HISTORY OF PRESENT ILLNESS:     Patient being seen for follow-up labs, CT abdomen/pelvis. Patient has been having, not cramping, bloating, diarrhea. Patient had CT abdomen and pelvis that revealed diverticulosis without diverticulitis. Also incidentally found to have left renal lesion measuring 1.3 cm with indeterminate features. Patient was treated for SIBO with antibiotics. He reports that he was doing well and has abdominal bloating, diarrhea, pain improved. As of a few days ago the intermittent pain, bloating has returned. Predominantly had diarrhea, on average twice daily. No melena, hematochezia. Stool studies in the past negative. Has hx of cholecystectomy. Patient also had that EGD revealed prominent gastric folds in the upper part of the stomach towards the fundus. Biopsies revealed fundic gland polyp. Also noted to have inflammation of the antrum. No  H. Pylori. Has multiple gastric polyps, biopsies obtained revealed fundic gland polyp with low-grade dysplasia. Has duodenitits. No evidence of Celiac's.     At present patient denies heartburns, dyspeptic symptoms. Takes PPI chronically the past 5+ years. Past Medical,Family, and Social History reviewed and does contribute to the patient presentingcondition. Patient's PMH/PSH,SH,PSYCH Hx, MEDs, ALLERGIES, and ROS were all reviewed and updated in the appropriate sections.     PAST MEDICAL HISTORY:  Past Medical History:   Diagnosis Date    Anxiety     Hyperlipidemia     Hypertension        Past Surgical History:   Procedure Laterality Date    APPENDECTOMY      BACK SURGERY      COLONOSCOPY  3/7/2012    tubular adenoma, hyperplastic polyp    COLONOSCOPY  05/28/2019    JOINT REPLACEMENT Take by mouth daily, Disp: , Rfl:     fluorouracil (EFUDEX) 5 % cream, Apply topically 2 times daily for not more than 4 weeks (Patient not taking: Reported on 1/22/2021), Disp: 40 g, Rfl: 1    Multiple Vitamins-Minerals (OCUVITE PRESERVISION PO), Take 1 tablet by mouth daily, Disp: , Rfl:     ALLERGIES:   Allergies   Allergen Reactions    Cupric Oxide     Chrome Alum     Cobalt     Copper-Containing Compounds     Nickel        FAMILY HISTORY:       Problem Relation Age of Onset    Heart Disease Father     Hypertension Father     Hypertension Brother     Stroke Brother     Tuberculosis Brother          SOCIAL HISTORY:   Social History     Socioeconomic History    Marital status:      Spouse name: Not on file    Number of children: Not on file    Years of education: Not on file    Highest education level: Not on file   Occupational History    Not on file   Social Needs    Financial resource strain: Not on file    Food insecurity     Worry: Not on file     Inability: Not on file    Transportation needs     Medical: Not on file     Non-medical: Not on file   Tobacco Use    Smoking status: Former Smoker     Packs/day: 2.00     Years: 35.00     Pack years: 70.00     Types: Cigarettes     Quit date: 11/12/2005     Years since quitting: 15.2    Smokeless tobacco: Former User     Types: Chew   Substance and Sexual Activity    Alcohol use:  Yes     Alcohol/week: 35.0 - 42.0 standard drinks     Types: 35 - 42 Cans of beer per week     Comment: 6 pack daily    Drug use: No    Sexual activity: Not on file   Lifestyle    Physical activity     Days per week: Not on file     Minutes per session: Not on file    Stress: Not on file   Relationships    Social connections     Talks on phone: Not on file     Gets together: Not on file     Attends Judaism service: Not on file     Active member of club or organization: Not on file     Attends meetings of clubs or organizations: Not on file Relationship status: Not on file    Intimate partner violence     Fear of current or ex partner: Not on file     Emotionally abused: Not on file     Physically abused: Not on file     Forced sexual activity: Not on file   Other Topics Concern    Not on file   Social History Narrative    Not on file       REVIEW OF SYSTEMS: A 12-point review of systemswas obtained and pertinent positives and negatives were enumerated above in the history of present illness. All other reviewed systems / symptoms were negative. Review of Systems   Constitutional: Positive for appetite change (decreased) and fatigue. Negative for unexpected weight change (decreased). HENT: Negative for dental problem, sinus pressure, sinus pain and trouble swallowing. Eyes: Positive for visual disturbance (glasses). Negative for pain and redness. Respiratory: Negative. Negative for chest tightness, shortness of breath and wheezing. Cardiovascular: Negative. Negative for chest pain, palpitations and leg swelling. Gastrointestinal: Positive for abdominal distention, abdominal pain (RUQ) and diarrhea. Negative for anal bleeding, blood in stool, constipation, nausea, rectal pain and vomiting. Endocrine: Negative. Negative for cold intolerance and heat intolerance. Genitourinary: Positive for frequency and urgency. Negative for difficulty urinating. Musculoskeletal: Positive for arthralgias, back pain and neck pain. Skin: Negative. Allergic/Immunologic: Negative. Negative for environmental allergies and food allergies. Neurological: Positive for dizziness (improved), light-headedness and headaches. Negative for weakness. Hematological: Bruises/bleeds easily. Psychiatric/Behavioral: Positive for sleep disturbance. Negative for agitation. The patient is nervous/anxious. PHYSICAL EXAMINATION: Vital signs reviewed per the nursing documentation.      /80   Pulse 67   Temp 96.9 °F (36.1 °C)   Ht 5' 9\" (1.753 m) Wt 239 lb 4.8 oz (108.5 kg)   SpO2 96%   BMI 35.34 kg/m²   Body mass index is 35.34 kg/m². Physical Exam  Constitutional:       Appearance: Normal appearance. Eyes:      General: No scleral icterus. Pupils: Pupils are equal, round, and reactive to light. Cardiovascular:      Rate and Rhythm: Normal rate and regular rhythm. Heart sounds: Normal heart sounds. Pulmonary:      Effort: Pulmonary effort is normal.      Breath sounds: Normal breath sounds. Abdominal:      General: Bowel sounds are normal. There is no distension. Palpations: Abdomen is soft. There is no mass. Tenderness: There is abdominal tenderness. There is no guarding. Skin:     General: Skin is warm and dry. Coloration: Skin is not jaundiced. Neurological:      Mental Status: He is alert and oriented to person, place, and time. Mental status is at baseline. LABORATORY DATA: Reviewed  Lab Results   Component Value Date    WBC 5.5 03/07/2012    HGB 15.8 03/07/2012    HCT 44.8 03/07/2012    MCV 92.1 03/07/2012     03/07/2012     10/23/2019    K 4.2 10/23/2019     10/23/2019    CO2 27 10/23/2019    BUN 12 10/23/2019    CREATININE 0.66 10/23/2019         Lab Results   Component Value Date    RBC 4.86 03/07/2012    HGB 15.8 03/07/2012    MCV 92.1 03/07/2012    MCH 32.5 03/07/2012    MCHC 35.3 03/07/2012    RDW 12.9 03/07/2012    MPV 7.4 03/07/2012    BASOPCT 3 (H) 03/07/2012    LYMPHSABS 1.10 03/07/2012    MONOSABS 0.40 03/07/2012    NEUTROABS 3.50 03/07/2012    EOSABS 0.30 03/07/2012    BASOSABS 0.20 03/07/2012         DIAGNOSTIC TESTING:     No results found. IMPRESSION: Mr. Claude Raw is a 65 y.o. male with    Diagnosis Orders   1. Lower abdominal pain  dicyclomine (BENTYL) 10 MG capsule   2. Fundic gland polyps of stomach, benign     3. Diarrhea, unspecified type     4. Irritable bowel syndrome with diarrhea       Patient has probably IBS.   CT revealed diverticulosis without evidence of diverticulitis. Continues to have intermittent diarrhea. Stool studies negative. Colonoscopy 2018 unremarkable. We will trial him on Bentyl. Was treated with antibiotics for SIBO and this helped minimally. Discussed FODMAP diet. Patient also has a history of fundic gland polyps with low-grade dysplasia. Repeat EGD in March, continue PPI therapy. Thank you for allowing me to participate in the care of Mr. Brock Woodard. For any further questions please do not hesitate to contact me. I have reviewed and agree with the ROS entered by the MA/JOSÉ MIGUELN.          HARINDER Castorena    [facility]  Office #: [phone]

## 2021-01-22 NOTE — TELEPHONE ENCOUNTER
Per pt's wife, Dominic Perkins (PCP) is the prescriber for Plavix due to PVD. Faxed clearance request to Silvio Spears PA-C. Please call [facility] at [phone] with Plavix stop date.

## 2021-01-25 ENCOUNTER — TELEPHONE (OUTPATIENT)
Dept: PRIMARY CARE CLINIC | Age: 71
End: 2021-01-25

## 2021-01-25 DIAGNOSIS — F40.240 CLAUSTROPHOBIA: ICD-10-CM

## 2021-01-25 DIAGNOSIS — R10.30 LOWER ABDOMINAL PAIN: ICD-10-CM

## 2021-01-25 DIAGNOSIS — D49.519 RENAL NEOPLASM: Primary | ICD-10-CM

## 2021-01-25 NOTE — TELEPHONE ENCOUNTER
Pt notified with message below. Asking if you can send in something to keep him calm during the MRI? Quinn Salinas in [address] [place] [address] listed.

## 2021-01-25 NOTE — TELEPHONE ENCOUNTER
Patient notified of the message below -Verbalized Understanding   Referral faxed/scanned   Also have a sticky note to remind me to pull the results of the MRI that will be done at [address] [place] [address]

## 2021-01-25 NOTE — TELEPHONE ENCOUNTER
Looks like he needs an MRI for the renal lesion before he sees nephrology. If he thinks he can not do and MRI then we can do a CT w/w/o contrast.  I entered referral to Dr. Mg Freitas.

## 2021-01-25 NOTE — TELEPHONE ENCOUNTER
Pts wife called and states that her  had a CT done at [facility] and was told that he has a growth on his left kidney and will need to have his PCP put in a referral for a nephrologist.  Results scanned into labs please advise.

## 2021-01-26 ENCOUNTER — TELEPHONE (OUTPATIENT)
Dept: GASTROENTEROLOGY | Age: 71
End: 2021-01-26

## 2021-01-26 RX ORDER — DIAZEPAM 5 MG/1
TABLET ORAL
Qty: 5 TABLET | Refills: 0 | Status: SHIPPED | OUTPATIENT
Start: 2021-01-26 | End: 2021-02-15

## 2021-01-26 NOTE — TELEPHONE ENCOUNTER
Discussed in detail with patient. To only take Bentyl as needed for abdominal pain, cramps. Also, to take Tums, Beano as needed. Patient appreciated clarification. To keep follow-up appointment.   .Electronically signed by ABHISHEK Alicea NP on 1/26/2021 at 3:30 PM

## 2021-01-26 NOTE — TELEPHONE ENCOUNTER
Patient's wife called the office on 01/26/2021 and had a question about the new medication that the patient was put on. Patient was seen on 01/22/2021 by Cynthia Ramirez and put on a new medication dicyclomine (Bentyl) for his GERD. The concern the patient's wife has is taking this medication with his other medication. Patient's wife wants to know if it is ok to take the omeprazole, beanio, and Tums in between the QID dosage of bentyl. Writer told the patient's wife that she will consult with Cynthia Ramirez about her concerns and call her back after consulting with Cynthia Ramirez.     Writer routed message to Cynthia Ramirez

## 2021-02-02 DIAGNOSIS — R10.11 ABDOMINAL PAIN, RIGHT UPPER QUADRANT: ICD-10-CM

## 2021-02-03 RX ORDER — TOPIRAMATE 50 MG/1
50 TABLET, FILM COATED ORAL 2 TIMES DAILY
Qty: 60 TABLET | Refills: 5 | OUTPATIENT
Start: 2021-02-03

## 2021-02-03 RX ORDER — TOPIRAMATE 50 MG/1
50 TABLET, FILM COATED ORAL 2 TIMES DAILY
Qty: 60 TABLET | Refills: 5 | Status: SHIPPED | OUTPATIENT
Start: 2021-02-03 | End: 2021-06-21

## 2021-02-05 PROBLEM — D49.519 RENAL NEOPLASM: Status: ACTIVE | Noted: 2021-02-05

## 2021-02-05 PROBLEM — N18.1 CKD (CHRONIC KIDNEY DISEASE) STAGE 1, GFR 90 ML/MIN OR GREATER: Status: ACTIVE | Noted: 2021-02-05

## 2021-02-05 NOTE — TELEPHONE ENCOUNTER
Called and spoke to pt's wife, Toño Sinclair; advised that pt should stop Plavix 5 days before procedure. Toño Sinclair voices understanding.

## 2021-02-05 NOTE — TELEPHONE ENCOUNTER
Rec'd clearance from PCP, Abiola Castaneda- states no need to stop Plavix. Writer spoke with Dr Maynor Christianson; he would like Plavix stopped 5 days prior to procedure.

## 2021-02-09 PROBLEM — K58.0 IRRITABLE BOWEL SYNDROME WITH DIARRHEA: Status: ACTIVE | Noted: 2021-02-09

## 2021-02-12 NOTE — TELEPHONE ENCOUNTER
Pt's wife called stating she spoke to [facility] and they do not show pt is scheduled for procedure on 3/11/21. Writer called [facility] and spoke with Nolan Laird; she was able to confirm that pt is scheduled on 3/11/21 at 815am with Dr Cynthia Hardwick.

## 2021-02-15 DIAGNOSIS — F41.9 ANXIETY: ICD-10-CM

## 2021-02-15 PROBLEM — M75.120 FULL THICKNESS ROTATOR CUFF TEAR: Status: ACTIVE | Noted: 2021-02-15

## 2021-02-15 PROBLEM — M1A.9XX0 CHRONIC GOUTY ARTHRITIS: Status: ACTIVE | Noted: 2021-02-15

## 2021-02-15 PROBLEM — M19.91 PRIMARY OSTEOARTHRITIS: Status: ACTIVE | Noted: 2021-02-15

## 2021-02-15 PROBLEM — M1A.00X0 CHRONIC GOUTY ARTHRITIS: Status: ACTIVE | Noted: 2021-02-15

## 2021-02-15 PROBLEM — Z90.49 HISTORY OF CHOLECYSTECTOMY: Status: ACTIVE | Noted: 2021-02-15

## 2021-02-15 PROBLEM — M47.816 LUMBAR SPONDYLOSIS: Status: ACTIVE | Noted: 2021-02-15

## 2021-02-15 RX ORDER — LORAZEPAM 0.5 MG/1
TABLET ORAL
Qty: 30 TABLET | Refills: 0 | Status: SHIPPED | OUTPATIENT
Start: 2021-02-15 | End: 2021-06-04

## 2021-02-15 RX ORDER — PILOCARPINE HYDROCHLORIDE 5 MG/1
TABLET, FILM COATED ORAL
Qty: 90 TABLET | Refills: 0 | Status: SHIPPED | OUTPATIENT
Start: 2021-02-15 | End: 2021-03-22

## 2021-03-05 ENCOUNTER — NURSE TRIAGE (OUTPATIENT)
Dept: OTHER | Facility: CLINIC | Age: 71
End: 2021-03-05

## 2021-03-05 NOTE — TELEPHONE ENCOUNTER
Reason for Disposition   MILD pain (e.g., does not interfere with normal activities) and present > 7 days    Answer Assessment - Initial Assessment Questions  1. ONSET: \"When did the pain start? \"      Onset was about 2 weeks ago    2. LOCATION: \"Where is the pain located? \"      Right Thumb    3. PAIN: \"How bad is the pain? \" (Scale 1-10; or mild, moderate, severe)    - MILD (1-3): doesn't interfere with normal activities    - MODERATE (4-7): interferes with normal activities (e.g., work or school) or awakens from sleep    - SEVERE (8-10): excruciating pain, unable to use hand at all      Pain level when out of joint 6/10; no pain when the joint is seated    4. WORK OR EXERCISE: \"Has there been any recent work or exercise that involved this part of the body? \"      No    5. CAUSE: \"What do you think is causing the pain? \"      Dislocation    6. AGGRAVATING FACTORS: \"What makes the pain worse? \" (e.g., using computer)      Pulling it out of joint makes it hurts worse; re-seating it makes it better    7. OTHER SYMPTOMS: \"Do you have any other symptoms? \" (e.g., neck pain, swelling, rash, numbness, fever)      Just pain    8. PREGNANCY: \"Is there any chance you are pregnant? \" \"When was your last menstrual period? \"      No    Protocols used: HAND AND WRIST PAIN-ADULT-[place]    Call came from [facility] at the [facility]. See Triage above    Patient reports pain in his right thumb. He has a history of a tendon surgery on this thumb and recently the thumb has been dislocating with little encouragement. The patient can pop the thumb back in to place which relieves the pain. Patient denies any swelling or redness. In addition, the patient reports a small rash on his neck behind his left ear. The rash is red and itchy with small bumps that are not fluid filled - nor do they have a \"center\" to them. Encouraged patient to use hydrocortisone cream for itching. Provided care advice.   Transfer to [place] at the [facility] center for scheduling.

## 2021-03-09 ENCOUNTER — OFFICE VISIT (OUTPATIENT)
Dept: PRIMARY CARE CLINIC | Age: 71
End: 2021-03-09
Payer: COMMERCIAL

## 2021-03-09 VITALS
SYSTOLIC BLOOD PRESSURE: 132 MMHG | WEIGHT: 237.8 LBS | OXYGEN SATURATION: 98 % | TEMPERATURE: 96.4 F | DIASTOLIC BLOOD PRESSURE: 72 MMHG | BODY MASS INDEX: 35.22 KG/M2 | HEIGHT: 69 IN | HEART RATE: 81 BPM

## 2021-03-09 DIAGNOSIS — S63.114A CLOSED DISLOCATION OF METACARPOPHALANGEAL JOINT OF RIGHT THUMB, INITIAL ENCOUNTER: Primary | ICD-10-CM

## 2021-03-09 DIAGNOSIS — B35.0 TINEA CAPITIS: ICD-10-CM

## 2021-03-09 PROCEDURE — 99213 OFFICE O/P EST LOW 20 MIN: CPT | Performed by: NURSE PRACTITIONER

## 2021-03-09 RX ORDER — CLOTRIMAZOLE AND BETAMETHASONE DIPROPIONATE 10; .64 MG/G; MG/G
CREAM TOPICAL
Qty: 45 G | Refills: 1 | Status: SHIPPED | OUTPATIENT
Start: 2021-03-09 | End: 2021-08-03 | Stop reason: ALTCHOICE

## 2021-03-09 ASSESSMENT — PATIENT HEALTH QUESTIONNAIRE - PHQ9
SUM OF ALL RESPONSES TO PHQ QUESTIONS 1-9: 0
2. FEELING DOWN, DEPRESSED OR HOPELESS: 0
1. LITTLE INTEREST OR PLEASURE IN DOING THINGS: 0
SUM OF ALL RESPONSES TO PHQ9 QUESTIONS 1 & 2: 0

## 2021-03-09 ASSESSMENT — ENCOUNTER SYMPTOMS
SHORTNESS OF BREATH: 0
COUGH: 0
DIARRHEA: 0
BACK PAIN: 1
CONSTIPATION: 0

## 2021-03-09 NOTE — PATIENT INSTRUCTIONS
Dr. Roxana Franco.  [address], [facility] [address]   [phone]    Clotrimazole-betamethasone cream to rash 2x/day until resolved

## 2021-03-09 NOTE — PROGRESS NOTES
[address] [facility] PRIMARY [facility]  [address] [address]  [facility] [address]  Dept: [address] is a 65 y.o. male Established patient, who presents today for his medical conditions/complaintsas noted below. Chief Complaint   Patient presents with    Rash      Back of neck mainly on the right now moving to left ear     Hand Pain     Thumb dislocates from joint       HPI:     HPI    Reviewed prior notes None  Reviewed previous Labs 3/8/21    Rash on posterior neck X 2-3 weeks  He did have Botox shots in head about 2 weeks - he gets them every 3 months for headaches and they are helpful  It does itch, it burns sometimes, no drainage. It seems like it is getting larger    Right thumb is popping out - He can snap carpel joint in  Right thumb metacarpal joint is swollen and tender and seems to pop ou  He has tendon surgery on it a few years ago  Dr. Stephan Rodriguez did surgery and he has retired.     LDL Calculated (mg/dL)   Date Value   10/23/2019 83   09/19/2017 84   09/20/2016 86       (goal LDL is <100)   BUN (mg/dL)   Date Value   03/08/2021 11     BP Readings from Last 3 Encounters:   03/09/21 132/72   02/15/21 126/80   01/22/21 135/80          (goal 120/80)    Past Medical History:   Diagnosis Date    Anxiety     Duodenitis     Fundic gland polyps of stomach, benign     Hyperlipidemia     Hypertension       Past Surgical History:   Procedure Laterality Date    APPENDECTOMY      BACK SURGERY      CHOLECYSTECTOMY      COLONOSCOPY  3/7/2012    tubular adenoma, hyperplastic polyp    COLONOSCOPY  05/28/2019    JOINT REPLACEMENT      hip    OTHER SURGICAL HISTORY  08/29/2016    Spinal stimulator    SPINAL CORD STIMULATOR SURGERY  08/29/2016    UPPER GASTROINTESTINAL ENDOSCOPY  05/28/2019    UPPER GASTROINTESTINAL ENDOSCOPY  10/15/2020       Family History   Problem Relation Age of Onset    Heart Disease Father     Hypertension Father     Hypertension Brother  Stroke Brother     Tuberculosis Brother        Social History     Tobacco Use    Smoking status: Former Smoker     Packs/day: 2.00     Years: 35.00     Pack years: 70.00     Types: Cigarettes     Quit date: 11/12/2005     Years since quitting: 15.3    Smokeless tobacco: Former User     Types: Chew   Substance Use Topics    Alcohol use: Yes     Alcohol/week: 35.0 - 42.0 standard drinks     Types: 35 - 42 Cans of beer per week     Comment: 6 pack daily      Current Outpatient Medications   Medication Sig Dispense Refill    clotrimazole-betamethasone (LOTRISONE) 1-0.05 % cream Apply topically 2 times daily. 45 g 1    pilocarpine (SALAGEN) 5 MG tablet TAKE 1 TABLET BY MOUTH THREE TIMES A DAY  90 tablet 0    LORazepam (ATIVAN) 0.5 MG tablet TAKE 1 TABLET BY MOUTH EVERY SIX HOURS AS NEEDED FOR ANXIETY FOR up to 30 DAYS 30 tablet 0    topiramate (TOPAMAX) 50 MG tablet Take 1 tablet by mouth 2 times daily 60 tablet 5    dicyclomine (BENTYL) 10 MG capsule Take 1 capsule by mouth 4 times daily 120 capsule 3    clopidogrel (PLAVIX) 75 MG tablet Take 1 tablet by mouth daily 90 tablet 3    traZODone (DESYREL) 50 MG tablet Take 1 tablet by mouth nightly 30 tablet 5    citalopram (CELEXA) 10 MG tablet TAKE 1 TABLET BY MOUTH ONE TIME A DAY  30 tablet 5    OnabotulinumtoxinA (BOTOX IJ) Inject as directed Patient is taking for migraines      atorvastatin (LIPITOR) 10 MG tablet TAKE 1 TABLET BY MOUTH ONE TIME A DAY 90 tablet 3    calcium carbonate (OSCAL) 500 MG TABS tablet Take 500 mg by mouth daily      magnesium 30 MG tablet Take 30 mg by mouth 2 times daily      allopurinol (ZYLOPRIM) 300 MG tablet TAKE 1 TABLET BY MOUTH ONE TIME A DAY 90 tablet 3    omeprazole (PRILOSEC) 20 MG delayed release capsule TAKE 1 CAPSULE BY MOUTH ONE TIME A DAY.  90 capsule 3    carvedilol (COREG) 6.25 MG tablet Take 1 tablet by mouth daily 30 tablet 2    Galcanezumab-gnlm (EMGALITY) 120 MG/ML SOAJ Inject into the skin      lisinopril (PRINIVIL;ZESTRIL) 10 MG tablet TAKE 1 TABLET BY MOUTH ONE TIME A DAY 30 tablet 5    Multiple Vitamins-Minerals (OCUVITE PRESERVISION PO) Take 1 tablet by mouth daily      Acetaminophen (TYLENOL EXTRA STRENGTH PO) Take by mouth Indications: prn      POTASSIUM GLUCONATE PO Take by mouth daily      pregabalin (LYRICA) 50 MG capsule TAKE 1 CAPSULE BY MOUTH THREE TIMES A DAY  90 capsule 3     No current facility-administered medications for this visit. Allergies   Allergen Reactions    Cupric Oxide     Chrome Alum     Cobalt     Copper-Containing Compounds     Nickel        Health Maintenance   Topic Date Due    COVID-19 Vaccine (1 of 2) Never done    Shingles Vaccine (1 of 2) Never done   [employer] Visit (AWV)  Never done    Lipid screen  10/23/2020    Potassium monitoring  03/08/2022    Creatinine monitoring  03/08/2022    Colon cancer screen colonoscopy  05/28/2029    DTaP/Tdap/Td vaccine (2 - Td) 10/01/2030    Flu vaccine  Completed    Pneumococcal 65+ years Vaccine  Completed    AAA screen  Completed    Hepatitis C screen  Completed    Hepatitis A vaccine  Aged Out    Hepatitis B vaccine  Aged Out    Hib vaccine  Aged Out    Meningococcal (ACWY) vaccine  Aged Out       Subjective:      Review of Systems   Constitutional: Negative for chills and fever. Respiratory: Negative for cough and shortness of breath. Cardiovascular: Negative for chest pain and palpitations. Gastrointestinal: Negative for constipation and diarrhea. Musculoskeletal: Positive for arthralgias and back pain. Right hand pain   Skin: Positive for rash. Negative for wound. Psychiatric/Behavioral: Negative for sleep disturbance. Objective:     /72   Pulse 81   Temp 96.4 °F (35.8 °C)   Ht 5' 9\" (1.753 m)   Wt 237 lb 12.8 oz (107.9 kg)   SpO2 98%   BMI 35.12 kg/m²   Physical Exam  Vitals signs and nursing note reviewed.    Constitutional:       Appearance: Normal

## 2021-03-16 ENCOUNTER — OFFICE VISIT (OUTPATIENT)
Dept: UROLOGY | Age: 71
End: 2021-03-16
Payer: COMMERCIAL

## 2021-03-16 VITALS
SYSTOLIC BLOOD PRESSURE: 128 MMHG | DIASTOLIC BLOOD PRESSURE: 79 MMHG | WEIGHT: 237 LBS | BODY MASS INDEX: 35.1 KG/M2 | TEMPERATURE: 97.8 F | HEIGHT: 69 IN | HEART RATE: 73 BPM

## 2021-03-16 DIAGNOSIS — D49.519 RENAL NEOPLASM: Primary | ICD-10-CM

## 2021-03-16 DIAGNOSIS — Z12.5 PROSTATE CANCER SCREENING: ICD-10-CM

## 2021-03-16 PROCEDURE — 99204 OFFICE O/P NEW MOD 45 MIN: CPT | Performed by: UROLOGY

## 2021-03-16 ASSESSMENT — ENCOUNTER SYMPTOMS
SHORTNESS OF BREATH: 1
VOMITING: 0
EYE REDNESS: 0
COUGH: 0
NAUSEA: 0
BACK PAIN: 1
EYE PAIN: 0
WHEEZING: 0
ABDOMINAL PAIN: 0
CONSTIPATION: 0
DIARRHEA: 1

## 2021-03-16 NOTE — PROGRESS NOTES
Review of Systems   Constitutional: Positive for appetite change. Negative for chills and fatigue. Eyes: Negative for pain, redness and visual disturbance. Respiratory: Positive for shortness of breath. Negative for cough and wheezing. Cardiovascular: Negative for chest pain and leg swelling. Gastrointestinal: Positive for diarrhea. Negative for abdominal pain, constipation, nausea and vomiting. Genitourinary: Positive for frequency and urgency. Negative for difficulty urinating, dysuria, flank pain and hematuria. Musculoskeletal: Positive for back pain, joint swelling and myalgias. Skin: Positive for rash. Negative for wound. Neurological: Positive for dizziness, weakness and numbness. Hematological: Bruises/bleeds easily.

## 2021-03-16 NOTE — LETTER
[address] [address]  Dept: [phone]  Dept Fax: [phone]        3/16/21    Patient: Linad Alford  YOB: 1950    Dear Beto Kinsey PA-C,    I had the pleasure of seeing one of your patients, Aleshia Allen today in the office today. Below are the relevant portions of my assessment and plan of care. IMPRESSION:  1. Renal neoplasm    2. Prostate cancer screening        PLAN:  He is doing well. No  complaints. 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 PSA normal.   Needs PSA in 1 year. Prescriptions Ordered:  No orders of the defined types were placed in this encounter. Orders Placed:  Orders Placed This Encounter   Procedures    US RENAL LIMITED     Standing Status:   Future     Standing Expiration Date:   3/17/2022         Thank you for allowing me to participate in the care of this patient. I will keep you updated on this patient's follow up and I look forward to serving you and your patients again in the future.         Maik Feliz MD

## 2021-03-16 NOTE — PROGRESS NOTES
[address] [address]  Dept: [phone]  Dept Fax: [address] [facility] Urology Office Note - New patient    Patient:  Sam Lynch  YOB: 1950  Date: 3/16/2021    The patient is a 65 y.o. male who presents todayfor evaluation of the following problems:   Chief Complaint   Patient presents with    New Patient     ref by Clint Weber MD, Renal neoplasm, Screening PSA     Urinary Frequency     unable to provide urine specimen at this time. referred by Colten Woods PA-C.      HPI  He is here to evaluate a left renal lesion. He had an incidental lesion in the left kidney. No hematuria. He is a former smoker. No FHx of prostate cancer. PSA us normal at 1.95. (Patient's old records have been requested, reviewed and summarized in today's note.)    Summary of old records: N/A    Additional History: N/A    Procedures Today: N/A    Last several PSA's:  No results found for: PSA  Last total testosterone:  No results found for: TESTOSTERONE  Urinalysis today:  No results found for this visit on 03/16/21. AUA Symptom Score (3/16/2021):   INCOMPLETE EMPTYING: How often have you had the sensation of not emptying your bladder?: Less than 1 to 5 times  FREQUENCY: How often do you have to urinate less than every two hours?: Not at all  INTERMITTENCY: How often have you found you stopped and started again several times when you urinated?: Not at all  URGENCY: How often have you found it difficult to postpone urination?: Less than 1 to 5 times  WEAK STREAM: How often have you had a weak urinary stream?: Not at all  STRAINING: How often have you had to strain to start  urination?: Not at all  NOCTURIA: How many times did you typically get up at night to uriniate?: 1 Time  TOTAL I-PSS SCORE[de-identified] 3  How would you feel if you were to spend the rest of your life with your urinary condition?: Mostly Satisfied    Last BUN and creatinine:  Lab Results   Component Value Date    BUN 11 03/08/2021     Lab Results   Component Value Date    CREATININE 0.72 03/08/2021       Additional Lab/Culture results: none    Imaging Reviewed during this Office Visit: CT and MRI reviewed. Mass in lower pole shows no enhancement, on MRI, Bosniak 2.   (results were independently reviewed by physician and radiology report verified)    PAST MEDICAL, FAMILY AND SOCIAL HISTORY:  Past Medical History:   Diagnosis Date    Anxiety     Duodenitis     Fundic gland polyps of stomach, benign     Hyperlipidemia     Hypertension      Past Surgical History:   Procedure Laterality Date    APPENDECTOMY      BACK SURGERY      CHOLECYSTECTOMY      COLONOSCOPY  3/7/2012    tubular adenoma, hyperplastic polyp    COLONOSCOPY  05/28/2019    JOINT REPLACEMENT      hip    OTHER SURGICAL HISTORY  08/29/2016    Spinal stimulator    SPINAL CORD STIMULATOR SURGERY  08/29/2016    UPPER GASTROINTESTINAL ENDOSCOPY  05/28/2019    UPPER GASTROINTESTINAL ENDOSCOPY  10/15/2020     Family History   Problem Relation Age of Onset    Heart Disease Father     Hypertension Father     Hypertension Brother     Stroke Brother     Tuberculosis Brother      Outpatient Medications Marked as Taking for the 3/16/21 encounter (Office Visit) with Jesus Katz MD   Medication Sig Dispense Refill    clotrimazole-betamethasone (LOTRISONE) 1-0.05 % cream Apply topically 2 times daily.  45 g 1    vitamin D (ERGOCALCIFEROL) 1.25 MG (15246 UT) CAPS capsule Take 1 capsule by mouth once a week 24 capsule 0    pilocarpine (SALAGEN) 5 MG tablet TAKE 1 TABLET BY MOUTH THREE TIMES A DAY  90 tablet 0    LORazepam (ATIVAN) 0.5 MG tablet TAKE 1 TABLET BY MOUTH EVERY SIX HOURS AS NEEDED FOR ANXIETY FOR up to 30 DAYS 30 tablet 0    topiramate (TOPAMAX) 50 MG tablet Take 1 tablet by mouth 2 times daily 60 tablet 5    dicyclomine (BENTYL) 10 MG capsule Take 1 capsule by mouth 4 times daily 120 capsule 3    clopidogrel (PLAVIX) 75 MG tablet Take 1 tablet by mouth daily 90 tablet 3    traZODone (DESYREL) 50 MG tablet Take 1 tablet by mouth nightly 30 tablet 5    citalopram (CELEXA) 10 MG tablet TAKE 1 TABLET BY MOUTH ONE TIME A DAY  30 tablet 5    OnabotulinumtoxinA (BOTOX IJ) Inject as directed Patient is taking for migraines      atorvastatin (LIPITOR) 10 MG tablet TAKE 1 TABLET BY MOUTH ONE TIME A DAY 90 tablet 3    calcium carbonate (OSCAL) 500 MG TABS tablet Take 500 mg by mouth daily      magnesium 30 MG tablet Take 30 mg by mouth 2 times daily      allopurinol (ZYLOPRIM) 300 MG tablet TAKE 1 TABLET BY MOUTH ONE TIME A DAY 90 tablet 3    omeprazole (PRILOSEC) 20 MG delayed release capsule TAKE 1 CAPSULE BY MOUTH ONE TIME A DAY.  90 capsule 3    carvedilol (COREG) 6.25 MG tablet Take 1 tablet by mouth daily 30 tablet 2    Galcanezumab-gnlm (EMGALITY) 120 MG/ML SOAJ Inject into the skin      lisinopril (PRINIVIL;ZESTRIL) 10 MG tablet TAKE 1 TABLET BY MOUTH ONE TIME A DAY 30 tablet 5    Multiple Vitamins-Minerals (OCUVITE PRESERVISION PO) Take 1 tablet by mouth daily      Acetaminophen (TYLENOL EXTRA STRENGTH PO) Take by mouth Indications: prn      POTASSIUM GLUCONATE PO Take by mouth daily          Cupric oxide, Chrome alum, Cobalt, Copper-containing compounds, and Nickel  Social History     Tobacco Use   Smoking Status Former Smoker    Packs/day: 2.00    Years: 35.00    Pack years: 70.00    Types: Cigarettes    Quit date: 11/12/2005    Years since quitting: 15.3   Smokeless Tobacco Former User    Types: Chew      (If patient a smoker, smoking cessation counseling offered)   Social History     Substance and Sexual Activity   Alcohol Use Yes    Alcohol/week: 35.0 - 42.0 standard drinks    Types: 35 - 42 Cans of beer per week    Comment: 6 pack daily       REVIEW OF SYSTEMS:  Review of Systems    Physical Exam:    This a 65 y.o. male   Vitals:    03/16/21 0917   BP: 128/79   Pulse: 73   Temp: 97.8 °F (36.6 °C)     Body mass index is 35 kg/m². Physical Exam  Constitutional: Patient in no acute distress. Neuro: Alert and oriented to person, place and time. Psych: Mood normal, affect normal  Skin: No rash noted  Lungs:Respiratory effort is normal  Cardiovascular: Warm & Pink  Abdomen: Soft, non-tender, non-distendedwith no CVA,  No flank tenderness,  Orhepatosplenomegaly   Lymphatics: No palpable lymphadenopathy. Bladder non-tender and not distended. Musculoskeletal: Normal gait and station  Penis normal and circumcised  Urethral meatus normal  Scrotal exam normal  Testicles normal bilaterally  Epididymis normal bilaterally  No evidence of inguinal hernia      Assessment and Plan      1. Renal neoplasm    2. Prostate cancer screening           Plan:        He is doing well. No  complaints. 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 PSA normal.   Needs PSA in 1 year. Prescriptions Ordered:  No orders of the defined types were placed in this encounter. Orders Placed:  Orders Placed This Encounter   Procedures    US RENAL LIMITED     Standing Status:   Future     Standing Expiration Date:   3/17/2022             Luis Antonio Sam MD    Agree with the ROS entered by the MA.

## 2021-03-25 ENCOUNTER — OFFICE VISIT (OUTPATIENT)
Dept: PODIATRY | Age: 71
End: 2021-03-25
Payer: COMMERCIAL

## 2021-03-25 VITALS — WEIGHT: 237 LBS | BODY MASS INDEX: 35.1 KG/M2 | HEIGHT: 69 IN

## 2021-03-25 DIAGNOSIS — M79.604 BILATERAL LOWER EXTREMITY PAIN: ICD-10-CM

## 2021-03-25 DIAGNOSIS — B35.1 DERMATOPHYTOSIS OF NAIL: Primary | ICD-10-CM

## 2021-03-25 DIAGNOSIS — I73.9 PVD (PERIPHERAL VASCULAR DISEASE) (HCC): ICD-10-CM

## 2021-03-25 DIAGNOSIS — M79.605 BILATERAL LOWER EXTREMITY PAIN: ICD-10-CM

## 2021-03-25 PROCEDURE — 11721 DEBRIDE NAIL 6 OR MORE: CPT | Performed by: PODIATRIST

## 2021-04-01 NOTE — PROGRESS NOTES
[address] [facility] [address] [id number] [address]  Dept: [phone]     PAIN PROGRESS NOTE  Date of patient's visit: 4/1/2021  Patient's Name:  Pradip Goldstein YOB: 1950            Patient Care Team:  Chrissie Malik PA-C as PCP - General (Physician Assistant)  Chrissie Malik PA-C as PCP [facility]  Nazia Rincon MD as Consulting Physician (Gastroenterology)  Alyx Cancino MD as Consulting Physician [facility] Medicine)        Chief Complaint   Patient presents with    Nail Problem    Foot Pain    Peripheral Neuropathy       Subjective: This Pradip Goldstein comes to clinic for foot and nail care. Pt currently has complaint of thickened, painful, elongated nails that he/she cannot manage by themselves. Pt. Relates pain to nails with shoe gear. Pt's primary care physician is Chrissie Malik PA-C last seen august 28 2020. Past Medical History:   Diagnosis Date    Anxiety     Duodenitis     Fundic gland polyps of stomach, benign     Hyperlipidemia     Hypertension        Allergies   Allergen Reactions    Cupric Oxide     Chrome Alum     Cobalt     Copper-Containing Compounds     Nickel      Current Outpatient Medications on File Prior to Visit   Medication Sig Dispense Refill    allopurinol (ZYLOPRIM) 300 MG tablet TAKE 1 TABLET BY MOUTH ONE TIME A DAY  90 tablet 3    pilocarpine (SALAGEN) 5 MG tablet TAKE 1 TABLET BY MOUTH THREE TIMES A DAY  90 tablet 3    omeprazole (PRILOSEC) 20 MG delayed release capsule TAKE 1 CAPSULE BY MOUTH ONE TIME A DAY  90 capsule 1    citalopram (CELEXA) 10 MG tablet TAKE 1 TABLET BY MOUTH EVERY DAY 30 tablet 5    clotrimazole-betamethasone (LOTRISONE) 1-0.05 % cream Apply topically 2 times daily.  45 g 1    vitamin D (ERGOCALCIFEROL) 1.25 MG (98552 UT) CAPS capsule Take 1 capsule by mouth once a week 24 capsule 0    topiramate (TOPAMAX) 50 MG tablet Take 1 lesion/ulceration Absent . Skin No rashes or nodules noted. .   Skin is thin, with flaky sloughing skin as well as decreased hair growth to the lower leg  Small red hemosiderin deposits seen dorsal foot   Musculoskeletal:     1st MPJ ROM decreased, Bilateral.  Muscle strength 5/5, Bilateral.  Pain present upon palpation of toenails 1-5, Bilateral. decreased medial longitudinal arch, Bilateral.  Ankle ROM decreased,Bilateral.    Dorsally contracted digits present digits 2, Bilateral.     Vascular: DP pulses 1/4 bilateral.  PT pulses 0/4 bilateral.   CFT <5 seconds, Bilateral.  Hair growth absent to the level of the digits, Bilateral.  Edema present, Bilateral.  Varicosities absent, Bilateral. Erythema absent, Bilateral    Neurological: Sensation diminshed to light touch to level of digits, Bilateral.  Protective sensation intact 6/10 sites via 5.07/10g Moran-Ashlee Monofilament, Bilateral.  negative Tinel's, Bilateral.  negative Valleix sign, Bilateral.      Integument: Warm, dry, supple, Bilateral.  Open lesion absent, Bilateral.  Interdigital maceration absent to web spaces 4, Bilateral.  Nails 1-5 left and 1-5 right thickened > 3.0 mm, dystrophic and crumbly, discolored with subungual debris. Fissures absent, Bilateral.   General: AAO x 3 in NAD.     Derm  Toenail Description  Sites of Onychomycosis Involvement (Check affected area)  [x] [x] [x] [x] [x] [x] [x] [x] [x] [x]  5 4 3 2 1 1 2 3 4 5                          Right                                        Left    Thickness  [x] [x] [x] [x] [x] [x] [x] [x] [x] [x]  5 4 3 2 1 1 2 3 4 5                         Right                                        Left    Dystrophic Changes   [x] [x] [x] [x] [x] [x] [x] [x] [x] [x]  5 4 3 2 1 1 2 3 4 5                         Right                                        Left    Color  [x] [x] [x] [x] [x] [x] [x] [x] [x] [x]  5 4 3 2 1 1 2 3 4 5                          Right Left    Incurvation/Ingrowin   [] [] [] [] [] [] [] [] [] []  5 4 3 2 1 1 2 3 4 5                         Right                                        Left    Inflammation/Pain   [x] [x] [x] [x] [x] [x] [x] [x] [x] [x]  5 4 3  2 1 1 2 3 4 5                         Right                                        Left       Nails are painful 1-10 with direct palpation. Q7   []Yes  []No                Q8   [x]Yes  []No                     Q9   []Yes    []No  Assessment:  65 y.o. male with:    Diagnosis Orders   1. Dermatophytosis of nail  21274 - [place] DEBRIDEMENT OF NAILS, 6 OR MORE   2. Bilateral lower extremity pain  96133 - [place] DEBRIDEMENT OF NAILS, 6 OR MORE   3. PVD (peripheral vascular disease) ([facility] [address]  79440 - [place] DEBRIDEMENT OF NAILS, 6 OR MORE         Plan:   Pt was evaluated and examined. Patient was given personalized discharge instructions. Nails 1-10 were debrided in length and thickness sharply with a nail nipper and  without incident. Pt will follow up in 9 weeks or sooner if any problems arise. Diagnosis was discussed with the pt and all of their questions were answered in detail. Proper foot hygiene and care was discussed with the pt. Patient to check feet daily and contact the office with any questions/problems/concerns. Other comorbidity noted and will be managed by PCP. Pain waiver discussed with patient and confirmed.    3/25/2021      Electronically signed by Pasquael Wisdom DPM on 4/1/2021 at 3:28 PM  3/25/2021

## 2021-04-08 ENCOUNTER — OFFICE VISIT (OUTPATIENT)
Dept: GASTROENTEROLOGY | Age: 71
End: 2021-04-08
Payer: COMMERCIAL

## 2021-04-08 VITALS
HEART RATE: 81 BPM | SYSTOLIC BLOOD PRESSURE: 123 MMHG | WEIGHT: 232.7 LBS | DIASTOLIC BLOOD PRESSURE: 82 MMHG | BODY MASS INDEX: 34.47 KG/M2 | HEIGHT: 69 IN | TEMPERATURE: 97 F

## 2021-04-08 DIAGNOSIS — K52.9 CHRONIC DIARRHEA: Primary | ICD-10-CM

## 2021-04-08 PROCEDURE — 99214 OFFICE O/P EST MOD 30 MIN: CPT | Performed by: INTERNAL MEDICINE

## 2021-04-08 ASSESSMENT — ENCOUNTER SYMPTOMS
EYE PAIN: 0
BLOOD IN STOOL: 0
VOMITING: 0
WHEEZING: 0
SINUS PRESSURE: 0
RECTAL PAIN: 0
ALLERGIC/IMMUNOLOGIC NEGATIVE: 1
TROUBLE SWALLOWING: 0
ANAL BLEEDING: 0
CHEST TIGHTNESS: 0
NAUSEA: 0
BACK PAIN: 1
CONSTIPATION: 0
ABDOMINAL PAIN: 1
SINUS PAIN: 0
RESPIRATORY NEGATIVE: 1
DIARRHEA: 1
EYE REDNESS: 0
SHORTNESS OF BREATH: 0
ABDOMINAL DISTENTION: 1

## 2021-04-08 NOTE — PROGRESS NOTES
GI OFFICE FOLLOW UP    INTERVAL HISTORY:   No referring provider defined for this encounter. Chief Complaint   Patient presents with    Follow-up     Patient is here today to f/u on EGD 3/11/2021       1. Chronic diarrhea              HISTORY OF PRESENT ILLNESS: Ja Bee is a 65 y.o. male with a past history remarkable for   Patient seen along with his wife. Recently patient had EGD done and was found to have multiple polyps in the stomach. One of the polyp was removed and histology revealed focal low-grade dysplasia. Also was found to have prominent gastric folds, biopsies benign. Patient complains of chronic diarrhea. In 2020 his work-up was inconclusive. He was supposed to have 72-hour stool collection done and that was not done according to recommendations. Also other labs including tissue transglutaminase antibody pending. In the last 6 months he lost about 4 to 5 pounds. He also states that he has nocturnal bowel movements. Previous ago used to have significant constipation. No food allergies. No symptoms of lactose intolerance    Past Medical,Family, and Social History reviewed and does contribute to the patient presenting condition. Patient's PMH/PSH,SH,PSYCH Hx, MEDs, ALLERGIES, and ROS were all reviewed and updated in the appropriate sections.  Yes      PAST MEDICAL HISTORY:  Past Medical History:   Diagnosis Date    Anxiety     Duodenitis     Fundic gland polyps of stomach, benign     Hyperlipidemia     Hypertension        Past Surgical History:   Procedure Laterality Date    APPENDECTOMY      BACK SURGERY      CHOLECYSTECTOMY      COLONOSCOPY  3/7/2012    tubular adenoma, hyperplastic polyp    COLONOSCOPY  05/28/2019    JOINT REPLACEMENT      hip    OTHER SURGICAL HISTORY  08/29/2016    Spinal stimulator    SPINAL CORD STIMULATOR SURGERY 08/29/2016    UPPER GASTROINTESTINAL ENDOSCOPY  05/28/2019    UPPER GASTROINTESTINAL ENDOSCOPY  10/15/2020       CURRENT MEDICATIONS:    Current Outpatient Medications:     allopurinol (ZYLOPRIM) 300 MG tablet, TAKE 1 TABLET BY MOUTH ONE TIME A DAY , Disp: 90 tablet, Rfl: 3    pilocarpine (SALAGEN) 5 MG tablet, TAKE 1 TABLET BY MOUTH THREE TIMES A DAY , Disp: 90 tablet, Rfl: 3    omeprazole (PRILOSEC) 20 MG delayed release capsule, TAKE 1 CAPSULE BY MOUTH ONE TIME A DAY , Disp: 90 capsule, Rfl: 1    citalopram (CELEXA) 10 MG tablet, TAKE 1 TABLET BY MOUTH EVERY DAY, Disp: 30 tablet, Rfl: 5    clotrimazole-betamethasone (LOTRISONE) 1-0.05 % cream, Apply topically 2 times daily. , Disp: 45 g, Rfl: 1    vitamin D (ERGOCALCIFEROL) 1.25 MG (23827 UT) CAPS capsule, Take 1 capsule by mouth once a week, Disp: 24 capsule, Rfl: 0    topiramate (TOPAMAX) 50 MG tablet, Take 1 tablet by mouth 2 times daily, Disp: 60 tablet, Rfl: 5    dicyclomine (BENTYL) 10 MG capsule, Take 1 capsule by mouth 4 times daily, Disp: 120 capsule, Rfl: 3    clopidogrel (PLAVIX) 75 MG tablet, Take 1 tablet by mouth daily, Disp: 90 tablet, Rfl: 3    traZODone (DESYREL) 50 MG tablet, Take 1 tablet by mouth nightly, Disp: 30 tablet, Rfl: 5    OnabotulinumtoxinA (BOTOX IJ), Inject as directed Patient is taking for migraines, Disp: , Rfl:     atorvastatin (LIPITOR) 10 MG tablet, TAKE 1 TABLET BY MOUTH ONE TIME A DAY, Disp: 90 tablet, Rfl: 3    calcium carbonate (OSCAL) 500 MG TABS tablet, Take 500 mg by mouth daily, Disp: , Rfl:     magnesium 30 MG tablet, Take 30 mg by mouth 2 times daily, Disp: , Rfl:     carvedilol (COREG) 6.25 MG tablet, Take 1 tablet by mouth daily, Disp: 30 tablet, Rfl: 2    Galcanezumab-gnlm (EMGALITY) 120 MG/ML SOAJ, Inject into the skin, Disp: , Rfl:     lisinopril (PRINIVIL;ZESTRIL) 10 MG tablet, TAKE 1 TABLET BY MOUTH ONE TIME A DAY, Disp: 30 tablet, Rfl: 5    Multiple Vitamins-Minerals (OCUVITE PRESERVISION PO), Take 1 tablet by mouth daily, Disp: , Rfl:     Acetaminophen (TYLENOL EXTRA STRENGTH PO), Take by mouth Indications: prn, Disp: , Rfl:     POTASSIUM GLUCONATE PO, Take by mouth daily, Disp: , Rfl:     pregabalin (LYRICA) 50 MG capsule, TAKE 1 CAPSULE BY MOUTH THREE TIMES A DAY , Disp: 90 capsule, Rfl: 3    ALLERGIES:   Allergies   Allergen Reactions    Cupric Oxide     Chrome Alum     Cobalt     Copper-Containing Compounds     Nickel        FAMILY HISTORY:       Problem Relation Age of Onset    Heart Disease Father     Hypertension Father     Hypertension Brother     Stroke Brother     Tuberculosis Brother          SOCIAL HISTORY:   Social History     Socioeconomic History    Marital status:      Spouse name: Not on file    Number of children: Not on file    Years of education: Not on file    Highest education level: Not on file   Occupational History    Not on file   Social Needs    Financial resource strain: Not on file    Food insecurity     Worry: Not on file     Inability: Not on file    Transportation needs     Medical: Not on file     Non-medical: Not on file   Tobacco Use    Smoking status: Former Smoker     Packs/day: 2.00     Years: 35.00     Pack years: 70.00     Types: Cigarettes     Quit date: 11/12/2005     Years since quitting: 15.4    Smokeless tobacco: Former User     Types: Chew   Substance and Sexual Activity    Alcohol use:  Yes     Alcohol/week: 35.0 - 42.0 standard drinks     Types: 35 - 42 Cans of beer per week     Comment: 6 pack daily    Drug use: No    Sexual activity: Not on file   Lifestyle    Physical activity     Days per week: Not on file     Minutes per session: Not on file    Stress: Not on file   Relationships    Social connections     Talks on phone: Not on file     Gets together: Not on file     Attends [employer] service: Not on file     Active member of club or organization: Not on file     Attends meetings of clubs or organizations: Not on file     Relationship status: Not on file    Intimate partner violence     Fear of current or ex partner: Not on file     Emotionally abused: Not on file     Physically abused: Not on file     Forced sexual activity: Not on file   Other Topics Concern    Not on file   Social History Narrative    Not on file         REVIEW OF SYSTEMS:         Review of Systems   Constitutional: Positive for appetite change (decreased) and fatigue. Negative for unexpected weight change (decreased). HENT: Negative for dental problem, sinus pressure, sinus pain and trouble swallowing. Eyes: Positive for visual disturbance (glasses). Negative for pain and redness. Respiratory: Negative. Negative for chest tightness, shortness of breath and wheezing. Cardiovascular: Negative. Negative for chest pain, palpitations and leg swelling. Gastrointestinal: Positive for abdominal distention, abdominal pain (RUQ) and diarrhea. Negative for anal bleeding, blood in stool, constipation, nausea, rectal pain and vomiting. Endocrine: Negative. Negative for cold intolerance and heat intolerance. Genitourinary: Positive for frequency and urgency. Negative for difficulty urinating. Musculoskeletal: Positive for arthralgias, back pain and neck pain. Skin: Negative. Allergic/Immunologic: Negative. Negative for environmental allergies and food allergies. Neurological: Positive for dizziness (improved), light-headedness and headaches. Negative for weakness. Hematological: Bruises/bleeds easily. Psychiatric/Behavioral: Positive for sleep disturbance. Negative for agitation. The patient is nervous/anxious. PHYSICAL EXAMINATION:     Vital signs reviewed per the nursing documentation. /82   Pulse 81   Temp 97 °F (36.1 °C)   Ht 5' 9\" (1.753 m)   Wt 232 lb 11.2 oz (105.6 kg)   BMI 34.36 kg/m²   Body mass index is 34.36 kg/m². Physical Exam  Vitals signs reviewed.    Constitutional:       Appearance: Normal appearance. Comments: Moderately overweight patient. HENT:      Head: Normocephalic and atraumatic. Eyes:      Extraocular Movements: Extraocular movements intact. Conjunctiva/sclera: Conjunctivae normal.   Cardiovascular:      Rate and Rhythm: Normal rate and regular rhythm. Heart sounds: Normal heart sounds. Pulmonary:      Effort: Pulmonary effort is normal.      Breath sounds: Normal breath sounds. Abdominal:      General: Bowel sounds are normal. There is no distension. Palpations: Abdomen is soft. There is no mass. Tenderness: There is no abdominal tenderness. There is no guarding. Hernia: No hernia is present. Musculoskeletal:      Comments: Ambulates with a cane. Skin:     General: Skin is warm and dry. Neurological:      General: No focal deficit present. Mental Status: He is alert and oriented to person, place, and time. Psychiatric:         Mood and Affect: Mood normal.         Behavior: Behavior normal.           LABORATORY DATA: Reviewed  Lab Results   Component Value Date    WBC 6.4 03/08/2021    HGB 14.1 03/08/2021    HCT 42.3 03/08/2021    MCV 95 03/08/2021     03/08/2021     03/08/2021    K 4.2 03/08/2021     03/08/2021    CO2 31 03/08/2021    BUN 11 03/08/2021    CREATININE 0.72 03/08/2021    BILITOT Negative 03/08/2021         Lab Results   Component Value Date    RBC 4.47 03/08/2021    HGB 14.1 03/08/2021    MCV 95 03/08/2021    MCH 31.6 03/08/2021    MCHC 33.4 03/08/2021    RDW 14.1 03/08/2021    MPV 9.4 03/08/2021    BASOPCT 0.5 03/08/2021    LYMPHSABS 1.5 03/08/2021    MONOSABS 0.6 03/08/2021    NEUTROABS 3.8 03/08/2021    EOSABS 0.5 (H) 03/08/2021    BASOSABS 0.0 03/08/2021         DIAGNOSTIC TESTING:     No results found. Assessment  1. Chronic diarrhea        Plan  Patient appears to have chronic diarrhea. It is possible that it may be secondary to IBS.   However given persistent symptoms, nocturnal bowel movements, need to evaluate malabsorption syndromes. Discussed with the patient in detail regarding differential diagnosis. Advised 72-hour stool collection for fat and total weight. Discussed with the patient regarding stool collection process. Advised not to take antidiarrheal medications for almost 1 week prior to stool collection. To stay on regular food at that time. Advised symptomatic treatment of diarrhea. Labs ordered. We will see him in the next 6 weeks. Thank you for allowing me to participate in the care of Mr. Chyna Sue. For any further questions please do not hesitate to contact me. I have reviewed and agree with the ROS entered by the MA/LPN. Note is dictated utilizing voice recognition software. Unfortunately this leads to occasional typographical errors.  Please contact our office if you have any questions        Abdoulaye Bhatti MD,FACP, [facility]  Board Certified in Gastroenterology and [address] [place] [address] Gastroenterology  Office #: [phone]

## 2021-04-21 ENCOUNTER — TELEPHONE (OUTPATIENT)
Dept: PRIMARY CARE CLINIC | Age: 71
End: 2021-04-21

## 2021-04-21 DIAGNOSIS — B35.0 TINEA CAPITIS: Primary | ICD-10-CM

## 2021-04-21 RX ORDER — KETOCONAZOLE 20 MG/ML
SHAMPOO TOPICAL
Qty: 1 BOTTLE | Refills: 2 | Status: SHIPPED | OUTPATIENT
Start: 2021-04-21 | End: 2021-08-03 | Stop reason: ALTCHOICE

## 2021-04-21 NOTE — TELEPHONE ENCOUNTER
Pt saw you on 3/9 for Tinea Capitis. You mentioned giving him a shampoo rx. Pt would like the rx for the shampoo sent to [facility] listed. Starting to get redness on scalp, itchy.

## 2021-05-10 ENCOUNTER — TELEPHONE (OUTPATIENT)
Dept: GASTROENTEROLOGY | Age: 71
End: 2021-05-10

## 2021-05-10 NOTE — TELEPHONE ENCOUNTER
Pts wife called and cancelled appt as pt does not have labs done and they were confused about a test dr said he needed to do that was 72 hrs long.   Pt wants to speak with clinical before they reschedule so they make sure they get the correct testings done

## 2021-05-13 DIAGNOSIS — K52.9 CHRONIC DIARRHEA: Primary | ICD-10-CM

## 2021-05-17 NOTE — TELEPHONE ENCOUNTER
Writer spoke with the patient's wife and clarified the test to the caller. Caller verbalized understanding and thanked the writer.

## 2021-06-03 DIAGNOSIS — F41.9 ANXIETY: ICD-10-CM

## 2021-06-04 ENCOUNTER — OFFICE VISIT (OUTPATIENT)
Dept: PRIMARY CARE CLINIC | Age: 71
End: 2021-06-04
Payer: COMMERCIAL

## 2021-06-04 VITALS
OXYGEN SATURATION: 97 % | SYSTOLIC BLOOD PRESSURE: 126 MMHG | WEIGHT: 234 LBS | DIASTOLIC BLOOD PRESSURE: 70 MMHG | HEIGHT: 69 IN | BODY MASS INDEX: 34.66 KG/M2 | HEART RATE: 66 BPM

## 2021-06-04 DIAGNOSIS — R68.2 DRY MOUTH: ICD-10-CM

## 2021-06-04 DIAGNOSIS — L57.0 ACTINIC KERATOSIS: Primary | ICD-10-CM

## 2021-06-04 PROBLEM — Z96.612 S/P REVERSE TOTAL SHOULDER ARTHROPLASTY, LEFT: Status: ACTIVE | Noted: 2021-02-22

## 2021-06-04 PROCEDURE — 99999 PR OFFICE/OUTPT VISIT,PROCEDURE ONLY: CPT | Performed by: PHYSICIAN ASSISTANT

## 2021-06-04 PROCEDURE — 17003 DESTRUCT PREMALG LES 2-14: CPT | Performed by: PHYSICIAN ASSISTANT

## 2021-06-04 PROCEDURE — 17000 DESTRUCT PREMALG LESION: CPT | Performed by: PHYSICIAN ASSISTANT

## 2021-06-04 RX ORDER — LORAZEPAM 0.5 MG/1
TABLET ORAL
Qty: 30 TABLET | Refills: 0 | Status: SHIPPED | OUTPATIENT
Start: 2021-06-04 | End: 2021-07-29 | Stop reason: SDUPTHER

## 2021-06-04 ASSESSMENT — ENCOUNTER SYMPTOMS
CHEST TIGHTNESS: 0
SHORTNESS OF BREATH: 1
COLOR CHANGE: 0

## 2021-06-04 NOTE — PROGRESS NOTES
[facility]  [address] [facility] [address]  Dept: [address] Ces[address]es is a 65 y.o. male who presents today for his medical conditions/complaints as noted below. Chief Complaint   Patient presents with    6 Month Follow-Up     6 month check on AKs    Other     pilocarpine is not helping with dry mouth, pt would like to know if there is something else        HPI:     HPI   Patient here for 6 month follow up on AKs. Patient had 1 AK on the left side of the face lateral to the eyebrow, and another on the left ear lobe and had the spots biopsied. Patient reports no itchiness, bleeding, pain, discharge from the [facility] sites. Patient denies noticing new lesions he is concerned about.     LDL Calculated (mg/dL)   Date Value   10/23/2019 83   09/19/2017 84   09/20/2016 86       (goal LDL is <100)   BUN (mg/dL)   Date Value   03/08/2021 11     BP Readings from Last 3 Encounters:   06/04/21 126/70   04/08/21 123/82   03/16/21 128/79          (goal 120/80)    Past Medical History:   Diagnosis Date    Anxiety     Duodenitis     Fundic gland polyps of stomach, benign     Hyperlipidemia     Hypertension       Past Surgical History:   Procedure Laterality Date    APPENDECTOMY      BACK SURGERY      CHOLECYSTECTOMY      COLONOSCOPY  3/7/2012    tubular adenoma, hyperplastic polyp    COLONOSCOPY  05/28/2019    JOINT REPLACEMENT      hip    OTHER SURGICAL HISTORY  08/29/2016    Spinal stimulator    SPINAL CORD STIMULATOR SURGERY  08/29/2016    UPPER GASTROINTESTINAL ENDOSCOPY  05/28/2019    UPPER GASTROINTESTINAL ENDOSCOPY  10/15/2020       Family History   Problem Relation Age of Onset    Heart Disease Father     Hypertension Father     Hypertension Brother     Stroke Brother     Tuberculosis Brother        Social History     Tobacco Use    Smoking status: Former Smoker     Packs/day: 2.00     Years: 35.00     Pack years: 70.00     Types: the skin      lisinopril (PRINIVIL;ZESTRIL) 10 MG tablet TAKE 1 TABLET BY MOUTH ONE TIME A DAY 30 tablet 5    Multiple Vitamins-Minerals (OCUVITE PRESERVISION PO) Take 1 tablet by mouth daily      Acetaminophen (TYLENOL EXTRA STRENGTH PO) Take by mouth Indications: prn      POTASSIUM GLUCONATE PO Take by mouth daily       No current facility-administered medications for this visit. Allergies   Allergen Reactions    Cupric Oxide     Chrome Alum     Cobalt     Copper-Containing Compounds     Nickel        Health Maintenance   Topic Date Due    Shingles Vaccine (1 of 2) Never done   [employer] Visit (AWV)  Never done    Lipid screen  10/23/2020    Potassium monitoring  03/08/2022    Creatinine monitoring  03/08/2022    Colon cancer screen colonoscopy  05/28/2029    DTaP/Tdap/Td vaccine (2 - Td or Tdap) 10/01/2030    Flu vaccine  Completed    Pneumococcal 65+ years Vaccine  Completed    COVID-19 Vaccine  Completed    AAA screen  Completed    Hepatitis C screen  Completed    Hepatitis A vaccine  Aged Out    Hepatitis B vaccine  Aged Out    Hib vaccine  Aged Out    Meningococcal (ACWY) vaccine  Aged Out       Subjective:      Review of Systems   Constitutional: Negative for chills and fever. Respiratory: Positive for shortness of breath. Negative for chest tightness. Cardiovascular: Negative for chest pain and palpitations. Skin: Negative for color change, pallor, rash and wound. Negative for itching, bleeding, pain at AK sites. Objective:     /70   Pulse 66   Ht 5' 9\" (1.753 m)   Wt 234 lb (106.1 kg)   SpO2 97%   BMI 34.56 kg/m²   Physical Exam  Constitutional:       Appearance: Normal appearance. HENT:      Head: Normocephalic and atraumatic. Comments: Two rough patches on either side of the eyes. Nose: Nose normal.   Eyes:      Extraocular Movements: Extraocular movements intact.       Conjunctiva/sclera: Conjunctivae normal.      Pupils:

## 2021-06-11 RX ORDER — TRAZODONE HYDROCHLORIDE 50 MG/1
50 TABLET ORAL NIGHTLY
Qty: 30 TABLET | Refills: 5 | Status: SHIPPED | OUTPATIENT
Start: 2021-06-11 | End: 2021-12-06

## 2021-06-24 ENCOUNTER — OFFICE VISIT (OUTPATIENT)
Dept: PODIATRY | Age: 71
End: 2021-06-24
Payer: COMMERCIAL

## 2021-06-24 VITALS — WEIGHT: 234 LBS | HEIGHT: 69 IN | BODY MASS INDEX: 34.66 KG/M2

## 2021-06-24 DIAGNOSIS — I73.9 PVD (PERIPHERAL VASCULAR DISEASE) (HCC): ICD-10-CM

## 2021-06-24 DIAGNOSIS — B35.1 DERMATOPHYTOSIS OF NAIL: Primary | ICD-10-CM

## 2021-06-24 DIAGNOSIS — M79.604 BILATERAL LOWER EXTREMITY PAIN: ICD-10-CM

## 2021-06-24 DIAGNOSIS — L60.0 INGROWN NAIL: ICD-10-CM

## 2021-06-24 DIAGNOSIS — M79.605 BILATERAL LOWER EXTREMITY PAIN: ICD-10-CM

## 2021-06-24 PROCEDURE — 11721 DEBRIDE NAIL 6 OR MORE: CPT | Performed by: PODIATRIST

## 2021-06-24 NOTE — PROGRESS NOTES
[address] [facility] [address] [address] [address]  Dept: [phone]     PAIN PROGRESS NOTE  Date of patient's visit: 6/24/2021  Patient's Name:  Sarita Ennis YOB: 1950            Patient Care Team:  Maryana Cee PA-C as PCP - General (Physician Assistant)  Maryana Cee PA-C as PCP - [address] [place] [address] Provider  Cedric Borjas MD as Consulting Physician (Gastroenterology)  Evelina Cordero MD as Consulting Physician [facility])      Chief Complaint   Patient presents with    Nail Problem    Foot Pain    Peripheral Neuropathy       Subjective: This Sarita Ennis comes to clinic for foot and nail care. Pt currently has complaint of thickened, painful, elongated nails that he/she cannot manage by themselves. Pt. Relates pain to nails with shoe gear. Pt's primary care physician is Maryana Cee PA-C last seen June 4 2021.     Past Medical History:   Diagnosis Date    Anxiety     Duodenitis     Fundic gland polyps of stomach, benign     Hyperlipidemia     Hypertension        Allergies   Allergen Reactions    Cupric Oxide     Chrome Alum     Cobalt     Copper-Containing Compounds     Nickel      Current Outpatient Medications on File Prior to Visit   Medication Sig Dispense Refill    omeprazole (PRILOSEC) 20 MG delayed release capsule TAKE 1 CAPSULE BY MOUTH EVERY DAY 90 capsule 1    topiramate (TOPAMAX) 50 MG tablet TAKE 1 TABLET BY MOUTH TWO TIMES A DAY  120 tablet 1    atorvastatin (LIPITOR) 10 MG tablet TAKE 1 TABLET BY MOUTH ONE TIME A DAY  90 tablet 3    traZODone (DESYREL) 50 MG tablet Take 1 tablet by mouth nightly 30 tablet 5    LORazepam (ATIVAN) 0.5 MG tablet TAKE 1 TABLET BY MOUTH EVERY SIX HOURS AS NEEDED FOR ANXIETY 30 tablet 0    pregabalin (LYRICA) 50 MG capsule TAKE 1 CAPSULE BY MOUTH THREE TIMES A DAY  90 capsule 2    ketoconazole (NIZORAL) 2 % shampoo Apply topically and wash head and hair 2x/week and more often if does not resolve. 1 Bottle 2    allopurinol (ZYLOPRIM) 300 MG tablet TAKE 1 TABLET BY MOUTH ONE TIME A DAY  90 tablet 3    pilocarpine (SALAGEN) 5 MG tablet TAKE 1 TABLET BY MOUTH THREE TIMES A DAY  90 tablet 3    citalopram (CELEXA) 10 MG tablet TAKE 1 TABLET BY MOUTH EVERY DAY 30 tablet 5    clotrimazole-betamethasone (LOTRISONE) 1-0.05 % cream Apply topically 2 times daily. 45 g 1    vitamin D (ERGOCALCIFEROL) 1.25 MG (42899 UT) CAPS capsule Take 1 capsule by mouth once a week 24 capsule 0    dicyclomine (BENTYL) 10 MG capsule Take 1 capsule by mouth 4 times daily 120 capsule 3    clopidogrel (PLAVIX) 75 MG tablet Take 1 tablet by mouth daily 90 tablet 3    OnabotulinumtoxinA (BOTOX IJ) Inject as directed Patient is taking for migraines      calcium carbonate (OSCAL) 500 MG TABS tablet Take 500 mg by mouth daily      magnesium 30 MG tablet Take 30 mg by mouth 2 times daily      carvedilol (COREG) 6.25 MG tablet Take 1 tablet by mouth daily 30 tablet 2    Galcanezumab-gnlm (EMGALITY) 120 MG/ML SOAJ Inject into the skin      lisinopril (PRINIVIL;ZESTRIL) 10 MG tablet TAKE 1 TABLET BY MOUTH ONE TIME A DAY 30 tablet 5    Multiple Vitamins-Minerals (OCUVITE PRESERVISION PO) Take 1 tablet by mouth daily      Acetaminophen (TYLENOL EXTRA STRENGTH PO) Take by mouth Indications: prn      POTASSIUM GLUCONATE PO Take by mouth daily       No current facility-administered medications on file prior to visit. Review of Systems. Review of Systems:   History obtained from chart review and the patient  General ROS: negative for - chills, fatigue, fever, night sweats or weight gain  Constitutional: Negative for chills, diaphoresis, fatigue, fever and unexpected weight change. Musculoskeletal: Positive for arthralgias, gait problem and joint swelling. Neurological ROS: negative for - behavioral changes, confusion, headaches or seizures.  Negative for weakness and numbness. Dermatological ROS: negative for - mole changes, rash  Cardiovascular: Negative for leg swelling. Gastrointestinal: Negative for constipation, diarrhea, nausea and vomiting. Objective:  Dermatologic Exam:  Skin lesion/ulceration Absent . Skin No rashes or nodules noted. .   Skin is thin, with flaky sloughing skin as well as decreased hair growth to the lower leg  Small red hemosiderin deposits seen dorsal foot   Musculoskeletal:     1st MPJ ROM decreased, Bilateral.  Muscle strength 5/5, Bilateral.  Pain present upon palpation of toenails 1-5, Bilateral. decreased medial longitudinal arch, Bilateral.  Ankle ROM decreased,Bilateral.    Dorsally contracted digits present digits 2, Bilateral.     Vascular: DP pulses 1/4 bilateral.  PT pulses 0/4 bilateral.   CFT <5 seconds, Bilateral.  Hair growth absent to the level of the digits, Bilateral.  Edema present, Bilateral.  Varicosities absent, Bilateral. Erythema absent, Bilateral    Neurological: Sensation diminshed to light touch to level of digits, Bilateral.  Protective sensation intact 6/10 sites via 5.07/10g [place] Monofilament, Bilateral.  negative Tinel's, Bilateral.  negative Valleix sign, Bilateral.      Integument: Warm, dry, supple, Bilateral.  Open lesion absent, Bilateral.  Interdigital maceration absent to web spaces 4, Bilateral.  Nails 1-5 left and 1-5 right thickened > 3.0 mm, dystrophic and crumbly, discolored with subungual debris. Fissures absent, Bilateral.   General: AAO x 3 in NAD.     Derm  Toenail Description  Sites of Onychomycosis Involvement (Check affected area)  [x] [x] [x] [x] [x] [x] [x] [x] [x] [x]  5 4 3 2 1 1 2 3 4 5                          Right                                        Left    Thickness  [x] [x] [x] [x] [x] [x] [x] [x] [x] [x]  5 4 3 2 1 1 2 3 4 5                         Right                                        Left    Dystrophic Changes [x] [x] [x] [x] [x] [x] [x] [x] [x] [x]  5 4 3 2 1 1 2 3 4 5                         Right                                        Left    Color  [x] [x] [x] [x] [x] [x] [x] [x] [x] [x]  5 4 3 2 1 1 2 3 4 5                          Right                                        Left    Incurvation/Ingrowin   [] [] [] [] [] [] [] [] [] []  5 4 3 2 1 1 2 3 4 5                         Right                                        Left    Inflammation/Pain   [x] [x] [x] [x] [x] [x] [x] [x] [x] [x]  5 4 3  2 1 1 2 3 4 5                         Right                                        Left       Nails are painful 1-10 with direct palpation. Q7   []Yes  []No                Q8   [x]Yes  []No                     Q9   []Yes    []No  Assessment:  65 y.o. male with:    Diagnosis Orders   1. Dermatophytosis of nail  06491 - MO DEBRIDEMENT OF NAILS, 6 OR MORE   2. Bilateral lower extremity pain  96057 - MO DEBRIDEMENT OF NAILS, 6 OR MORE   3. PVD (peripheral vascular disease) ([facility] [address]  28209 - MO DEBRIDEMENT OF NAILS, 6 OR MORE   4. Ingrown nail  95333 - MO DEBRIDEMENT OF NAILS, 6 OR MORE         Plan:   Pt was evaluated and examined. Patient was given personalized discharge instructions. Nails 1-10 were debrided in length and thickness sharply with a nail nipper and  without incident. Pt will follow up in 9 weeks or sooner if any problems arise. Diagnosis was discussed with the pt and all of their questions were answered in detail. Proper foot hygiene and care was discussed with the pt. Patient to check feet daily and contact the office with any questions/problems/concerns. Other comorbidity noted and will be managed by PCP. Pain waiver discussed with patient and confirmed.    6/24/2021      Electronically signed by Francisca Plunkett DPM on 6/24/2021 at 9:33 AM  6/24/2021 167.64

## 2021-06-28 RX ORDER — CITALOPRAM 10 MG/1
TABLET ORAL
Qty: 30 TABLET | Refills: 1 | Status: SHIPPED | OUTPATIENT
Start: 2021-06-28 | End: 2021-08-03 | Stop reason: DRUGHIGH

## 2021-07-14 RX ORDER — LISINOPRIL 10 MG/1
TABLET ORAL
Qty: 30 TABLET | Refills: 0 | Status: SHIPPED | OUTPATIENT
Start: 2021-07-14 | End: 2021-08-10 | Stop reason: SDUPTHER

## 2021-07-22 ENCOUNTER — TELEPHONE (OUTPATIENT)
Dept: GASTROENTEROLOGY | Age: 71
End: 2021-07-22

## 2021-07-22 NOTE — TELEPHONE ENCOUNTER
Writer received a message from patient's wife stated that they went to [facility] to get labs complete and were told there was no lab available. Writer faxed the ordering labs to [facility] on 7/22/21.

## 2021-07-27 NOTE — TELEPHONE ENCOUNTER
Writer called patient's wife on 7/27/21 and told the caller that the labs were sent to [facility] on 7/22/21. Caller verbalized understanding and thanked the writer.

## 2021-07-29 DIAGNOSIS — F41.9 ANXIETY: ICD-10-CM

## 2021-07-29 RX ORDER — LORAZEPAM 0.5 MG/1
0.5 TABLET ORAL EVERY 6 HOURS PRN
Qty: 30 TABLET | Refills: 0 | Status: SHIPPED | OUTPATIENT
Start: 2021-07-29 | End: 2021-08-28

## 2021-08-03 ENCOUNTER — OFFICE VISIT (OUTPATIENT)
Dept: PRIMARY CARE CLINIC | Age: 71
End: 2021-08-03
Payer: COMMERCIAL

## 2021-08-03 VITALS
DIASTOLIC BLOOD PRESSURE: 78 MMHG | WEIGHT: 238 LBS | HEIGHT: 69 IN | SYSTOLIC BLOOD PRESSURE: 124 MMHG | BODY MASS INDEX: 35.25 KG/M2 | HEART RATE: 76 BPM | OXYGEN SATURATION: 97 %

## 2021-08-03 DIAGNOSIS — Z00.00 ROUTINE GENERAL MEDICAL EXAMINATION AT A HEALTH CARE FACILITY: ICD-10-CM

## 2021-08-03 DIAGNOSIS — E78.2 MIXED HYPERLIPIDEMIA: ICD-10-CM

## 2021-08-03 DIAGNOSIS — R06.02 SOB (SHORTNESS OF BREATH): Primary | ICD-10-CM

## 2021-08-03 DIAGNOSIS — R91.1 PULMONARY NODULE: ICD-10-CM

## 2021-08-03 PROCEDURE — G0439 PPPS, SUBSEQ VISIT: HCPCS | Performed by: PHYSICIAN ASSISTANT

## 2021-08-03 RX ORDER — THIAMINE MONONITRATE (VIT B1) 100 MG
100 TABLET ORAL DAILY
COMMUNITY

## 2021-08-03 RX ORDER — CITALOPRAM 20 MG/1
TABLET ORAL
Qty: 30 TABLET | Refills: 2 | Status: SHIPPED | OUTPATIENT
Start: 2021-08-03 | End: 2021-08-25 | Stop reason: SDUPTHER

## 2021-08-03 RX ORDER — BUDESONIDE 0.5 MG/2ML
INHALANT ORAL
COMMUNITY
Start: 2021-07-30 | End: 2022-09-08 | Stop reason: CLARIF

## 2021-08-03 SDOH — ECONOMIC STABILITY: FOOD INSECURITY: WITHIN THE PAST 12 MONTHS, THE FOOD YOU BOUGHT JUST DIDN'T LAST AND YOU DIDN'T HAVE MONEY TO GET MORE.: NEVER TRUE

## 2021-08-03 SDOH — ECONOMIC STABILITY: FOOD INSECURITY: WITHIN THE PAST 12 MONTHS, YOU WORRIED THAT YOUR FOOD WOULD RUN OUT BEFORE YOU GOT MONEY TO BUY MORE.: NEVER TRUE

## 2021-08-03 ASSESSMENT — SOCIAL DETERMINANTS OF HEALTH (SDOH): HOW HARD IS IT FOR YOU TO PAY FOR THE VERY BASICS LIKE FOOD, HOUSING, MEDICAL CARE, AND HEATING?: NOT HARD AT ALL

## 2021-08-03 ASSESSMENT — PATIENT HEALTH QUESTIONNAIRE - PHQ9
SUM OF ALL RESPONSES TO PHQ9 QUESTIONS 1 & 2: 1
SUM OF ALL RESPONSES TO PHQ QUESTIONS 1-9: 1
2. FEELING DOWN, DEPRESSED OR HOPELESS: 1
SUM OF ALL RESPONSES TO PHQ QUESTIONS 1-9: 1
SUM OF ALL RESPONSES TO PHQ QUESTIONS 1-9: 1
1. LITTLE INTEREST OR PLEASURE IN DOING THINGS: 0

## 2021-08-03 NOTE — PATIENT INSTRUCTIONS
Personalized Preventive Plan for Asha Marquis - 8/3/2021  Medicare offers a range of preventive health benefits. Some of the tests and screenings are paid in full while other may be subject to a deductible, co-insurance, and/or copay. Some of these benefits include a comprehensive review of your medical history including lifestyle, illnesses that may run in your family, and various assessments and screenings as appropriate. After reviewing your medical record and screening and assessments performed today your provider may have ordered immunizations, labs, imaging, and/or referrals for you. A list of these orders (if applicable) as well as your Preventive Care list are included within your After Visit Summary for your review. Other Preventive Recommendations:    · A preventive eye exam performed by an eye specialist is recommended every 1-2 years to screen for glaucoma; cataracts, macular degeneration, and other eye disorders. · A preventive dental visit is recommended every 6 months. · Try to get at least 150 minutes of exercise per week or 10,000 steps per day on a pedometer . · Order or download the FREE \"Exercise & Physical Activity: Your Everyday Guide\" from [employer]. Call 3-469.612.7598 or search [employer] online. · You need 8357-5995 mg of calcium and 4174-9081 IU of vitamin D per day. It is possible to meet your calcium requirement with diet alone, but a vitamin D supplement is usually necessary to meet this goal.  · When exposed to the sun, use a sunscreen that protects against both UVA and UVB radiation with an SPF of 30 or greater. Reapply every 2 to 3 hours or after sweating, drying off with a towel, or swimming. · Always wear a seat belt when traveling in a car. Always wear a helmet when riding a bicycle or motorcycle.

## 2021-08-03 NOTE — PROGRESS NOTES
Medicare Annual Wellness Visit  Name: Stephanie Bacon Date: 8/3/2021   MRN: [id number] Sex: Male   Age: 65 y.o. Ethnicity: Unavailable / Unknown   : 1950 Race: White (non-)      Symone Thibodeaux is here for Medicare AWV (SOB when walking)    Screenings for behavioral, psychosocial and functional/safety risks, and cognitive dysfunction are all negative except as indicated below. These results, as well as other patient data from the [address] form, are documented in Flowsheets linked to this Encounter. Just found polyp in nasal passages and ENT says this could cause SOB     Allergies   Allergen Reactions    Cupric Oxide     Chrome Alum     Cobalt     Copper-Containing Compounds     Nickel          Prior to Visit Medications    Medication Sig Taking? Authorizing Provider   budesonide (PULMICORT) 0.5 MG/2ML nebulizer suspension USE 2 VIALS AS DIRECTED IN A SINUS RINSE BOTTLE PER DAY Yes Historical Provider, MD   vitamin B-1 (THIAMINE) 100 MG tablet Take 100 mg by mouth daily Yes Historical Provider, MD   citalopram (CELEXA) 20 MG tablet TAKE 1 TABLET BY MOUTH EVERY DAY Yes Mia Dorsey PA-C   LORazepam (ATIVAN) 0.5 MG tablet Take 1 tablet by mouth every 6 hours as needed for Anxiety for up to 30 days.  Yes Mia Dorsey PA-C   lisinopril (PRINIVIL;ZESTRIL) 10 MG tablet TAKE 1 TABLET BY MOUTH ONE TIME A DAY Yes Mia Dorsey PA-C   omeprazole (PRILOSEC) 20 MG delayed release capsule TAKE 1 CAPSULE BY MOUTH EVERY DAY Yes Mia Dorsey PA-C   topiramate (TOPAMAX) 50 MG tablet TAKE 1 TABLET BY MOUTH TWO TIMES A DAY  Yes Mia Dorsey PA-C   atorvastatin (LIPITOR) 10 MG tablet TAKE 1 TABLET BY MOUTH ONE TIME A DAY  Yes Mia Dorsey PA-C   traZODone (DESYREL) 50 MG tablet Take 1 tablet by mouth nightly Yes Mia Dorsey PA-C   pregabalin (LYRICA) 50 MG capsule TAKE 1 CAPSULE BY MOUTH THREE TIMES A DAY  Yes Mia Dorsey PA-C allopurinol (ZYLOPRIM) 300 MG tablet TAKE 1 TABLET BY MOUTH ONE TIME A DAY  Yes Dylan Stevens PA-C   pilocarpine (SALAGEN) 5 MG tablet TAKE 1 TABLET BY MOUTH THREE TIMES A DAY  Yes Dylan Stevens PA-C   vitamin D (ERGOCALCIFEROL) 1.25 MG (31537 UT) CAPS capsule Take 1 capsule by mouth once a week Yes Inessa Vance MD   dicyclomine (BENTYL) 10 MG capsule Take 1 capsule by mouth 4 times daily Yes Candy Kanner, APRN - NP   clopidogrel (PLAVIX) 75 MG tablet Take 1 tablet by mouth daily Yes Dylan Stevens PA-C   OnabotulinumtoxinA (BOTOX IJ) Inject as directed Patient is taking for migraines Yes Historical Provider, MD   calcium carbonate (OSCAL) 500 MG TABS tablet Take 500 mg by mouth daily Yes Historical Provider, MD   magnesium 30 MG tablet Take 30 mg by mouth 2 times daily Yes Historical Provider, MD   carvedilol (COREG) 6.25 MG tablet Take 1 tablet by mouth daily Yes Dylan Stevens PA-C   Galcanezumab-gnlm (EMGALITY) 120 MG/ML SOAJ Inject into the skin Yes Historical Provider, MD   Multiple Vitamins-Minerals (OCUVITE PRESERVISION PO) Take 1 tablet by mouth daily Yes Historical Provider, MD   POTASSIUM GLUCONATE PO Take by mouth daily Yes Historical Provider, MD   Acetaminophen (TYLENOL EXTRA STRENGTH PO) Take by mouth Indications: prn  Historical Provider, MD         Past Medical History:   Diagnosis Date    Anxiety     Duodenitis     Fundic gland polyps of stomach, benign     Hyperlipidemia     Hypertension        Past Surgical History:   Procedure Laterality Date    APPENDECTOMY      BACK SURGERY      CHOLECYSTECTOMY      COLONOSCOPY  3/7/2012    tubular adenoma, hyperplastic polyp    COLONOSCOPY  05/28/2019    JOINT REPLACEMENT      hip    OTHER SURGICAL HISTORY  08/29/2016    Spinal stimulator    SPINAL CORD STIMULATOR SURGERY  08/29/2016    UPPER GASTROINTESTINAL ENDOSCOPY  05/28/2019    UPPER GASTROINTESTINAL ENDOSCOPY  10/15/2020         Family History Aged Out    Hepatitis B vaccine  Aged Out    Hib vaccine  Aged Out    Meningococcal (ACWY) vaccine  Aged Out     Recommendations for [employer] Due: see orders and patient instructions/AVS.  . Recommended screening schedule for the next 5-10 years is provided to the patient in written form: see Patient Josefina Augustin was seen today for medicare awv. Diagnoses and all orders for this visit:    SOB (shortness of breath)  -     CT LUNG SCREENING; Future    Routine general medical examination at a health care facility    Pulmonary nodule  -     CT LUNG SCREENING; Future    Mixed hyperlipidemia  -     Lipid Panel; Future  -     ALT; Future  -     AST; Future    Other orders  -     citalopram (CELEXA) 20 MG tablet; TAKE 1 TABLET BY MOUTH EVERY DAY           See cardiologist now about SOB  CTscan ordered  Check on Lwill and DPOA   Start ASA 81mg 1 po qd  Get shingrix at pharmacy  Increased Celexa    Cardiovascular Disease Risk Counseling: Assessed the patient's risk to develop cardiovascular disease and reviewed main risk factors. Reviewed steps to reduce disease risk including:   · Quitting tobacco use, reducing amount smoked, or not starting the habit  · Making healthy food choices  · Being physically active and gradualy increasing activity levels   · Reduce weight and determine a healthy BMI goal  · Monitor blood pressure and treat if higher than 140/90 mmHg  · Maintain blood total cholesterol levels under 5 mmol/l or 190 mg/dl  · Maintain LDL cholesterol levels under 3.0 mmol/l or 115 mg/dl   · Control blood glucose levels  · Consider taking aspirin (75 mg daily), once blood pressure is controlled   Provided a follow up plan.   Time spent (minutes): 10

## 2021-08-09 DIAGNOSIS — M79.605 LEG PAIN, LATERAL, LEFT: ICD-10-CM

## 2021-08-09 DIAGNOSIS — R20.2 PARESTHESIA OF LEFT LEG: ICD-10-CM

## 2021-08-09 DIAGNOSIS — K20.80 CORROSIVE ESOPHAGITIS: ICD-10-CM

## 2021-08-09 DIAGNOSIS — M51.36 DISC DEGENERATION, LUMBAR: ICD-10-CM

## 2021-08-10 NOTE — TELEPHONE ENCOUNTER
Please clarify Topamax dose: is it 1 tablet twice a day so 180 for 3 months or ? Also ask if he made a cardiology appt yet?

## 2021-08-19 RX ORDER — ERGOCALCIFEROL 1.25 MG/1
50000 CAPSULE ORAL WEEKLY
Qty: 24 CAPSULE | Refills: 1 | Status: SHIPPED | OUTPATIENT
Start: 2021-08-19 | End: 2022-09-08 | Stop reason: ALTCHOICE

## 2021-08-19 RX ORDER — ATORVASTATIN CALCIUM 10 MG/1
TABLET, FILM COATED ORAL
Qty: 90 TABLET | Refills: 3 | Status: SHIPPED | OUTPATIENT
Start: 2021-08-19 | End: 2021-10-21 | Stop reason: DRUGHIGH

## 2021-08-19 RX ORDER — PREGABALIN 50 MG/1
CAPSULE ORAL
Qty: 90 CAPSULE | Refills: 2 | Status: SHIPPED | OUTPATIENT
Start: 2021-08-19 | End: 2022-02-07

## 2021-08-19 RX ORDER — OMEPRAZOLE 20 MG/1
CAPSULE, DELAYED RELEASE ORAL
Qty: 90 CAPSULE | Refills: 1 | Status: SHIPPED | OUTPATIENT
Start: 2021-08-19 | End: 2022-06-06

## 2021-08-19 RX ORDER — LISINOPRIL 10 MG/1
TABLET ORAL
Qty: 90 TABLET | Refills: 1 | Status: SHIPPED | OUTPATIENT
Start: 2021-08-19 | End: 2021-09-14 | Stop reason: SDUPTHER

## 2021-08-19 RX ORDER — TOPIRAMATE 50 MG/1
TABLET, FILM COATED ORAL
Qty: 180 TABLET | Refills: 1 | Status: SHIPPED | OUTPATIENT
Start: 2021-08-19 | End: 2022-03-08

## 2021-08-25 RX ORDER — CITALOPRAM 20 MG/1
TABLET ORAL
Qty: 90 TABLET | Refills: 1 | Status: SHIPPED | OUTPATIENT
Start: 2021-08-25 | End: 2021-09-28 | Stop reason: SDUPTHER

## 2021-08-25 NOTE — TELEPHONE ENCOUNTER
Wife called back states he has not seen cardiology yet but will call and schedule. She is not home to check topomax dosage but will call us back.

## 2021-08-26 ENCOUNTER — TELEPHONE (OUTPATIENT)
Dept: PRIMARY CARE CLINIC | Age: 71
End: 2021-08-26

## 2021-08-26 NOTE — TELEPHONE ENCOUNTER
----- Message from Mejia Disla sent at 8/26/2021 10:04 AM EDT -----  Subject: Message to Provider    QUESTIONS  Information for Provider? Patient talked to office yesterday and is   sending message to let them know he takes his topiramate 50 mg 2x daily,   morning and night, and was told to make an appointment with his   cardiologist, but his Wife Anum Escobar is wondering why he needs an appointment. Please call back to let them know what to make the appointment for. Number   left is for Anum Escobar.   ---------------------------------------------------------------------------  --------------  Leisa BARAJAS  What is the best way for the office to contact you? OK to leave message on   voicemail  Preferred Call Back Phone Number? [phone]  ---------------------------------------------------------------------------  --------------  SCRIPT ANSWERS  Relationship to Patient? Other  Representative Name? Wife Magen Lombardi  Is the Representative on the appropriate HIPAA document in Epic?  Yes

## 2021-08-26 NOTE — TELEPHONE ENCOUNTER
----- Message from Melani Maria sent at 8/26/2021  9:29 AM EDT -----  Subject: Message to Provider    QUESTIONS  Information for Provider? Pt had orders put in for CT Scan w/o contrast of   chest. Calling because no reason for scan is on order. Needs order faxed   to them with reasoning. Abhilash [phone]  ---------------------------------------------------------------------------  --------------  Nely BARAJAS  What is the best way for the office to contact you? OK to leave message on   voicemail  Preferred Call Back Phone Number? [phone]  ---------------------------------------------------------------------------  --------------  SCRIPT ANSWERS  Relationship to Patient? Third Party  Representative Name?  Maki Funk

## 2021-08-27 ENCOUNTER — TELEPHONE (OUTPATIENT)
Dept: PRIMARY CARE CLINIC | Age: 71
End: 2021-08-27

## 2021-08-27 DIAGNOSIS — Z87.891 FORMER SMOKER: ICD-10-CM

## 2021-08-27 DIAGNOSIS — R06.02 SHORTNESS OF BREATH: ICD-10-CM

## 2021-08-27 DIAGNOSIS — R91.8 PULMONARY NODULES: Primary | ICD-10-CM

## 2021-08-27 NOTE — TELEPHONE ENCOUNTER
Please let Irish Bee and Saurabh Aguiar know the screening CT of chest for lung cancer is not covered and if the SOB is not from a cardiac issue, then I would like him to have a CT of chest for the SOB.

## 2021-08-27 NOTE — TELEPHONE ENCOUNTER
[address] radiology called, states pt does not qualify for low dose CT lung d/t >15 yrs since he quit smoking. She advised CT chest WO contrast could possibly be covered.

## 2021-08-27 NOTE — TELEPHONE ENCOUNTER
At his Medicare AWV, he reported SOB frequently so I would like him to see his cardiologist. High Dose Vitamin A Pregnancy And Lactation Text: High dose vitamin A therapy is contraindicated during pregnancy and breast feeding. Doxycycline Counseling:  Patient counseled regarding possible photosensitivity and increased risk for sunburn.  Patient instructed to avoid sunlight, if possible.  When exposed to sunlight, patients should wear protective clothing, sunglasses, and sunscreen.  The patient was instructed to call the office immediately if the following severe adverse effects occur:  hearing changes, easy bruising/bleeding, severe headache, or vision changes.  The patient verbalized understanding of the proper use and possible adverse effects of doxycycline.  All of the patient's questions and concerns were addressed. Benzoyl Peroxide Pregnancy And Lactation Text: This medication is Pregnancy Category C. It is unknown if benzoyl peroxide is excreted in breast milk. Birth Control Pills Counseling: Birth Control Pill Counseling: I discussed with the patient the potential side effects of OCPs including but not limited to increased risk of stroke, heart attack, thrombophlebitis, deep venous thrombosis, hepatic adenomas, breast changes, GI upset, headaches, and depression.  The patient verbalized understanding of the proper use and possible adverse effects of OCPs. All of the patient's questions and concerns were addressed. Use Enhanced Medication Counseling?: No Isotretinoin Counseling: Patient should get monthly blood tests, not donate blood, not drive at night if vision affected, not share medication, and not undergo elective surgery for 6 months after tx completed. Side effects reviewed, pt to contact office should one occur. Spironolactone Counseling: Patient advised regarding risks of diarrhea, abdominal pain, hyperkalemia, birth defects (for female patients), liver toxicity and renal toxicity. The patient may need blood work to monitor liver and kidney function and potassium levels while on therapy. The patient verbalized understanding of the proper use and possible adverse effects of spironolactone.  All of the patient's questions and concerns were addressed. Erythromycin Pregnancy And Lactation Text: This medication is Pregnancy Category B and is considered safe during pregnancy. It is also excreted in breast milk. Doxycycline Pregnancy And Lactation Text: This medication is Pregnancy Category D and not consider safe during pregnancy. It is also excreted in breast milk but is considered safe for shorter treatment courses. Bactrim Counseling:  I discussed with the patient the risks of sulfa antibiotics including but not limited to GI upset, allergic reaction, drug rash, diarrhea, dizziness, photosensitivity, and yeast infections.  Rarely, more serious reactions can occur including but not limited to aplastic anemia, agranulocytosis, methemoglobinemia, blood dyscrasias, liver or kidney failure, lung infiltrates or desquamative/blistering drug rashes. Erythromycin Counseling:  I discussed with the patient the risks of erythromycin including but not limited to GI upset, allergic reaction, drug rash, diarrhea, increase in liver enzymes, and yeast infections. Isotretinoin Pregnancy And Lactation Text: This medication is Pregnancy Category X and is considered extremely dangerous during pregnancy. It is unknown if it is excreted in breast milk. Benzoyl Peroxide Counseling: Patient counseled that medicine may cause skin irritation and bleach clothing.  In the event of skin irritation, the patient was advised to reduce the amount of the drug applied or use it less frequently.   The patient verbalized understanding of the proper use and possible adverse effects of benzoyl peroxide.  All of the patient's questions and concerns were addressed. Tazorac Counseling:  Patient advised that medication is irritating and drying.  Patient may need to apply sparingly and wash off after an hour before eventually leaving it on overnight.  The patient verbalized understanding of the proper use and possible adverse effects of tazorac.  All of the patient's questions and concerns were addressed. Topical Clindamycin Counseling: Patient counseled that this medication may cause skin irritation or allergic reactions.  In the event of skin irritation, the patient was advised to reduce the amount of the drug applied or use it less frequently.   The patient verbalized understanding of the proper use and possible adverse effects of clindamycin.  All of the patient's questions and concerns were addressed. Azithromycin Pregnancy And Lactation Text: This medication is considered safe during pregnancy and is also secreted in breast milk. Topical Clindamycin Pregnancy And Lactation Text: This medication is Pregnancy Category B and is considered safe during pregnancy. It is unknown if it is excreted in breast milk. Azithromycin Counseling:  I discussed with the patient the risks of azithromycin including but not limited to GI upset, allergic reaction, drug rash, diarrhea, and yeast infections. Dapsone Pregnancy And Lactation Text: This medication is Pregnancy Category C and is not considered safe during pregnancy or breast feeding. Tetracycline Counseling: Patient counseled regarding possible photosensitivity and increased risk for sunburn.  Patient instructed to avoid sunlight, if possible.  When exposed to sunlight, patients should wear protective clothing, sunglasses, and sunscreen.  The patient was instructed to call the office immediately if the following severe adverse effects occur:  hearing changes, easy bruising/bleeding, severe headache, or vision changes.  The patient verbalized understanding of the proper use and possible adverse effects of tetracycline.  All of the patient's questions and concerns were addressed. Patient understands to avoid pregnancy while on therapy due to potential birth defects. Tazorac Pregnancy And Lactation Text: This medication is not safe during pregnancy. It is unknown if this medication is excreted in breast milk. Detail Level: Zone Tetracycline Pregnancy And Lactation Text: This medication is Pregnancy Category D and not consider safe during pregnancy. It is also excreted in breast milk. Topical Retinoid counseling:  Patient advised to apply a pea-sized amount only at bedtime and wait 30 minutes after washing their face before applying.  If too drying, patient may add a non-comedogenic moisturizer. The patient verbalized understanding of the proper use and possible adverse effects of retinoids.  All of the patient's questions and concerns were addressed. Birth Control Pills Pregnancy And Lactation Text: This medication should be avoided if pregnant and for the first 30 days post-partum. Dapsone Counseling: I discussed with the patient the risks of dapsone including but not limited to hemolytic anemia, agranulocytosis, rashes, methemoglobinemia, kidney failure, peripheral neuropathy, headaches, GI upset, and liver toxicity.  Patients who start dapsone require monitoring including baseline LFTs and weekly CBCs for the first month, then every month thereafter.  The patient verbalized understanding of the proper use and possible adverse effects of dapsone.  All of the patient's questions and concerns were addressed. Topical Sulfur Applications Pregnancy And Lactation Text: This medication is Pregnancy Category C and has an unknown safety profile during pregnancy. It is unknown if this topical medication is excreted in breast milk. Minocycline Counseling: Patient advised regarding possible photosensitivity and discoloration of the teeth, skin, lips, tongue and gums.  Patient instructed to avoid sunlight, if possible.  When exposed to sunlight, patients should wear protective clothing, sunglasses, and sunscreen.  The patient was instructed to call the office immediately if the following severe adverse effects occur:  hearing changes, easy bruising/bleeding, severe headache, or vision changes.  The patient verbalized understanding of the proper use and possible adverse effects of minocycline.  All of the patient's questions and concerns were addressed. Bactrim Pregnancy And Lactation Text: This medication is Pregnancy Category D and is known to cause fetal risk.  It is also excreted in breast milk. Spironolactone Pregnancy And Lactation Text: This medication can cause feminization of the male fetus and should be avoided during pregnancy. The active metabolite is also found in breast milk. Sarecycline Counseling: Patient advised regarding possible photosensitivity and discoloration of the teeth, skin, lips, tongue and gums.  Patient instructed to avoid sunlight, if possible.  When exposed to sunlight, patients should wear protective clothing, sunglasses, and sunscreen.  The patient was instructed to call the office immediately if the following severe adverse effects occur:  hearing changes, easy bruising/bleeding, severe headache, or vision changes.  The patient verbalized understanding of the proper use and possible adverse effects of sarecycline.  All of the patient's questions and concerns were addressed. Topical Sulfur Applications Counseling: Topical Sulfur Counseling: Patient counseled that this medication may cause skin irritation or allergic reactions.  In the event of skin irritation, the patient was advised to reduce the amount of the drug applied or use it less frequently.   The patient verbalized understanding of the proper use and possible adverse effects of topical sulfur application.  All of the patient's questions and concerns were addressed. High Dose Vitamin A Counseling: Side effects reviewed, pt to contact office should one occur. Topical Retinoid Pregnancy And Lactation Text: This medication is Pregnancy Category C. It is unknown if this medication is excreted in breast milk.

## 2021-08-30 NOTE — TELEPHONE ENCOUNTER
Pt and spouse confused re the CT scan. Asking, if you can call her to clarify? [phone].   Angel requesting a CT chest w/out contrast be faxed to [phone]

## 2021-08-30 NOTE — TELEPHONE ENCOUNTER
Please fax to [phone] to explain that because he quit smoking over 15 years ago now, the CT can't be a screening

## 2021-09-09 ENCOUNTER — TELEPHONE (OUTPATIENT)
Dept: PRIMARY CARE CLINIC | Age: 71
End: 2021-09-09

## 2021-09-09 NOTE — TELEPHONE ENCOUNTER
Patients wife is calling and states the patient takes plavix for peripheral artery disease and is getting sinus surgery next Wednesday 9/15/2021. Per Patients wife- Dr. Veronica Ball is asking if the patient is okay to stop his plavix 5 days before surgery?     Please advise

## 2021-09-10 DIAGNOSIS — Z87.891 FORMER SMOKER: ICD-10-CM

## 2021-09-10 DIAGNOSIS — R06.02 SHORTNESS OF BREATH: ICD-10-CM

## 2021-09-10 DIAGNOSIS — R91.8 PULMONARY NODULES: ICD-10-CM

## 2021-09-14 RX ORDER — LISINOPRIL 10 MG/1
TABLET ORAL
Qty: 90 TABLET | Refills: 1 | Status: SHIPPED | OUTPATIENT
Start: 2021-09-14 | End: 2022-06-13

## 2021-09-23 ENCOUNTER — OFFICE VISIT (OUTPATIENT)
Dept: PODIATRY | Age: 71
End: 2021-09-23
Payer: COMMERCIAL

## 2021-09-23 VITALS — BODY MASS INDEX: 35.25 KG/M2 | HEIGHT: 69 IN | WEIGHT: 238 LBS | RESPIRATION RATE: 16 BRPM

## 2021-09-23 DIAGNOSIS — M79.605 BILATERAL LOWER EXTREMITY PAIN: ICD-10-CM

## 2021-09-23 DIAGNOSIS — M79.604 BILATERAL LOWER EXTREMITY PAIN: ICD-10-CM

## 2021-09-23 DIAGNOSIS — I73.9 PVD (PERIPHERAL VASCULAR DISEASE) (HCC): ICD-10-CM

## 2021-09-23 DIAGNOSIS — B35.1 DERMATOPHYTOSIS OF NAIL: Primary | ICD-10-CM

## 2021-09-23 PROCEDURE — 11721 DEBRIDE NAIL 6 OR MORE: CPT | Performed by: PODIATRIST

## 2021-09-23 NOTE — PROGRESS NOTES
[address] [facility] [address] [address]  Dept: [phone]     PAIN PROGRESS NOTE  Date of patient's visit: 9/23/2021  Patient's Name:  Massiel Perkins YOB: 1950            Patient Care Team:  Rebecca Delgado PA-C as PCP - General (Physician Assistant)  Rebecca Del[facility]-C [facility]  Ailyn Sinclair MD as Consulting Physician (Gastroenterology)  Katie Brown MD as Consulting Physician [facility] Medicine)      Chief Complaint   Patient presents with    Nail Problem       Subjective: This Massiel Perkins comes to clinic for foot and nail care. Pt currently has complaint of thickened, painful, elongated nails that he/she cannot manage by themselves. Pt. Relates pain to nails with shoe gear. Pt's primary care physician is Rebecca Delgado PA-C last seen 08/03/2021.     Past Medical History:   Diagnosis Date    Anxiety     Duodenitis     Fundic gland polyps of stomach, benign     Hyperlipidemia     Hypertension        Allergies   Allergen Reactions    Cupric Oxide     Chrome Alum     Cobalt     Copper-Containing Compounds     Nickel      Current Outpatient Medications on File Prior to Visit   Medication Sig Dispense Refill    lisinopril (PRINIVIL;ZESTRIL) 10 MG tablet TAKE 1 TABLET BY MOUTH ONE TIME A DAY 90 tablet 1    citalopram (CELEXA) 20 MG tablet TAKE 1 TABLET BY MOUTH EVERY DAY 90 tablet 1    topiramate (TOPAMAX) 50 MG tablet TAKE 1 TABLET BY MOUTH TWO TIMES A  tablet 1    omeprazole (PRILOSEC) 20 MG delayed release capsule TAKE 1 CAPSULE BY MOUTH EVERY DAY 90 capsule 1    vitamin D (ERGOCALCIFEROL) 1.25 MG (85284 UT) CAPS capsule Take 1 capsule by mouth once a week 24 capsule 1    atorvastatin (LIPITOR) 10 MG tablet TAKE 1 TABLET BY MOUTH ONE TIME A DAY 90 tablet 3    budesonide (PULMICORT) 0.5 MG/2ML nebulizer suspension USE 2 VIALS AS DIRECTED IN A SINUS RINSE BOTTLE PER DAY      vitamin B-1 (THIAMINE) 100 MG tablet Take 100 mg by mouth daily      traZODone (DESYREL) 50 MG tablet Take 1 tablet by mouth nightly 30 tablet 5    allopurinol (ZYLOPRIM) 300 MG tablet TAKE 1 TABLET BY MOUTH ONE TIME A DAY  90 tablet 3    pilocarpine (SALAGEN) 5 MG tablet TAKE 1 TABLET BY MOUTH THREE TIMES A DAY  90 tablet 3    dicyclomine (BENTYL) 10 MG capsule Take 1 capsule by mouth 4 times daily 120 capsule 3    clopidogrel (PLAVIX) 75 MG tablet Take 1 tablet by mouth daily 90 tablet 3    OnabotulinumtoxinA (BOTOX IJ) Inject as directed Patient is taking for migraines      calcium carbonate (OSCAL) 500 MG TABS tablet Take 500 mg by mouth daily      magnesium 30 MG tablet Take 30 mg by mouth 2 times daily      carvedilol (COREG) 6.25 MG tablet Take 1 tablet by mouth daily 30 tablet 2    Galcanezumab-gnlm (EMGALITY) 120 MG/ML SOAJ Inject into the skin      Multiple Vitamins-Minerals (OCUVITE PRESERVISION PO) Take 1 tablet by mouth daily      Acetaminophen (TYLENOL EXTRA STRENGTH PO) Take by mouth Indications: prn      POTASSIUM GLUCONATE PO Take by mouth daily      pregabalin (LYRICA) 50 MG capsule TAKE 1 CAPSULE BY MOUTH THREE TIMES A DAY 90 capsule 2     No current facility-administered medications on file prior to visit. Review of Systems. Review of Systems:   History obtained from chart review and the patient  General ROS: negative for - chills, fatigue, fever, night sweats or weight gain  Constitutional: Negative for chills, diaphoresis, fatigue, fever and unexpected weight change. Musculoskeletal: Positive for arthralgias, gait problem and joint swelling. Neurological ROS: negative for - behavioral changes, confusion, headaches or seizures. Negative for weakness and numbness. Dermatological ROS: negative for - mole changes, rash  Cardiovascular: Negative for leg swelling. Gastrointestinal: Negative for constipation, diarrhea, nausea and vomiting. Objective:  Dermatologic Exam:  Skin lesion/ulceration Absent . Skin No rashes or nodules noted. .   Skin is thin, with flaky sloughing skin as well as decreased hair growth to the lower leg  Small red hemosiderin deposits seen dorsal foot   Musculoskeletal:     1st MPJ ROM decreased, Bilateral.  Muscle strength 5/5, Bilateral.  Pain present upon palpation of toenails 1-5, Bilateral. decreased medial longitudinal arch, Bilateral.  Ankle ROM decreased,Bilateral.    Dorsally contracted digits present digits 2, Bilateral.     Vascular: DP pulses 1/4 bilateral.  PT pulses 0/4 bilateral.   CFT <5 seconds, Bilateral.  Hair growth absent to the level of the digits, Bilateral.  Edema present, Bilateral.  Varicosities absent, Bilateral. Erythema absent, Bilateral    Neurological: Sensation diminshed to light touch to level of digits, Bilateral.  Protective sensation intact 6/10 sites via 5.07/10g [place] Monofilament, Bilateral.  negative Tinel's, Bilateral.  negative Valleix sign, Bilateral.      Integument: Warm, dry, supple, Bilateral.  Open lesion absent, Bilateral.  Interdigital maceration absent to web spaces 4, Bilateral.  Nails 1-5 left and 1-5 right thickened > 3.0 mm, dystrophic and crumbly, discolored with subungual debris. Fissures absent, Bilateral.   General: AAO x 3 in NAD.     Derm  Toenail Description  Sites of Onychomycosis Involvement (Check affected area)  [x] [x] [x] [x] [x] [x] [x] [x] [x] [x]  5 4 3 2 1 1 2 3 4 5                          Right                                        Left    Thickness  [x] [x] [x] [x] [x] [x] [x] [x] [x] [x]  5 4 3 2 1 1 2 3 4 5                         Right                                        Left    Dystrophic Changes   [x] [x] [x] [x] [x] [x] [x] [x] [x] [x]  5 4 3 2 1 1 2 3 4 5                         Right                                        Left    Color  [x] [x] [x] [x] [x] [x] [x] [x] [x] [x]  5 4 3 2 1 1 2 3 4 5 Right                                        Left    Incurvation/Ingrowin   [] [] [] [] [] [] [] [] [] []  5 4 3 2 1 1 2 3 4 5                         Right                                        Left    Inflammation/Pain   [x] [x] [x] [x] [x] [x] [x] [x] [x] [x]  5 4 3  2 1 1 2 3 4 5                         Right                                        Left       Nails are painful 1-10 with direct palpation. Q7   []Yes  []No                Q8   [x]Yes  []No                     Q9   []Yes    []No  Assessment:  65 y.o. male with:    Diagnosis Orders   1. Dermatophytosis of nail  94977 - MT DEBRIDEMENT OF NAILS, 6 OR MORE   2. Bilateral lower extremity pain  81438 - MT DEBRIDEMENT OF NAILS, 6 OR MORE   3. PVD (peripheral vascular disease) ([facility] [address]  00751 - MT DEBRIDEMENT OF NAILS, 6 OR MORE         Plan:   Pt was evaluated and examined. Patient was given personalized discharge instructions. Nails 1-10 were debrided in length and thickness sharply with a nail nipper and  without incident. Pt will follow up in 9 weeks or sooner if any problems arise. Diagnosis was discussed with the pt and all of their questions were answered in detail. Proper foot hygiene and care was discussed with the pt. Patient to check feet daily and contact the office with any questions/problems/concerns. Other comorbidity noted and will be managed by PCP. Pain waiver discussed with patient and confirmed.    9/23/2021      Electronically signed by Nereida Cordova DPM on 9/23/2021 at 9:11 AM  9/23/2021

## 2021-09-24 ENCOUNTER — OFFICE VISIT (OUTPATIENT)
Dept: UROLOGY | Age: 71
End: 2021-09-24
Payer: COMMERCIAL

## 2021-09-24 VITALS
WEIGHT: 238 LBS | HEIGHT: 69 IN | HEART RATE: 68 BPM | TEMPERATURE: 97.8 F | RESPIRATION RATE: 17 BRPM | SYSTOLIC BLOOD PRESSURE: 123 MMHG | OXYGEN SATURATION: 96 % | DIASTOLIC BLOOD PRESSURE: 79 MMHG | BODY MASS INDEX: 35.25 KG/M2

## 2021-09-24 DIAGNOSIS — N28.1 RENAL CYST: Primary | ICD-10-CM

## 2021-09-24 DIAGNOSIS — R39.15 URINARY URGENCY: ICD-10-CM

## 2021-09-24 DIAGNOSIS — R39.14 FEELING OF INCOMPLETE BLADDER EMPTYING: ICD-10-CM

## 2021-09-24 PROCEDURE — 99214 OFFICE O/P EST MOD 30 MIN: CPT | Performed by: UROLOGY

## 2021-09-24 RX ORDER — TAMSULOSIN HYDROCHLORIDE 0.4 MG/1
0.4 CAPSULE ORAL DAILY
Qty: 30 CAPSULE | Refills: 6 | Status: SHIPPED | OUTPATIENT
Start: 2021-09-24

## 2021-09-24 ASSESSMENT — ENCOUNTER SYMPTOMS
VOMITING: 0
DIARRHEA: 0
WHEEZING: 0
ABDOMINAL PAIN: 0
CONSTIPATION: 0
EYE PAIN: 0
NAUSEA: 0
BACK PAIN: 0
SHORTNESS OF BREATH: 0
COUGH: 0

## 2021-09-24 NOTE — PROGRESS NOTES
[address] [address]  Dept: [facility] [facility] Urology Office Note - Established    Patient:  Christopher Wright  YOB: 1950  Date: 9/24/2021    The patient is a 65 y.o. male who presents todayfor evaluation of the following problems:   Chief Complaint   Patient presents with    Follow-up     6 months us results  at [address] 9/22  patient states he has been having sob        HPI  He is here in follow up for a renal cyst.  MRI showed no enhancement. Ultrasound shows stability. No change in size. He has a good stream, but he does not feel like he empties his bladder. He is not on any meds for his prostate. Summary of old records: N/A    Additional History: N/A    Procedures Today: N/A    Urinalysis today:  No results found for this visit on 09/24/21. Last several PSA's:  No results found for: PSA  Last total testosterone:  No results found for: TESTOSTERONE    AUA Symptom Score (9/24/2021):   INCOMPLETE EMPTYING: How often have you had the sensation of not emptying your bladder?: Not at all  FREQUENCY: How often do you have to urinate less than every two hours?: Not at all  INTERMITTENCY: How often have you found you stopped and started again several times when you urinated?: Not at all  URGENCY: How often have you found it difficult to postpone urination?: Less than Half the time  WEAK STREAM: How often have you had a weak urinary stream?: Not at all  STRAINING: How often have you had to strain to start  urination?: Not at all  NOCTURIA: How many times did you typically get up at night to uriniate?: NONE  TOTAL I-PSS SCORE[de-identified] 2       Last BUN and creatinine:  Lab Results   Component Value Date    BUN 11 03/08/2021     Lab Results   Component Value Date    CREATININE 0.72 03/08/2021       Additional Lab/Culture results: none    Imaging Reviewed during this Office Visit: none  (results were independently reviewed by physician and radiology report verified)    PAST MEDICAL, FAMILY AND SOCIAL HISTORY UPDATE:  Past Medical History:   Diagnosis Date    Anxiety     Duodenitis     Fundic gland polyps of stomach, benign     Hyperlipidemia     Hypertension      Past Surgical History:   Procedure Laterality Date    APPENDECTOMY      BACK SURGERY      CHOLECYSTECTOMY      COLONOSCOPY  3/7/2012    tubular adenoma, hyperplastic polyp    COLONOSCOPY  05/28/2019    JOINT REPLACEMENT      hip    OTHER SURGICAL HISTORY  08/29/2016    Spinal stimulator    SPINAL CORD STIMULATOR SURGERY  08/29/2016    UPPER GASTROINTESTINAL ENDOSCOPY  05/28/2019    UPPER GASTROINTESTINAL ENDOSCOPY  10/15/2020     Family History   Problem Relation Age of Onset    Heart Disease Father     Hypertension Father     Hypertension Brother     Stroke Brother     Tuberculosis Brother      Outpatient Medications Marked as Taking for the 9/24/21 encounter (Office Visit) with Avril Reyna MD   Medication Sig Dispense Refill    tamsulosin (FLOMAX) 0.4 MG capsule Take 1 capsule by mouth daily Take one capsule daily to facilitate passage of ureteral stone 30 capsule 6    lisinopril (PRINIVIL;ZESTRIL) 10 MG tablet TAKE 1 TABLET BY MOUTH ONE TIME A DAY 90 tablet 1    citalopram (CELEXA) 20 MG tablet TAKE 1 TABLET BY MOUTH EVERY DAY 90 tablet 1    topiramate (TOPAMAX) 50 MG tablet TAKE 1 TABLET BY MOUTH TWO TIMES A  tablet 1    omeprazole (PRILOSEC) 20 MG delayed release capsule TAKE 1 CAPSULE BY MOUTH EVERY DAY 90 capsule 1    vitamin D (ERGOCALCIFEROL) 1.25 MG (83814 UT) CAPS capsule Take 1 capsule by mouth once a week 24 capsule 1    atorvastatin (LIPITOR) 10 MG tablet TAKE 1 TABLET BY MOUTH ONE TIME A DAY 90 tablet 3    budesonide (PULMICORT) 0.5 MG/2ML nebulizer suspension USE 2 VIALS AS DIRECTED IN A SINUS RINSE BOTTLE PER DAY      vitamin B-1 (THIAMINE) 100 MG tablet Take 100 mg by mouth daily      traZODone (DESYREL) 50 MG tablet Take 1 tablet by mouth nightly 30 tablet 5    allopurinol (ZYLOPRIM) 300 MG tablet TAKE 1 TABLET BY MOUTH ONE TIME A DAY  90 tablet 3    pilocarpine (SALAGEN) 5 MG tablet TAKE 1 TABLET BY MOUTH THREE TIMES A DAY  90 tablet 3    dicyclomine (BENTYL) 10 MG capsule Take 1 capsule by mouth 4 times daily 120 capsule 3    clopidogrel (PLAVIX) 75 MG tablet Take 1 tablet by mouth daily 90 tablet 3    OnabotulinumtoxinA (BOTOX IJ) Inject as directed Patient is taking for migraines      calcium carbonate (OSCAL) 500 MG TABS tablet Take 500 mg by mouth daily      magnesium 30 MG tablet Take 30 mg by mouth 2 times daily      carvedilol (COREG) 6.25 MG tablet Take 1 tablet by mouth daily 30 tablet 2    Galcanezumab-gnlm (EMGALITY) 120 MG/ML SOAJ Inject into the skin      Multiple Vitamins-Minerals (OCUVITE PRESERVISION PO) Take 1 tablet by mouth daily      Acetaminophen (TYLENOL EXTRA STRENGTH PO) Take by mouth Indications: prn      POTASSIUM GLUCONATE PO Take by mouth daily         Cupric oxide, Chrome alum, Cobalt, Copper-containing compounds, and Nickel  Social History     Tobacco Use   Smoking Status Former Smoker    Packs/day: 2.00    Years: 35.00    Pack years: 70.00    Types: Cigarettes    Quit date: 11/12/2005    Years since quitting: 15.8   Smokeless Tobacco Former User    Types: Chew     (Ifpatient a smoker, smoking cessation counseling offered)    Social History     Substance and Sexual Activity   Alcohol Use Yes    Alcohol/week: 35.0 - 42.0 standard drinks    Types: 35 - 42 Cans of beer per week    Comment: 4-5 beers a day       REVIEW OF SYSTEMS:  Review of Systems    Physical Exam:      Vitals:    09/24/21 0829   BP: 123/79   Pulse: 68   Resp: 17   Temp: 97.8 °F (36.6 °C)   SpO2: 96%     Body mass index is 35.15 kg/m². Patient is a 65 y.o. male in no acute distress and alert and oriented to person, place and time. Physical Exam  Constitutional: Patient in no acute distress.   Neuro: Alert and oriented to person, place and time. Psych: Mood normal, affect normal  Lungs: Respiratory effort is normal  Cardiovascular: Warm & Pink  Abdomen: Soft, non-tender, non-distended with no CVA,  No flank tenderness,  Or hepatosplenomegaly   Lymphatics: No palpablelymphadenopathy. Bladder non-tender and not distended. Musculoskeletal: Normal gait and station      Assessment and Plan      1. Renal cyst    2. Feeling of incomplete bladder emptying    3. Urinary urgency           Plan:          Renal cyst is simple. No further follow up needed. Will start Flomax. F/U in 3 months. Return in about 3 months (around 12/24/2021). Prescriptions Ordered:  Orders Placed This Encounter   Medications    tamsulosin (FLOMAX) 0.4 MG capsule     Sig: Take 1 capsule by mouth daily Take one capsule daily to facilitate passage of ureteral stone     Dispense:  30 capsule     Refill:  6     Orders Placed:  No orders of the defined types were placed in this encounter. Anna Braga MD    Agree with the ROS entered by the MA.

## 2021-09-24 NOTE — LETTER
[address] [address]  Dept: [phone]  Dept Fax: [phone]        9/24/21    Patient: Santiago Rebolledo  YOB: 1950    Dear Avila Duque PA-C,    I had the pleasure of seeing one of your patients, Kira Velasquez today in the office today. Below are the relevant portions of my assessment and plan of care. IMPRESSION:  1. Renal cyst    2. Feeling of incomplete bladder emptying    3. Urinary urgency        PLAN:  Renal cyst is simple. No further follow up needed. Will start Flomax. F/U in 3 months. Return in about 3 months (around 12/24/2021). Prescriptions Ordered:  Orders Placed This Encounter   Medications    tamsulosin (FLOMAX) 0.4 MG capsule     Sig: Take 1 capsule by mouth daily Take one capsule daily to facilitate passage of ureteral stone     Dispense:  30 capsule     Refill:  6     Orders Placed:  No orders of the defined types were placed in this encounter. Thank you for allowing me to participate in the care of this patient. I will keep you updated on this patient's follow up and I look forward to serving you and your patients again in the future.         Bk Bowden MD

## 2021-09-24 NOTE — PROGRESS NOTES
Review of Systems   Constitutional: Positive for appetite change. Negative for chills, fatigue and fever. Eyes: Negative for pain and visual disturbance. Respiratory: Negative for cough, shortness of breath and wheezing. Cardiovascular: Negative for chest pain and leg swelling. Gastrointestinal: Negative for abdominal pain, constipation, diarrhea, nausea and vomiting. Genitourinary: Positive for frequency and urgency. Negative for difficulty urinating, dysuria, hematuria, penile pain and testicular pain. Musculoskeletal: Negative for back pain and myalgias. Neurological: Positive for headaches. Negative for dizziness, tremors, weakness, light-headedness and numbness. Hematological: Negative for adenopathy. Does not bruise/bleed easily.

## 2021-09-28 ENCOUNTER — OFFICE VISIT (OUTPATIENT)
Dept: PRIMARY CARE CLINIC | Age: 71
End: 2021-09-28
Payer: COMMERCIAL

## 2021-09-28 VITALS
BODY MASS INDEX: 35.16 KG/M2 | OXYGEN SATURATION: 97 % | SYSTOLIC BLOOD PRESSURE: 124 MMHG | HEART RATE: 71 BPM | WEIGHT: 237.4 LBS | DIASTOLIC BLOOD PRESSURE: 82 MMHG | HEIGHT: 69 IN

## 2021-09-28 DIAGNOSIS — R06.02 SHORTNESS OF BREATH: Primary | ICD-10-CM

## 2021-09-28 DIAGNOSIS — F41.9 ANXIETY: ICD-10-CM

## 2021-09-28 DIAGNOSIS — R91.1 PULMONARY NODULE: ICD-10-CM

## 2021-09-28 DIAGNOSIS — J47.9 BRONCHIECTASIS WITHOUT COMPLICATION (HCC): ICD-10-CM

## 2021-09-28 PROCEDURE — 99214 OFFICE O/P EST MOD 30 MIN: CPT | Performed by: PHYSICIAN ASSISTANT

## 2021-09-28 RX ORDER — CITALOPRAM 40 MG/1
TABLET ORAL
Qty: 90 TABLET | Refills: 1 | Status: SHIPPED | OUTPATIENT
Start: 2021-09-28 | End: 2022-03-08 | Stop reason: SDUPTHER

## 2021-09-28 ASSESSMENT — ENCOUNTER SYMPTOMS
SHORTNESS OF BREATH: 1
CHEST TIGHTNESS: 1
VOMITING: 0
RHINORRHEA: 1
NAUSEA: 0

## 2021-09-28 NOTE — PROGRESS NOTES
[facility] PRIMARY CARE  [address] [facility] [address]  Dept: [address] Cespedes is a 65 y.o. male who presents today for his medical conditions/complaints as noted below. Chief Complaint   Patient presents with    Medication Check     pt states that the celexa is helping    Discuss Labs     pt wants to discuss ultrasound, ct and lab work that was done       HPI:     HPI  Labs: Cholesteral and LFTs are normal  CT of chest shows stable lung nodules and mild bronchiectesis and wall thickening.     Renal US: renal cyst Bosniak category 2    Celexa is helping 30 %      LDL Calculated (mg/dL)   Date Value   09/02/2021 75   10/23/2019 83   09/19/2017 84       (goal LDL is <100)   BUN (mg/dL)   Date Value   03/08/2021 11     BP Readings from Last 3 Encounters:   09/28/21 124/82   09/24/21 123/79   08/03/21 124/78          (goal 120/80)    Past Medical History:   Diagnosis Date    Anxiety     Duodenitis     Fundic gland polyps of stomach, benign     Hyperlipidemia     Hypertension       Past Surgical History:   Procedure Laterality Date    APPENDECTOMY      BACK SURGERY      CHOLECYSTECTOMY      COLONOSCOPY  3/7/2012    tubular adenoma, hyperplastic polyp    COLONOSCOPY  05/28/2019    JOINT REPLACEMENT      hip    OTHER SURGICAL HISTORY  08/29/2016    Spinal stimulator    SPINAL CORD STIMULATOR SURGERY  08/29/2016    UPPER GASTROINTESTINAL ENDOSCOPY  05/28/2019    UPPER GASTROINTESTINAL ENDOSCOPY  10/15/2020       Family History   Problem Relation Age of Onset    Heart Disease Father     Hypertension Father     Hypertension Brother     Stroke Brother     Tuberculosis Brother        Social History     Tobacco Use    Smoking status: Former Smoker     Packs/day: 2.00     Years: 35.00     Pack years: 70.00     Types: Cigarettes     Quit date: 11/12/2005     Years since quitting: 15.8    Smokeless tobacco: Former User     Types: [address] [place] [address] Substance Use Topics    Alcohol use:  Yes     Alcohol/week: 35.0 - 42.0 standard drinks     Types: 35 - 42 Cans of beer per week     Comment: 4-5 beers a day      Current Outpatient Medications   Medication Sig Dispense Refill    citalopram (CELEXA) 40 MG tablet TAKE 1 TABLET BY MOUTH EVERY DAY 90 tablet 1    tamsulosin (FLOMAX) 0.4 MG capsule Take 1 capsule by mouth daily Take one capsule daily to facilitate passage of ureteral stone 30 capsule 6    lisinopril (PRINIVIL;ZESTRIL) 10 MG tablet TAKE 1 TABLET BY MOUTH ONE TIME A DAY 90 tablet 1    topiramate (TOPAMAX) 50 MG tablet TAKE 1 TABLET BY MOUTH TWO TIMES A  tablet 1    omeprazole (PRILOSEC) 20 MG delayed release capsule TAKE 1 CAPSULE BY MOUTH EVERY DAY 90 capsule 1    vitamin D (ERGOCALCIFEROL) 1.25 MG (17348 UT) CAPS capsule Take 1 capsule by mouth once a week 24 capsule 1    atorvastatin (LIPITOR) 10 MG tablet TAKE 1 TABLET BY MOUTH ONE TIME A DAY 90 tablet 3    pregabalin (LYRICA) 50 MG capsule TAKE 1 CAPSULE BY MOUTH THREE TIMES A DAY 90 capsule 2    budesonide (PULMICORT) 0.5 MG/2ML nebulizer suspension USE 2 VIALS AS DIRECTED IN A SINUS RINSE BOTTLE PER DAY      vitamin B-1 (THIAMINE) 100 MG tablet Take 100 mg by mouth daily      traZODone (DESYREL) 50 MG tablet Take 1 tablet by mouth nightly 30 tablet 5    allopurinol (ZYLOPRIM) 300 MG tablet TAKE 1 TABLET BY MOUTH ONE TIME A DAY  90 tablet 3    pilocarpine (SALAGEN) 5 MG tablet TAKE 1 TABLET BY MOUTH THREE TIMES A DAY  90 tablet 3    dicyclomine (BENTYL) 10 MG capsule Take 1 capsule by mouth 4 times daily 120 capsule 3    clopidogrel (PLAVIX) 75 MG tablet Take 1 tablet by mouth daily 90 tablet 3    OnabotulinumtoxinA (BOTOX IJ) Inject as directed Patient is taking for migraines      calcium carbonate (OSCAL) 500 MG TABS tablet Take 500 mg by mouth daily      magnesium 30 MG tablet Take 30 mg by mouth 2 times daily      carvedilol (COREG) 6.25 MG tablet Take 1 tablet by mouth daily 30 tablet 2    Galcanezumab-gnlm (EMGALITY) 120 MG/ML SOAJ Inject into the skin      Multiple Vitamins-Minerals (OCUVITE PRESERVISION PO) Take 1 tablet by mouth daily      Acetaminophen (TYLENOL EXTRA STRENGTH PO) Take by mouth Indications: prn      POTASSIUM GLUCONATE PO Take by mouth daily       No current facility-administered medications for this visit. Allergies   Allergen Reactions    Cupric Oxide     Chrome Alum     Cobalt     Copper-Containing Compounds     Nickel        Health Maintenance   Topic Date Due    Shingles Vaccine (2 of 2) 11/03/2021    Potassium monitoring  03/08/2022    Creatinine monitoring  03/08/2022    Annual Wellness Visit (AWV)  08/04/2022    Lipid screen  09/02/2022    Colon cancer screen colonoscopy  05/28/2029    DTaP/Tdap/Td vaccine (2 - Td or Tdap) 10/01/2030    Flu vaccine  Completed    Pneumococcal 65+ years Vaccine  Completed    COVID-19 Vaccine  Completed    AAA screen  Completed    Hepatitis C screen  Completed    Hepatitis A vaccine  Aged Out    Hepatitis B vaccine  Aged Out    Hib vaccine  Aged Out    Meningococcal (ACWY) vaccine  Aged Out       Subjective:      Review of Systems   Constitutional: Negative for chills, diaphoresis and fever. HENT: Positive for ear pain and rhinorrhea. Respiratory: Positive for chest tightness (with exertion) and shortness of breath. Gastrointestinal: Negative for nausea and vomiting. Allergic/Immunologic: Positive for environmental allergies. Neurological: Positive for headaches. Objective:     /82   Pulse 71   Ht 5' 9\" (1.753 m)   Wt 237 lb 6.4 oz (107.7 kg)   SpO2 97%   BMI 35.06 kg/m²   Physical Exam  Vitals and nursing note reviewed. Constitutional:       Appearance: Normal appearance. He is obese. Cardiovascular:      Rate and Rhythm: Normal rate and regular rhythm. Heart sounds: Normal heart sounds.    Pulmonary:      Effort: Pulmonary effort is normal. Breath sounds: Normal breath sounds. Musculoskeletal:      Right lower leg: No edema. Left lower leg: No edema. Neurological:      Mental Status: He is alert and oriented to person, place, and time. Psychiatric:         Mood and Affect: Mood normal.         Behavior: Behavior normal.         Thought Content: Thought content normal.         Judgment: Judgment normal.         Assessment:       Diagnosis Orders   1. Shortness of breath  EVERT Alves MD, Pulmonology, [place]   2. Bronchiectasis without complication ([address]  EVERT Alves MD, Pulmonology, [place]   3. Pulmonary nodule  EVERT Alves MD, Pulmonology, [place]   4. Anxiety  citalopram (CELEXA) 40 MG tablet        Plan:    Referral to pulmonary   Follow up with Dr. Eugene Lunsford   REviewed imaging  Increased Celexa    Return in about 3 months (around 12/28/2021) for recheck--1/2 hour. Orders Placed This Encounter   Procedures   Moris Booker MD, Pulmonology, [place]     Referral Priority:   Routine     Referral Type:   Eval and Treat     Referral Reason:   Specialty Services Required     Referred to Provider:   Khari Zeng MD     Requested Specialty:   Pulmonology     Number of Visits Requested:   1     Orders Placed This Encounter   Medications    citalopram (CELEXA) 40 MG tablet     Sig: TAKE 1 TABLET BY MOUTH EVERY DAY     Dispense:  90 tablet     Refill:  1       Patient given educational materials - see patient instructions. Discussed use, benefit, and side effects of prescribed medications. All patient questions answered. Pt voiced understanding. Reviewed health maintenance. Instructed to continue current medications, diet and exercise. Patient agreed with treatment plan. Follow up as directed.      Electronically signed by Thad Ortega PA-C on 9/28/2021 at 10:20 AM

## 2021-10-21 ENCOUNTER — OFFICE VISIT (OUTPATIENT)
Dept: PRIMARY CARE CLINIC | Age: 71
End: 2021-10-21
Payer: COMMERCIAL

## 2021-10-21 VITALS
HEART RATE: 56 BPM | DIASTOLIC BLOOD PRESSURE: 80 MMHG | BODY MASS INDEX: 35.59 KG/M2 | SYSTOLIC BLOOD PRESSURE: 126 MMHG | WEIGHT: 241 LBS | OXYGEN SATURATION: 94 %

## 2021-10-21 DIAGNOSIS — F41.9 ANXIETY: ICD-10-CM

## 2021-10-21 DIAGNOSIS — I10 ESSENTIAL HYPERTENSION: Primary | ICD-10-CM

## 2021-10-21 DIAGNOSIS — Z11.52 ENCOUNTER FOR SCREENING FOR COVID-19: ICD-10-CM

## 2021-10-21 PROBLEM — I20.0 UNSTABLE ANGINA (HCC): Status: ACTIVE | Noted: 2021-10-20

## 2021-10-21 PROCEDURE — 99213 OFFICE O/P EST LOW 20 MIN: CPT | Performed by: PHYSICIAN ASSISTANT

## 2021-10-21 RX ORDER — LORAZEPAM 0.5 MG/1
TABLET ORAL
Qty: 30 TABLET | Refills: 2 | Status: SHIPPED | OUTPATIENT
Start: 2021-10-21 | End: 2021-10-21

## 2021-10-21 RX ORDER — CARVEDILOL 6.25 MG/1
6.25 TABLET ORAL 2 TIMES DAILY
Qty: 30 TABLET | Refills: 2
Start: 2021-10-21 | End: 2022-03-21 | Stop reason: SDUPTHER

## 2021-10-21 RX ORDER — ATORVASTATIN CALCIUM 40 MG/1
TABLET, FILM COATED ORAL
Qty: 30 TABLET | Refills: 0 | Status: SHIPPED
Start: 2021-10-21 | End: 2022-03-08 | Stop reason: DRUGHIGH

## 2021-10-21 RX ORDER — NITROGLYCERIN 0.4 MG/1
0.4 TABLET SUBLINGUAL EVERY 5 MIN PRN
Qty: 25 TABLET | Refills: 3
Start: 2021-10-21

## 2021-10-21 ASSESSMENT — ENCOUNTER SYMPTOMS
RHINORRHEA: 0
EYE REDNESS: 0
SHORTNESS OF BREATH: 1
COUGH: 0
NAUSEA: 0
VOMITING: 0
ABDOMINAL PAIN: 0
EYE DISCHARGE: 0
WHEEZING: 0
DIARRHEA: 0
SORE THROAT: 0

## 2021-10-21 NOTE — PROGRESS NOTES
[facility] PRIMARY CARE  [address]  [facility] [address]  [address]pt: [address] is a 65 y.o. male who presents today for his medical conditions/complaints as noted below. Chief Complaint   Patient presents with    Medication Check    Other     COVID swab for surgery       HPI:     HPI  Wants a refill on Ativan. Uses every 3rd day for anxiety. Needs Covid swab for upcoming cardiac cath at [address] 10/25/21    Cardiology increased his Lipitor to 40mg. LDL Calculated (mg/dL)   Date Value   09/02/2021 75   10/23/2019 83   09/19/2017 84       (goal LDL is <100)   BUN (mg/dL)   Date Value   03/08/2021 11     BP Readings from Last 3 Encounters:   10/21/21 126/80   09/28/21 124/82   09/24/21 123/79          (goal 120/80)    Past Medical History:   Diagnosis Date    Anxiety     Duodenitis     Fundic gland polyps of stomach, benign     Hyperlipidemia     Hypertension       Past Surgical History:   Procedure Laterality Date    APPENDECTOMY      BACK SURGERY      CHOLECYSTECTOMY      COLONOSCOPY  3/7/2012    tubular adenoma, hyperplastic polyp    COLONOSCOPY  05/28/2019    JOINT REPLACEMENT      hip    OTHER SURGICAL HISTORY  08/29/2016    Spinal stimulator    SPINAL CORD STIMULATOR SURGERY  08/29/2016    UPPER GASTROINTESTINAL ENDOSCOPY  05/28/2019    UPPER GASTROINTESTINAL ENDOSCOPY  10/15/2020       Family History   Problem Relation Age of Onset    Heart Disease Father     Hypertension Father     Hypertension Brother     Stroke Brother     Tuberculosis Brother        Social History     Tobacco Use    Smoking status: Former Smoker     Packs/day: 2.00     Years: 35.00     Pack years: 70.00     Types: Cigarettes     Quit date: 11/12/2005     Years since quitting: 15.9    Smokeless tobacco: Former User     Types: Chew   Substance Use Topics    Alcohol use:  Yes     Alcohol/week: 35.0 - 42.0 standard drinks     Types: 35 - 42 Cans of beer per week     Comment: 4-5 beers a day      Current Outpatient Medications   Medication Sig Dispense Refill    atorvastatin (LIPITOR) 40 MG tablet TAKE 1 TABLET BY MOUTH ONE TIME A DAY 30 tablet 0    nitroGLYCERIN (NITROSTAT) 0.4 MG SL tablet Place 1 tablet under the tongue every 5 minutes as needed for Chest pain up to max of 3 total doses.  If no relief after 1 dose, call 911. 25 tablet 3    carvedilol (COREG) 6.25 MG tablet Take 1 tablet by mouth 2 times daily 30 tablet 2    LORazepam (ATIVAN) 0.5 MG tablet TAKE 1 TABLET BY MOUTH EVERY SIX HOURS AS NEEDED FOR ANXIETY 30 tablet 2    citalopram (CELEXA) 40 MG tablet TAKE 1 TABLET BY MOUTH EVERY DAY 90 tablet 1    tamsulosin (FLOMAX) 0.4 MG capsule Take 1 capsule by mouth daily Take one capsule daily to facilitate passage of ureteral stone 30 capsule 6    lisinopril (PRINIVIL;ZESTRIL) 10 MG tablet TAKE 1 TABLET BY MOUTH ONE TIME A DAY 90 tablet 1    topiramate (TOPAMAX) 50 MG tablet TAKE 1 TABLET BY MOUTH TWO TIMES A  tablet 1    omeprazole (PRILOSEC) 20 MG delayed release capsule TAKE 1 CAPSULE BY MOUTH EVERY DAY 90 capsule 1    vitamin D (ERGOCALCIFEROL) 1.25 MG (21777 UT) CAPS capsule Take 1 capsule by mouth once a week 24 capsule 1    budesonide (PULMICORT) 0.5 MG/2ML nebulizer suspension USE 2 VIALS AS DIRECTED IN A SINUS RINSE BOTTLE PER DAY      vitamin B-1 (THIAMINE) 100 MG tablet Take 100 mg by mouth daily      traZODone (DESYREL) 50 MG tablet Take 1 tablet by mouth nightly 30 tablet 5    allopurinol (ZYLOPRIM) 300 MG tablet TAKE 1 TABLET BY MOUTH ONE TIME A DAY  90 tablet 3    pilocarpine (SALAGEN) 5 MG tablet TAKE 1 TABLET BY MOUTH THREE TIMES A DAY  90 tablet 3    dicyclomine (BENTYL) 10 MG capsule Take 1 capsule by mouth 4 times daily 120 capsule 3    clopidogrel (PLAVIX) 75 MG tablet Take 1 tablet by mouth daily 90 tablet 3    OnabotulinumtoxinA (BOTOX IJ) Inject as directed Patient is taking for migraines      calcium carbonate (OSCAL) 500 MG TABS tablet Take 500 mg by mouth daily      magnesium 30 MG tablet Take 30 mg by mouth 2 times daily      Galcanezumab-gnlm (EMGALITY) 120 MG/ML SOAJ Inject into the skin      Multiple Vitamins-Minerals (OCUVITE PRESERVISION PO) Take 1 tablet by mouth daily      Acetaminophen (TYLENOL EXTRA STRENGTH PO) Take by mouth Indications: prn      POTASSIUM GLUCONATE PO Take by mouth daily      pregabalin (LYRICA) 50 MG capsule TAKE 1 CAPSULE BY MOUTH THREE TIMES A DAY 90 capsule 2     No current facility-administered medications for this visit. Allergies   Allergen Reactions    Cupric Oxide     Chrome Alum     Cobalt     Copper-Containing Compounds     Nickel        Health Maintenance   Topic Date Due    Shingles Vaccine (2 of 2) 11/03/2021    Potassium monitoring  03/08/2022    Creatinine monitoring  03/08/2022    Annual Wellness Visit (AWV)  08/04/2022    Lipid screen  09/02/2022    Colon cancer screen colonoscopy  05/28/2029    DTaP/Tdap/Td vaccine (2 - Td or Tdap) 10/01/2030    Flu vaccine  Completed    Pneumococcal 65+ years Vaccine  Completed    COVID-19 Vaccine  Completed    AAA screen  Completed    Hepatitis C screen  Completed    Hepatitis A vaccine  Aged Out    Hepatitis B vaccine  Aged Out    Hib vaccine  Aged Out    Meningococcal (ACWY) vaccine  Aged Out       Subjective:      Review of Systems   Constitutional: Negative for chills and fever. HENT: Negative for rhinorrhea and sore throat. Eyes: Negative for discharge and redness. Respiratory: Positive for shortness of breath. Negative for cough and wheezing. Cardiovascular: Negative for chest pain and palpitations. Gastrointestinal: Negative for abdominal pain, diarrhea, nausea and vomiting. Genitourinary: Negative for dysuria and frequency. Musculoskeletal: Negative for arthralgias and myalgias. Neurological: Negative for dizziness, light-headedness and headaches. Psychiatric/Behavioral: Negative for sleep disturbance. The patient is nervous/anxious. Objective:     /80   Pulse 56   Wt 241 lb (109.3 kg)   SpO2 94%   BMI 35.59 kg/m²   Physical Exam  Vitals and nursing note reviewed. Constitutional:       Appearance: Normal appearance. Cardiovascular:      Rate and Rhythm: Normal rate and regular rhythm. Heart sounds: Normal heart sounds. Pulmonary:      Effort: Pulmonary effort is normal.      Breath sounds: No wheezing, rhonchi or rales. Musculoskeletal:      Right lower leg: No edema. Left lower leg: No edema. Neurological:      Mental Status: He is alert and oriented to person, place, and time. Psychiatric:         Mood and Affect: Mood normal.         Assessment:       Diagnosis Orders   1. Essential hypertension  carvedilol (COREG) 6.25 MG tablet   2. Anxiety  LORazepam (ATIVAN) 0.5 MG tablet   3. Encounter for screening for COVID-19  COVID-19    COVID-19        Plan:    Handicap placard today x 2, lifetime  Refill on Ativan sent   Med contract  UDS  Covid Swab  Controlled Substance Monitoring:    Acute and Chronic Pain Monitoring:   RX Monitoring 10/21/2021   Attestation -   Periodic Controlled Substance Monitoring No signs of potential drug abuse or diversion identified. ;Assessed functional status. ;Random urine drug screen sent today. No follow-ups on file. Orders Placed This Encounter   Procedures    COVID-19     Standing Status:   Future     Number of Occurrences:   1     Standing Expiration Date:   10/21/2022     Scheduling Instructions:      1) Due to current limited availability of the COVID-19 test, tests will be prioritized based on responses to questions above. Testing may be delayed due to volume.             2) Print and instruct patient to adhere to [facility] home isolation program. (Link Above)              3) Set up or refer patient for a monitoring program.              4) Have patient sign up for and leverage MyChart (if not previously done). Order Specific Question:   Is this test for diagnosis or screening? Answer:   Screening     Order Specific Question:   Symptomatic for COVID-19 as defined by CDC? Answer:   No     Order Specific Question:   Date of Symptom Onset     Answer:   N/A     Order Specific Question:   Hospitalized for COVID-19? Answer:   No     Order Specific Question:   Admitted to ICU for COVID-19? Answer:   No     Order Specific Question:   Employed in healthcare setting? Answer:   No     Order Specific Question:   Resident in a congregate (group) care setting? Answer:   No     Order Specific Question:   Pregnant: Answer:   No     Order Specific Question:   Previously tested for COVID-19? Answer:   Yes     Orders Placed This Encounter   Medications    atorvastatin (LIPITOR) 40 MG tablet     Sig: TAKE 1 TABLET BY MOUTH ONE TIME A DAY     Dispense:  30 tablet     Refill:  0    nitroGLYCERIN (NITROSTAT) 0.4 MG SL tablet     Sig: Place 1 tablet under the tongue every 5 minutes as needed for Chest pain up to max of 3 total doses. If no relief after 1 dose, call 911. Dispense:  25 tablet     Refill:  3    carvedilol (COREG) 6.25 MG tablet     Sig: Take 1 tablet by mouth 2 times daily     Dispense:  30 tablet     Refill:  2    LORazepam (ATIVAN) 0.5 MG tablet     Sig: TAKE 1 TABLET BY MOUTH EVERY SIX HOURS AS NEEDED FOR ANXIETY     Dispense:  30 tablet     Refill:  2       Patient given educational materials - see patient instructions. Discussed use, benefit, and side effects of prescribed medications. All patient questions answered. Pt voiced understanding. Reviewed health maintenance. Instructed to continue current medications, diet and exercise. Patient agreed with treatment plan. Follow up as directed.      Electronically signed by Kyrie Momin PA-C on 10/21/2021 at 5:08 PM

## 2021-10-22 DIAGNOSIS — F41.9 ANXIETY: Primary | ICD-10-CM

## 2021-10-22 LAB
6-ACETYLMORPHINE, UR: NORMAL
AMPHETAMINE SCREEN, URINE: NORMAL
BARBITURATE SCREEN, URINE: NORMAL
BENZODIAZEPINE SCREEN, URINE: NORMAL
CANNABINOID SCREEN URINE: NORMAL
COCAINE METABOLITE, URINE: NORMAL
CREATININE URINE: NORMAL
EDDP, URINE: NORMAL
ETHANOL URINE: NORMAL
MDMA URINE: NORMAL
METHADONE SCREEN, URINE: NORMAL
METHAMPHETAMINE, URINE: NORMAL
OPIATES, URINE: NORMAL
OXYCODONE: NORMAL
PCP: NORMAL
PH, URINE: NORMAL
PHENCYCLIDINE, URINE: NORMAL
PROPOXYPHENE, URINE: NORMAL
TRICYCLIC ANTIDEPRESSANTS, UR: NORMAL

## 2021-10-25 ENCOUNTER — TELEPHONE (OUTPATIENT)
Dept: PRIMARY CARE CLINIC | Age: 71
End: 2021-10-25

## 2021-10-25 NOTE — TELEPHONE ENCOUNTER
----- Message from Geetha Mcclellan sent at 10/25/2021  7:49 AM EDT -----  Subject: Message to Provider    QUESTIONS  Information for Provider? Patient's wife is calling in today for his covid   results that he got done Friday 10/22/2021. I called the clinical staff   and spoke with Brian Rosas who stated that they are not in yet. He is waiting   for a heart catheter and needs the results right away.   ---------------------------------------------------------------------------  --------------  CALL BACK INFO  What is the best way for the office to contact you? OK to leave message on   voicemail  Preferred Call Back Phone Number? [phone]  ---------------------------------------------------------------------------  --------------  SCRIPT ANSWERS  Relationship to Patient? Third Party  Representative Name?  Dangelo Velazquez

## 2021-11-16 DIAGNOSIS — E78.2 MIXED HYPERLIPIDEMIA: ICD-10-CM

## 2021-11-16 DIAGNOSIS — Z13.1 SCREENING FOR DIABETES MELLITUS: ICD-10-CM

## 2021-11-16 DIAGNOSIS — I10 ESSENTIAL HYPERTENSION: ICD-10-CM

## 2021-11-16 DIAGNOSIS — I73.9 PAD (PERIPHERAL ARTERY DISEASE) (HCC): ICD-10-CM

## 2021-11-16 DIAGNOSIS — F41.9 ANXIETY: ICD-10-CM

## 2021-12-06 ENCOUNTER — OFFICE VISIT (OUTPATIENT)
Dept: PRIMARY CARE CLINIC | Age: 71
End: 2021-12-06
Payer: COMMERCIAL

## 2021-12-06 VITALS
WEIGHT: 242 LBS | DIASTOLIC BLOOD PRESSURE: 78 MMHG | SYSTOLIC BLOOD PRESSURE: 124 MMHG | HEART RATE: 56 BPM | OXYGEN SATURATION: 98 % | BODY MASS INDEX: 35.84 KG/M2 | HEIGHT: 69 IN

## 2021-12-06 DIAGNOSIS — I10 ESSENTIAL HYPERTENSION: Primary | ICD-10-CM

## 2021-12-06 DIAGNOSIS — E66.01 SEVERE OBESITY (BMI 35.0-39.9) WITH COMORBIDITY (HCC): ICD-10-CM

## 2021-12-06 DIAGNOSIS — E78.2 MIXED HYPERLIPIDEMIA: ICD-10-CM

## 2021-12-06 DIAGNOSIS — I25.10 CORONARY ARTERY DISEASE INVOLVING NATIVE CORONARY ARTERY OF NATIVE HEART WITHOUT ANGINA PECTORIS: ICD-10-CM

## 2021-12-06 DIAGNOSIS — K58.0 IRRITABLE BOWEL SYNDROME WITH DIARRHEA: ICD-10-CM

## 2021-12-06 DIAGNOSIS — R07.81 RIB PAIN ON RIGHT SIDE: ICD-10-CM

## 2021-12-06 PROCEDURE — 99214 OFFICE O/P EST MOD 30 MIN: CPT | Performed by: PHYSICIAN ASSISTANT

## 2021-12-06 RX ORDER — IPRATROPIUM BROMIDE 42 UG/1
SPRAY, METERED NASAL
COMMUNITY
Start: 2021-11-20

## 2021-12-06 RX ORDER — LORAZEPAM 0.5 MG/1
TABLET ORAL
COMMUNITY
Start: 2021-10-21 | End: 2022-04-12 | Stop reason: SDUPTHER

## 2021-12-06 RX ORDER — ISOSORBIDE MONONITRATE 30 MG/1
TABLET, EXTENDED RELEASE ORAL
COMMUNITY
Start: 2021-11-18

## 2021-12-06 ASSESSMENT — ENCOUNTER SYMPTOMS
CHEST TIGHTNESS: 0
COLOR CHANGE: 0
SHORTNESS OF BREATH: 0
ABDOMINAL PAIN: 0
APNEA: 0
COUGH: 0

## 2021-12-06 NOTE — PROGRESS NOTES
[facility] PRIMARY CARE  [address] [facility] [address]  [address]: [address] is a 65 y.o. male who presents today for his medical conditions/complaints as noted below. Chief Complaint   Patient presents with    6 Month Follow-Up     6 month HTN check up. patient said he does not check BP.       HPI:     HPI  Has seen cardiology and had a cardiac cath which revealed CAD and started Imdur and suggested walking program.    Pain in [address] area for several months. Has seen GI and not GI. LDL Calculated (mg/dL)   Date Value   2021 75   10/23/2019 83   2017 84       (goal LDL is <100)   BUN (mg/dL)   Date Value   2021 11     BP Readings from Last 3 Encounters:   21 124/78   10/21/21 126/80   21 124/82          (goal 120/80)    Past Medical History:   Diagnosis Date    Anxiety     Duodenitis     Fundic gland polyps of stomach, benign     Hyperlipidemia     Hypertension       Past Surgical History:   Procedure Laterality Date    APPENDECTOMY      BACK SURGERY      CHOLECYSTECTOMY      COLONOSCOPY  3/7/2012    tubular adenoma, hyperplastic polyp    COLONOSCOPY  2019    JOINT REPLACEMENT      hip    OTHER SURGICAL HISTORY  2016    Spinal stimulator    SPINAL CORD STIMULATOR SURGERY  2016    UPPER GASTROINTESTINAL ENDOSCOPY  2019    UPPER GASTROINTESTINAL ENDOSCOPY  10/15/2020       Family History   Problem Relation Age of Onset    Heart Disease Father     Hypertension Father     Hypertension Brother     Stroke Brother     Tuberculosis Brother        Social History     Tobacco Use    Smoking status: Former Smoker     Packs/day: 2.00     Years: 35.00     Pack years: 70.00     Types: Cigarettes     Quit date: 2005     Years since quittin.0    Smokeless tobacco: Former User     Types: Chew   Substance Use Topics    Alcohol use:  Yes     Alcohol/week: 35.0 - 42.0 standard drinks     Types: 35 - 42 Cans of beer per week     Comment: 4-5 beers a day      Current Outpatient Medications   Medication Sig Dispense Refill    ipratropium (ATROVENT) 0.06 % nasal spray INSTILL 2 SPRAYS INTO EACH NOSTRIL UP TO FOUR TIMES A DAY      isosorbide mononitrate (IMDUR) 30 MG extended release tablet TAKE 1 TABLET BY MOUTH EVERY DAY      LORazepam (ATIVAN) 0.5 MG tablet TAKE 1 TABLET BY MOUTH EVERY SIX HOURS AS NEEDED FOR ANXIETY      pilocarpine (SALAGEN) 5 MG tablet TAKE 1 TABLET BY MOUTH THREE TIMES A DAY 90 tablet 3    atorvastatin (LIPITOR) 40 MG tablet TAKE 1 TABLET BY MOUTH ONE TIME A DAY 30 tablet 0    nitroGLYCERIN (NITROSTAT) 0.4 MG SL tablet Place 1 tablet under the tongue every 5 minutes as needed for Chest pain up to max of 3 total doses.  If no relief after 1 dose, call 911. 25 tablet 3    carvedilol (COREG) 6.25 MG tablet Take 1 tablet by mouth 2 times daily 30 tablet 2    citalopram (CELEXA) 40 MG tablet TAKE 1 TABLET BY MOUTH EVERY DAY 90 tablet 1    tamsulosin (FLOMAX) 0.4 MG capsule Take 1 capsule by mouth daily Take one capsule daily to facilitate passage of ureteral stone 30 capsule 6    lisinopril (PRINIVIL;ZESTRIL) 10 MG tablet TAKE 1 TABLET BY MOUTH ONE TIME A DAY 90 tablet 1    topiramate (TOPAMAX) 50 MG tablet TAKE 1 TABLET BY MOUTH TWO TIMES A  tablet 1    omeprazole (PRILOSEC) 20 MG delayed release capsule TAKE 1 CAPSULE BY MOUTH EVERY DAY 90 capsule 1    vitamin D (ERGOCALCIFEROL) 1.25 MG (43932 UT) CAPS capsule Take 1 capsule by mouth once a week 24 capsule 1    vitamin B-1 (THIAMINE) 100 MG tablet Take 100 mg by mouth daily      traZODone (DESYREL) 50 MG tablet Take 1 tablet by mouth nightly 30 tablet 5    allopurinol (ZYLOPRIM) 300 MG tablet TAKE 1 TABLET BY MOUTH ONE TIME A DAY  90 tablet 3    dicyclomine (BENTYL) 10 MG capsule Take 1 capsule by mouth 4 times daily (Patient taking differently: Take 10 mg by mouth 4 times daily Talking as needed) 120 capsule 3    clopidogrel (PLAVIX) 75 MG tablet Take 1 tablet by mouth daily 90 tablet 3    OnabotulinumtoxinA (BOTOX IJ) Inject as directed Patient is taking for migraines      calcium carbonate (OSCAL) 500 MG TABS tablet Take 500 mg by mouth daily      magnesium 30 MG tablet Take 30 mg by mouth 2 times daily      Galcanezumab-gnlm (EMGALITY) 120 MG/ML SOAJ Inject into the skin      Multiple Vitamins-Minerals (OCUVITE PRESERVISION PO) Take 1 tablet by mouth daily      Acetaminophen (TYLENOL EXTRA STRENGTH PO) Take by mouth Indications: prn      POTASSIUM GLUCONATE PO Take by mouth daily      pregabalin (LYRICA) 50 MG capsule TAKE 1 CAPSULE BY MOUTH THREE TIMES A DAY 90 capsule 2    budesonide (PULMICORT) 0.5 MG/2ML nebulizer suspension USE 2 VIALS AS DIRECTED IN A SINUS RINSE BOTTLE PER DAY (Patient not taking: Reported on 12/6/2021)       No current facility-administered medications for this visit. Allergies   Allergen Reactions    Cupric Oxide     Chrome Alum     Cobalt     Copper-Containing Compounds     Nickel        Health Maintenance   Topic Date Due    Potassium monitoring  03/08/2022    Creatinine monitoring  03/08/2022    Annual Wellness Visit (AWV)  08/04/2022    Lipid screen  09/02/2022    Colon cancer screen colonoscopy  05/28/2029    DTaP/Tdap/Td vaccine (2 - Td or Tdap) 10/01/2030    Flu vaccine  Completed    Shingles Vaccine  Completed    Pneumococcal 65+ years Vaccine  Completed    COVID-19 Vaccine  Completed    AAA screen  Completed    Hepatitis C screen  Completed    Hepatitis A vaccine  Aged Out    Hepatitis B vaccine  Aged Out    Hib vaccine  Aged Out    Meningococcal (ACWY) vaccine  Aged Out       Subjective:      Review of Systems   Constitutional: Negative for fatigue. Respiratory: Negative for apnea, cough, chest tightness and shortness of breath. Cardiovascular: Negative for chest pain, palpitations and leg swelling.    Gastrointestinal: Negative for abdominal pain. Skin: Negative for color change. Neurological: Negative for dizziness, syncope, weakness, light-headedness and headaches. Psychiatric/Behavioral: Negative for sleep disturbance. The patient is not nervous/anxious. Objective:     /78   Pulse 56   Ht 5' 9\" (1.753 m)   Wt 242 lb (109.8 kg)   SpO2 98%   BMI 35.74 kg/m²   Physical Exam  Vitals reviewed. Neck:      Vascular: No carotid bruit. Cardiovascular:      Rate and Rhythm: Normal rate and regular rhythm. Heart sounds: Normal heart sounds. No murmur heard. No friction rub. No gallop. Pulmonary:      Effort: Pulmonary effort is normal.      Breath sounds: Normal breath sounds. Musculoskeletal:      Cervical back: No edema. Neurological:      Mental Status: He is alert and oriented to person, place, and time. Assessment:       Diagnosis Orders   1. Essential hypertension     2. Coronary artery disease involving native coronary artery of native heart without angina pectoris     3. Mixed hyperlipidemia     4. Severe obesity (BMI 35.0-39. 9) with comorbidity ([address]     5. Rib pain on right side  XR RIBS RIGHT INCLUDE CHEST (MIN 3 VIEWS)   6. Irritable bowel syndrome with diarrhea          Plan:    X-ray ordered  Work on exercise and Wloss for CAD  BP well controlled  Updated vaccines. Give [employer]. May take one Immodium daily to help with diarrhea. Return in about 6 months (around 6/6/2022) for recheck--1/2 hour. Orders Placed This Encounter   Procedures    XR RIBS RIGHT INCLUDE CHEST (MIN 3 VIEWS)     Standing Status:   Future     Standing Expiration Date:   12/6/2022     No orders of the defined types were placed in this encounter. Patient given educational materials - see patient instructions. Discussed use, benefit, and side effects of prescribed medications. All patient questions answered. Pt voiced understanding. Reviewed health maintenance.   Instructed to continue current medications, diet and exercise. Patient agreed with treatment plan. Follow up as directed.      Electronically signed by Rhea Jiménez PA-C on 12/6/2021 at 8:41 AM

## 2021-12-17 ENCOUNTER — OFFICE VISIT (OUTPATIENT)
Dept: UROLOGY | Age: 71
End: 2021-12-17
Payer: COMMERCIAL

## 2021-12-17 VITALS
WEIGHT: 242 LBS | BODY MASS INDEX: 35.84 KG/M2 | TEMPERATURE: 95.5 F | DIASTOLIC BLOOD PRESSURE: 80 MMHG | HEART RATE: 63 BPM | SYSTOLIC BLOOD PRESSURE: 143 MMHG | HEIGHT: 69 IN

## 2021-12-17 DIAGNOSIS — R39.15 URINARY URGENCY: Primary | ICD-10-CM

## 2021-12-17 PROCEDURE — 99213 OFFICE O/P EST LOW 20 MIN: CPT | Performed by: UROLOGY

## 2021-12-17 ASSESSMENT — ENCOUNTER SYMPTOMS
DIARRHEA: 0
EYES NEGATIVE: 1
BACK PAIN: 0
RESPIRATORY NEGATIVE: 1
COUGH: 0
GASTROINTESTINAL NEGATIVE: 1
WHEEZING: 0
SHORTNESS OF BREATH: 0
ABDOMINAL PAIN: 0
VOMITING: 0
EYE PAIN: 0
NAUSEA: 0
EYE REDNESS: 0
CONSTIPATION: 0

## 2021-12-17 NOTE — PROGRESS NOTES
[address] [address]  Dept: [address] [facility] Urology Office Note - Established    Patient:  Mario Olsen  YOB: 1950  Date: 12/17/2021    The patient is a 65 y.o. male who presents todayfor evaluation of the following problems:   Chief Complaint   Patient presents with    3 Month Follow-Up     3 month med check       HPI  He is here in follow up for urinary issues. He has a good stream and he empties well. He was started on Flomax. He has issues with frequency. He has issues with urgency at times. He does have some leaking on the way as well. He has some issues with dry mouth already. Summary of old records: N/A    Additional History: N/A    Procedures Today: N/A    Urinalysis today:  No results found for this visit on 12/17/21.   Last several PSA's:  No results found for: PSA  Last total testosterone:  No results found for: TESTOSTERONE    AUA Symptom Score (12/17/2021):                               Last BUN and creatinine:  Lab Results   Component Value Date    BUN 11 03/08/2021     Lab Results   Component Value Date    CREATININE 0.72 03/08/2021       Additional Lab/Culture results: none    Imaging Reviewed during this Office Visit: none  (results were independently reviewed by physician and radiology report verified)    PAST MEDICAL, FAMILY AND SOCIAL HISTORY UPDATE:  Past Medical History:   Diagnosis Date    Anxiety     Duodenitis     Fundic gland polyps of stomach, benign     Hyperlipidemia     Hypertension      Past Surgical History:   Procedure Laterality Date    APPENDECTOMY      BACK SURGERY      CHOLECYSTECTOMY      COLONOSCOPY  3/7/2012    tubular adenoma, hyperplastic polyp    COLONOSCOPY  05/28/2019    JOINT REPLACEMENT      hip    OTHER SURGICAL HISTORY  08/29/2016    Spinal stimulator    SPINAL CORD STIMULATOR SURGERY  08/29/2016    UPPER GASTROINTESTINAL ENDOSCOPY  05/28/2019    UPPER GASTROINTESTINAL ENDOSCOPY  10/15/2020     Family History   Problem Relation Age of Onset    Heart Disease Father     Hypertension Father     Hypertension Brother     Stroke Brother     Tuberculosis Brother      Outpatient Medications Marked as Taking for the 12/17/21 encounter (Office Visit) with Mariano Gutierrez MD   Medication Sig Dispense Refill    ipratropium (ATROVENT) 0.06 % nasal spray INSTILL 2 SPRAYS INTO EACH NOSTRIL UP TO FOUR TIMES A DAY      isosorbide mononitrate (IMDUR) 30 MG extended release tablet TAKE 1 TABLET BY MOUTH EVERY DAY      LORazepam (ATIVAN) 0.5 MG tablet TAKE 1 TABLET BY MOUTH EVERY SIX HOURS AS NEEDED FOR ANXIETY      traZODone (DESYREL) 50 MG tablet TAKE 1 TABLET BY MOUTH AT BEDTIME 90 tablet 1    clopidogrel (PLAVIX) 75 MG tablet TAKE 1 TABLET BY MOUTH EVERY DAY 90 tablet 0    pilocarpine (SALAGEN) 5 MG tablet TAKE 1 TABLET BY MOUTH THREE TIMES A DAY 90 tablet 3    atorvastatin (LIPITOR) 40 MG tablet TAKE 1 TABLET BY MOUTH ONE TIME A DAY 30 tablet 0    nitroGLYCERIN (NITROSTAT) 0.4 MG SL tablet Place 1 tablet under the tongue every 5 minutes as needed for Chest pain up to max of 3 total doses.  If no relief after 1 dose, call 911. 25 tablet 3    carvedilol (COREG) 6.25 MG tablet Take 1 tablet by mouth 2 times daily 30 tablet 2    citalopram (CELEXA) 40 MG tablet TAKE 1 TABLET BY MOUTH EVERY DAY 90 tablet 1    tamsulosin (FLOMAX) 0.4 MG capsule Take 1 capsule by mouth daily Take one capsule daily to facilitate passage of ureteral stone 30 capsule 6    lisinopril (PRINIVIL;ZESTRIL) 10 MG tablet TAKE 1 TABLET BY MOUTH ONE TIME A DAY 90 tablet 1    topiramate (TOPAMAX) 50 MG tablet TAKE 1 TABLET BY MOUTH TWO TIMES A  tablet 1    omeprazole (PRILOSEC) 20 MG delayed release capsule TAKE 1 CAPSULE BY MOUTH EVERY DAY 90 capsule 1    vitamin D (ERGOCALCIFEROL) 1.25 MG (05005 UT) CAPS capsule Take 1 capsule by mouth once a week 24 capsule 1    budesonide (PULMICORT) 0.5 MG/2ML nebulizer suspension USE 2 VIALS AS DIRECTED IN A SINUS RINSE BOTTLE PER DAY      vitamin B-1 (THIAMINE) 100 MG tablet Take 100 mg by mouth daily      allopurinol (ZYLOPRIM) 300 MG tablet TAKE 1 TABLET BY MOUTH ONE TIME A DAY  90 tablet 3    dicyclomine (BENTYL) 10 MG capsule Take 1 capsule by mouth 4 times daily (Patient taking differently: Take 10 mg by mouth 4 times daily Talking as needed) 120 capsule 3    OnabotulinumtoxinA (BOTOX IJ) Inject as directed Patient is taking for migraines      calcium carbonate (OSCAL) 500 MG TABS tablet Take 500 mg by mouth daily      magnesium 30 MG tablet Take 30 mg by mouth 2 times daily      Galcanezumab-gnlm (EMGALITY) 120 MG/ML SOAJ Inject into the skin      Multiple Vitamins-Minerals (OCUVITE PRESERVISION PO) Take 1 tablet by mouth daily      Acetaminophen (TYLENOL EXTRA STRENGTH PO) Take by mouth Indications: prn      POTASSIUM GLUCONATE PO Take by mouth daily         Cupric oxide, Chrome alum, Cobalt, Copper-containing compounds, and Nickel  Social History     Tobacco Use   Smoking Status Former Smoker    Packs/day: 2.00    Years: 35.00    Pack years: 70.00    Types: Cigarettes    Quit date: 2005    Years since quittin.1   Smokeless Tobacco Former User    Types: Chew     (Ifpatient a smoker, smoking cessation counseling offered)    Social History     Substance and Sexual Activity   Alcohol Use Yes    Alcohol/week: 35.0 - 42.0 standard drinks    Types: 35 - 42 Cans of beer per week    Comment: 4-5 beers a day       REVIEW OF SYSTEMS:  Review of Systems    Physical Exam:      Vitals:    21 0844   BP: (!) 143/80   Pulse: 63   Temp: 95.5 °F (35.3 °C)     Body mass index is 35.74 kg/m². Patient is a 65 y.o. male in no acute distress and alert and oriented to person, place and time. Physical Exam  Constitutional: Patient in no acute distress.   Neuro: Alert and oriented to person, place and time.  Psych: Mood normal, affect normal  Lungs: Respiratory effort is normal  Cardiovascular: Warm & Pink  Abdomen: Soft, non-tender, non-distended with no CVA,  No flank tenderness,  Or hepatosplenomegaly   Lymphatics: No palpablelymphadenopathy. Bladder non-tender and not distended. Musculoskeletal: Normal gait and station      Assessment and Plan      1. Urinary urgency           Plan:          He continues with urgency and frequency. Flomax did not help. Will start gemtesa  Samples given. Return in about 4 weeks (around 1/14/2022). Prescriptions Ordered:  No orders of the defined types were placed in this encounter. Orders Placed:  No orders of the defined types were placed in this encounter. Deepak Bryant MD    Agree with the ROS entered by the MA.

## 2021-12-17 NOTE — LETTER
[address] [address]  Dept: [phone]  Dept Fax: [phone]        12/17/21    Patient: María Alexandre  YOB: 1950    Dear Sesar Villar PA-C,    I had the pleasure of seeing one of your patients, Iglesia Lara today in the office today. Below are the relevant portions of my assessment and plan of care. IMPRESSION:  1. Urinary urgency        PLAN:  He continues with urgency and frequency. Flomax did not help. Will start gemtesa  Samples given. Return in about 4 weeks (around 1/14/2022). Prescriptions Ordered:  No orders of the defined types were placed in this encounter. Orders Placed:  No orders of the defined types were placed in this encounter. Thank you for allowing me to participate in the care of this patient. I will keep you updated on this patient's follow up and I look forward to serving you and your patients again in the future.         Anat Funes MD

## 2021-12-17 NOTE — PROGRESS NOTES
Review of Systems   Constitutional: Negative. Negative for appetite change, chills and fatigue. Eyes: Negative. Negative for pain, redness and visual disturbance. Respiratory: Negative. Negative for cough, shortness of breath and wheezing. Cardiovascular: Negative. Negative for chest pain and leg swelling. Gastrointestinal: Negative. Negative for abdominal pain, constipation, diarrhea, nausea and vomiting. Genitourinary: Positive for frequency. Negative for difficulty urinating, dysuria, flank pain, hematuria and urgency. Musculoskeletal: Negative. Negative for back pain, joint swelling and myalgias. Skin: Negative. Negative for rash and wound. Neurological: Negative. Negative for dizziness, weakness and numbness. Hematological: Negative. Does not bruise/bleed easily.

## 2022-01-13 ENCOUNTER — TELEPHONE (OUTPATIENT)
Dept: UROLOGY | Age: 72
End: 2022-01-13

## 2022-01-13 NOTE — TELEPHONE ENCOUNTER
Jeralene Hodgkins (wife) called in and stated \"My  had samples of gemtesa. They are working well for him. Is there any way he can get refills? \"    Patient is scheduled with  1/18/22.  Will discuss then

## 2022-01-18 ENCOUNTER — OFFICE VISIT (OUTPATIENT)
Dept: UROLOGY | Age: 72
End: 2022-01-18
Payer: COMMERCIAL

## 2022-01-18 VITALS
BODY MASS INDEX: 35.84 KG/M2 | SYSTOLIC BLOOD PRESSURE: 130 MMHG | DIASTOLIC BLOOD PRESSURE: 82 MMHG | HEIGHT: 69 IN | WEIGHT: 242 LBS | TEMPERATURE: 96 F

## 2022-01-18 DIAGNOSIS — R39.15 URINARY URGENCY: Primary | ICD-10-CM

## 2022-01-18 PROCEDURE — 99213 OFFICE O/P EST LOW 20 MIN: CPT | Performed by: UROLOGY

## 2022-01-18 RX ORDER — FLUTICASONE PROPIONATE 50 MCG
SPRAY, SUSPENSION (ML) NASAL
COMMUNITY
Start: 2022-01-12 | End: 2022-09-08 | Stop reason: ALTCHOICE

## 2022-01-18 RX ORDER — SOLIFENACIN SUCCINATE 5 MG/1
5 TABLET, FILM COATED ORAL DAILY
Qty: 30 TABLET | Refills: 5 | Status: SHIPPED | OUTPATIENT
Start: 2022-01-18 | End: 2022-09-08 | Stop reason: ALTCHOICE

## 2022-01-18 ASSESSMENT — ENCOUNTER SYMPTOMS
NAUSEA: 0
COUGH: 0
WHEEZING: 0
CONSTIPATION: 0
EYE REDNESS: 0
SHORTNESS OF BREATH: 0
VOMITING: 0
ABDOMINAL PAIN: 1
BACK PAIN: 0
DIARRHEA: 0
EYE PAIN: 0

## 2022-01-18 NOTE — PROGRESS NOTES
[address] [address]  Dept:  [address] [facility] Urology Office Note - Established    Patient:  Jhon Estrella  YOB: 1950  Date: 1/18/2022    The patient is a 66 y.o. male who presents todayfor evaluation of the following problems:   Chief Complaint   Patient presents with    Medication Check     states gemtesa is working, would like refill       HPI  He is here in follow up for OAB. He had been tried on Flomax and did not see any improvement. He was started on gemtesa and has seen a big improvment. He tolerated it well. He does have some issues with dry mouth. Summary of old records: N/A    Additional History: N/A    Procedures Today: N/A    Urinalysis today:  No results found for this visit on 01/18/22. Last several PSA's:  No results found for: PSA  Last total testosterone:  No results found for: TESTOSTERONE    AUA Symptom Score (1/18/2022):                                Last BUN and creatinine:  Lab Results   Component Value Date    BUN 11 03/08/2021     Lab Results   Component Value Date    CREATININE 0.72 03/08/2021       Additional Lab/Culture results: none    Imaging Reviewed during this Office Visit: none    (results were independently reviewed by physician and radiology report verified)    PAST MEDICAL, FAMILY AND SOCIAL HISTORY UPDATE:  Past Medical History:   Diagnosis Date    Anxiety     Duodenitis     Fundic gland polyps of stomach, benign     Hyperlipidemia     Hypertension      Past Surgical History:   Procedure Laterality Date    APPENDECTOMY      BACK SURGERY      CHOLECYSTECTOMY      COLONOSCOPY  3/7/2012    tubular adenoma, hyperplastic polyp    COLONOSCOPY  05/28/2019    JOINT REPLACEMENT      hip    OTHER SURGICAL HISTORY  08/29/2016    Spinal stimulator    SPINAL CORD STIMULATOR SURGERY  08/29/2016    UPPER GASTROINTESTINAL ENDOSCOPY  05/28/2019    UPPER GASTROINTESTINAL ENDOSCOPY  10/15/2020     Family History   Problem Relation Age of Onset    Heart Disease Father     Hypertension Father     Hypertension Brother     Stroke Brother     Tuberculosis Brother      Outpatient Medications Marked as Taking for the 1/18/22 encounter (Office Visit) with Miladis Lancaster MD   Medication Sig Dispense Refill    fluticasone (FLONASE) 50 MCG/ACT nasal spray INHALE 2 SPRAYS IN EACH NOSTRIL ONE TIME A DAY      solifenacin (VESICARE) 5 MG tablet Take 1 tablet by mouth daily 30 tablet 5    ipratropium (ATROVENT) 0.06 % nasal spray INSTILL 2 SPRAYS INTO EACH NOSTRIL UP TO FOUR TIMES A DAY      isosorbide mononitrate (IMDUR) 30 MG extended release tablet TAKE 1 TABLET BY MOUTH EVERY DAY      LORazepam (ATIVAN) 0.5 MG tablet TAKE 1 TABLET BY MOUTH EVERY SIX HOURS AS NEEDED FOR ANXIETY      traZODone (DESYREL) 50 MG tablet TAKE 1 TABLET BY MOUTH AT BEDTIME 90 tablet 1    clopidogrel (PLAVIX) 75 MG tablet TAKE 1 TABLET BY MOUTH EVERY DAY 90 tablet 0    pilocarpine (SALAGEN) 5 MG tablet TAKE 1 TABLET BY MOUTH THREE TIMES A DAY 90 tablet 3    atorvastatin (LIPITOR) 40 MG tablet TAKE 1 TABLET BY MOUTH ONE TIME A DAY 30 tablet 0    nitroGLYCERIN (NITROSTAT) 0.4 MG SL tablet Place 1 tablet under the tongue every 5 minutes as needed for Chest pain up to max of 3 total doses.  If no relief after 1 dose, call 911. 25 tablet 3    carvedilol (COREG) 6.25 MG tablet Take 1 tablet by mouth 2 times daily 30 tablet 2    citalopram (CELEXA) 40 MG tablet TAKE 1 TABLET BY MOUTH EVERY DAY 90 tablet 1    tamsulosin (FLOMAX) 0.4 MG capsule Take 1 capsule by mouth daily Take one capsule daily to facilitate passage of ureteral stone 30 capsule 6    lisinopril (PRINIVIL;ZESTRIL) 10 MG tablet TAKE 1 TABLET BY MOUTH ONE TIME A DAY 90 tablet 1    topiramate (TOPAMAX) 50 MG tablet TAKE 1 TABLET BY MOUTH TWO TIMES A  tablet 1    omeprazole (PRILOSEC) 20 MG delayed release capsule TAKE 1 CAPSULE BY MOUTH EVERY DAY 90 capsule 1    vitamin D (ERGOCALCIFEROL) 1.25 MG (37416 UT) CAPS capsule Take 1 capsule by mouth once a week 24 capsule 1    budesonide (PULMICORT) 0.5 MG/2ML nebulizer suspension USE 2 VIALS AS DIRECTED IN A SINUS RINSE BOTTLE PER DAY      vitamin B-1 (THIAMINE) 100 MG tablet Take 100 mg by mouth daily      allopurinol (ZYLOPRIM) 300 MG tablet TAKE 1 TABLET BY MOUTH ONE TIME A DAY  90 tablet 3    dicyclomine (BENTYL) 10 MG capsule Take 1 capsule by mouth 4 times daily (Patient taking differently: Take 10 mg by mouth 4 times daily Talking as needed) 120 capsule 3    OnabotulinumtoxinA (BOTOX IJ) Inject as directed Patient is taking for migraines      calcium carbonate (OSCAL) 500 MG TABS tablet Take 500 mg by mouth daily      magnesium 30 MG tablet Take 30 mg by mouth 2 times daily      Galcanezumab-gnlm (EMGALITY) 120 MG/ML SOAJ Inject into the skin      Multiple Vitamins-Minerals (OCUVITE PRESERVISION PO) Take 1 tablet by mouth daily      Acetaminophen (TYLENOL EXTRA STRENGTH PO) Take by mouth Indications: prn      POTASSIUM GLUCONATE PO Take by mouth daily         Cupric oxide, Chrome alum, Cobalt, Copper-containing compounds, and Nickel  Social History     Tobacco Use   Smoking Status Former Smoker    Packs/day: 2.00    Years: 35.00    Pack years: 70.00    Types: Cigarettes    Quit date: 2005    Years since quittin.1   Smokeless Tobacco Former User    Types: Chew     (Ifpatient a smoker, smoking cessation counseling offered)    Social History     Substance and Sexual Activity   Alcohol Use Yes    Alcohol/week: 35.0 - 42.0 standard drinks    Types: 35 - 42 Cans of beer per week    Comment: 4-5 beers a day       REVIEW OF SYSTEMS:  Review of Systems    Physical Exam:      Vitals:    22 1143   BP: 130/82   Temp: 96 °F (35.6 °C)     Body mass index is 35.74 kg/m².   Patient is a 66 y.o. male in no acute distress and alert and oriented to person, place and time. Physical Exam  Constitutional: Patient in no acute distress. Neuro: Alert and oriented to person, place and time. Psych: Mood normal, affect normal  Lungs: Respiratory effort is normal  Cardiovascular: Warm & Pink  Abdomen: Soft, non-tender, non-distended with no CVA,  No flank tenderness,  Or hepatosplenomegaly   Lymphatics: No palpablelymphadenopathy. Bladder non-tender and not distended. Musculoskeletal: Normal gait and station      Assessment and Plan      1. Urinary urgency           Plan:          He did well with gemtesa. Voiding issues much improved. Will start vesicare. Return in about 3 months (around 4/18/2022). Prescriptions Ordered:  Orders Placed This Encounter   Medications    solifenacin (VESICARE) 5 MG tablet     Sig: Take 1 tablet by mouth daily     Dispense:  30 tablet     Refill:  5     Orders Placed:  No orders of the defined types were placed in this encounter. Luis Martinez MD    Agree with the ROS entered by the MA.

## 2022-01-18 NOTE — PROGRESS NOTES
Review of Systems   Constitutional: Negative for chills, fatigue and fever. Eyes: Negative for pain, redness and visual disturbance. Respiratory: Negative for cough, shortness of breath and wheezing. Cardiovascular: Negative for chest pain and leg swelling. Gastrointestinal: Positive for abdominal pain. Negative for constipation, diarrhea, nausea and vomiting. Genitourinary: Positive for urgency. Negative for difficulty urinating, dysuria, flank pain, frequency, hematuria, scrotal swelling and testicular pain. Musculoskeletal: Negative for back pain, joint swelling and myalgias. Skin: Negative for rash and wound. Neurological: Negative for dizziness, tremors and numbness. Hematological: Bruises/bleeds easily.

## 2022-01-25 DIAGNOSIS — R10.30 LOWER ABDOMINAL PAIN: ICD-10-CM

## 2022-01-26 DIAGNOSIS — R07.81 RIB PAIN ON RIGHT SIDE: ICD-10-CM

## 2022-01-27 RX ORDER — DICYCLOMINE HYDROCHLORIDE 10 MG/1
10 CAPSULE ORAL 4 TIMES DAILY PRN
Qty: 120 CAPSULE | Refills: 0 | Status: SHIPPED | OUTPATIENT
Start: 2022-01-27

## 2022-02-28 ENCOUNTER — TELEPHONE (OUTPATIENT)
Dept: PRIMARY CARE CLINIC | Age: 72
End: 2022-02-28

## 2022-03-03 LAB
ANION GAP SERPL CALCULATED.3IONS-SCNC: 8 MMOL/L (ref 5–15)
BUN BLDV-MCNC: 11 MG/DL (ref 5–27)
CALCIUM SERPL-MCNC: 8.9 MG/DL (ref 8.5–10.5)
CHLORIDE BLD-SCNC: 105 MMOL/L (ref 98–109)
CO2: 30 MMOL/L (ref 22–32)
CREAT SERPL-MCNC: 0.77 MG/DL (ref 0.6–1.3)
EGFR AFRICAN AMERICAN: >60 ML/MIN/1.73SQ.M
EGFR IF NONAFRICAN AMERICAN: >60 ML/MIN/1.73SQ.M
GLUCOSE: 99 MG/DL (ref 65–99)
POTASSIUM SERPL-SCNC: 4.2 MMOL/L (ref 3.5–5)
SODIUM BLD-SCNC: 143 MMOL/L (ref 134–146)

## 2022-03-08 ENCOUNTER — OFFICE VISIT (OUTPATIENT)
Dept: PRIMARY CARE CLINIC | Age: 72
End: 2022-03-08
Payer: COMMERCIAL

## 2022-03-08 VITALS
WEIGHT: 242.4 LBS | OXYGEN SATURATION: 98 % | HEART RATE: 78 BPM | DIASTOLIC BLOOD PRESSURE: 70 MMHG | BODY MASS INDEX: 35.8 KG/M2 | SYSTOLIC BLOOD PRESSURE: 136 MMHG

## 2022-03-08 DIAGNOSIS — R10.11 RUQ PAIN: ICD-10-CM

## 2022-03-08 DIAGNOSIS — K14.8 LESION OF TONGUE: ICD-10-CM

## 2022-03-08 DIAGNOSIS — I10 ESSENTIAL HYPERTENSION: Primary | ICD-10-CM

## 2022-03-08 DIAGNOSIS — F41.9 ANXIETY: ICD-10-CM

## 2022-03-08 DIAGNOSIS — R68.2 DRY MOUTH: ICD-10-CM

## 2022-03-08 DIAGNOSIS — H61.23 BILATERAL IMPACTED CERUMEN: ICD-10-CM

## 2022-03-08 LAB
ALBUMIN SERPL-MCNC: 3.9 G/DL (ref 3.2–5.3)
ALK PHOSPHATASE: 67 U/L (ref 39–130)
ALT SERPL-CCNC: 22 U/L (ref 0–40)
AST SERPL-CCNC: 20 U/L (ref 0–41)
BILIRUB SERPL-MCNC: 0.4 MG/DL (ref 0.3–1.2)
BILIRUBIN DIRECT: 0.1 MG/DL (ref 0–0.4)
TOTAL PROTEIN: 6.6 G/DL (ref 6–8)

## 2022-03-08 PROCEDURE — 99214 OFFICE O/P EST MOD 30 MIN: CPT | Performed by: PHYSICIAN ASSISTANT

## 2022-03-08 RX ORDER — CLOPIDOGREL BISULFATE 75 MG/1
TABLET ORAL
Qty: 90 TABLET | Refills: 1 | Status: SHIPPED | OUTPATIENT
Start: 2022-03-08 | End: 2022-09-06

## 2022-03-08 RX ORDER — ATORVASTATIN CALCIUM 40 MG/1
TABLET, FILM COATED ORAL
Qty: 90 TABLET | Refills: 3 | Status: SHIPPED
Start: 2022-03-08 | End: 2022-04-12 | Stop reason: DRUGHIGH

## 2022-03-08 RX ORDER — PILOCARPINE HYDROCHLORIDE 5 MG/1
TABLET, FILM COATED ORAL
Qty: 90 TABLET | Refills: 1 | OUTPATIENT
Start: 2022-03-08

## 2022-03-08 RX ORDER — TOPIRAMATE 50 MG/1
TABLET, FILM COATED ORAL
Qty: 180 TABLET | Refills: 1 | Status: SHIPPED | OUTPATIENT
Start: 2022-03-08 | End: 2022-09-06

## 2022-03-08 RX ORDER — PILOCARPINE HYDROCHLORIDE 7.5 MG/1
TABLET, FILM COATED ORAL
Qty: 90 TABLET | Refills: 1 | Status: SHIPPED | OUTPATIENT
Start: 2022-03-08 | End: 2022-04-12 | Stop reason: DRUGHIGH

## 2022-03-08 RX ORDER — CITALOPRAM 40 MG/1
TABLET ORAL
Qty: 90 TABLET | Refills: 3 | Status: SHIPPED | OUTPATIENT
Start: 2022-03-08

## 2022-03-08 ASSESSMENT — ENCOUNTER SYMPTOMS
RHINORRHEA: 1
SHORTNESS OF BREATH: 1

## 2022-03-08 NOTE — PROGRESS NOTES
[address] [facility] PRIMARY CARE  [address] [facility] [address]  [address]pt: [address] is a 66 y.o. male who presents today for his medical conditions/complaints as noted below. Chief Complaint   Patient presents with    Pharyngitis     Sores on tongue started 2 weeks ago       HPI:     HPI  Sore on bottom of tongue off and on for one year and ST for 2  weeks now. No worse reflux. Has PND. Dry mouth is still bad  Vesicare is helping a lot  Still having RUQ pain---x-ray of ribs was normal.    LDL Calculated (mg/dL)   Date Value   2021 75   10/23/2019 83   2017 84       (goal LDL is <100)   BUN (mg/dL)   Date Value   2022 11     BP Readings from Last 3 Encounters:   22 136/70   22 130/82   21 (!) 143/80          (goal 120/80)    Past Medical History:   Diagnosis Date    Anxiety     Duodenitis     Fundic gland polyps of stomach, benign     Hyperlipidemia     Hypertension       Past Surgical History:   Procedure Laterality Date    APPENDECTOMY      BACK SURGERY      CHOLECYSTECTOMY      COLONOSCOPY  3/7/2012    tubular adenoma, hyperplastic polyp    COLONOSCOPY  2019    JOINT REPLACEMENT      hip    OTHER SURGICAL HISTORY  2016    Spinal stimulator    SPINAL CORD STIMULATOR SURGERY  2016    UPPER GASTROINTESTINAL ENDOSCOPY  2019    UPPER GASTROINTESTINAL ENDOSCOPY  10/15/2020       Family History   Problem Relation Age of Onset    Heart Disease Father     Hypertension Father     Hypertension Brother     Stroke Brother     Tuberculosis Brother        Social History     Tobacco Use    Smoking status: Former Smoker     Packs/day: 2.00     Years: 35.00     Pack years: 70.00     Types: Cigarettes     Quit date: 2005     Years since quittin.3    Smokeless tobacco: Former User     Types: Chew   Substance Use Topics    Alcohol use:  Yes     Alcohol/week: 35.0 - 42.0 standard drinks     Types: 35 - 42 Cans of beer per week     Comment: 4-5 beers a day      Current Outpatient Medications   Medication Sig Dispense Refill    atorvastatin (LIPITOR) 40 MG tablet TAKE 1 TABLET BY MOUTH ONE TIME A DAY 90 tablet 3    citalopram (CELEXA) 40 MG tablet TAKE 1 TABLET BY MOUTH EVERY DAY 90 tablet 3    pilocarpine (SALAGEN) 7.5 MG tablet TAKE 1 TABLET BY MOUTH THREE TIMES A DAY 90 tablet 1    pregabalin (LYRICA) 50 MG capsule TAKE 1 CAPSULE BY MOUTH THREE TIMES A DAY 90 capsule 2    dicyclomine (BENTYL) 10 MG capsule Take 1 capsule by mouth 4 times daily as needed (cramping) 120 capsule 0    fluticasone (FLONASE) 50 MCG/ACT nasal spray INHALE 2 SPRAYS IN EACH NOSTRIL ONE TIME A DAY      solifenacin (VESICARE) 5 MG tablet Take 1 tablet by mouth daily 30 tablet 5    ipratropium (ATROVENT) 0.06 % nasal spray INSTILL 2 SPRAYS INTO EACH NOSTRIL UP TO FOUR TIMES A DAY      isosorbide mononitrate (IMDUR) 30 MG extended release tablet TAKE 1 TABLET BY MOUTH EVERY DAY      LORazepam (ATIVAN) 0.5 MG tablet TAKE 1 TABLET BY MOUTH EVERY SIX HOURS AS NEEDED FOR ANXIETY      traZODone (DESYREL) 50 MG tablet TAKE 1 TABLET BY MOUTH AT BEDTIME 90 tablet 1    carvedilol (COREG) 6.25 MG tablet Take 1 tablet by mouth 2 times daily 30 tablet 2    lisinopril (PRINIVIL;ZESTRIL) 10 MG tablet TAKE 1 TABLET BY MOUTH ONE TIME A DAY 90 tablet 1    omeprazole (PRILOSEC) 20 MG delayed release capsule TAKE 1 CAPSULE BY MOUTH EVERY DAY 90 capsule 1    vitamin D (ERGOCALCIFEROL) 1.25 MG (22916 UT) CAPS capsule Take 1 capsule by mouth once a week 24 capsule 1    budesonide (PULMICORT) 0.5 MG/2ML nebulizer suspension USE 2 VIALS AS DIRECTED IN A SINUS RINSE BOTTLE PER DAY      vitamin B-1 (THIAMINE) 100 MG tablet Take 100 mg by mouth daily      allopurinol (ZYLOPRIM) 300 MG tablet TAKE 1 TABLET BY MOUTH ONE TIME A DAY  90 tablet 3    OnabotulinumtoxinA (BOTOX IJ) Inject as directed Patient is taking for migraines      calcium carbonate (OSCAL) 500 MG TABS tablet Take 500 mg by mouth daily      magnesium 30 MG tablet Take 30 mg by mouth 2 times daily      Galcanezumab-gnlm (EMGALITY) 120 MG/ML SOAJ Inject into the skin      Acetaminophen (TYLENOL EXTRA STRENGTH PO) Take by mouth Indications: prn      POTASSIUM GLUCONATE PO Take by mouth daily      clopidogrel (PLAVIX) 75 MG tablet TAKE 1 TABLET BY MOUTH EVERY DAY 90 tablet 1    topiramate (TOPAMAX) 50 MG tablet TAKE 1 TABLET BY MOUTH TWO TIMES A  tablet 1    nitroGLYCERIN (NITROSTAT) 0.4 MG SL tablet Place 1 tablet under the tongue every 5 minutes as needed for Chest pain up to max of 3 total doses. If no relief after 1 dose, call 911. (Patient not taking: Reported on 3/8/2022) 25 tablet 3    tamsulosin (FLOMAX) 0.4 MG capsule Take 1 capsule by mouth daily Take one capsule daily to facilitate passage of ureteral stone (Patient not taking: Reported on 3/8/2022) 30 capsule 6     No current facility-administered medications for this visit. Allergies   Allergen Reactions    Cupric Oxide     Chrome Alum     Cobalt     Copper-Containing Compounds     Nickel        Health Maintenance   Topic Date Due    Depression Screen  08/03/2022    Annual Wellness Visit (AWV)  08/04/2022    Lipid screen  09/02/2022    Potassium monitoring  03/03/2023    Creatinine monitoring  03/03/2023    Colorectal Cancer Screen  05/28/2029    DTaP/Tdap/Td vaccine (2 - Td or Tdap) 10/01/2030    Flu vaccine  Completed    Shingles Vaccine  Completed    Pneumococcal 65+ years Vaccine  Completed    COVID-19 Vaccine  Completed    AAA screen  Completed    Hepatitis C screen  Completed    Hepatitis A vaccine  Aged Out    Hepatitis B vaccine  Aged Out    Hib vaccine  Aged Out    Meningococcal (ACWY) vaccine  Aged Out       Subjective:      Review of Systems   Constitutional: Negative for chills, diaphoresis, fatigue and fever.    HENT: Positive for congestion, mouth sores, postnasal drip (couging it up in am and it is yellow) and rhinorrhea. Respiratory: Positive for shortness of breath (his usual ). Cardiovascular: Negative for chest pain. Neurological: Positive for headaches (more lately). Negative for dizziness and light-headedness. Hematological: Negative for adenopathy. Objective:     /70   Pulse 78   Wt 242 lb 6.4 oz (110 kg)   SpO2 98%   BMI 35.80 kg/m²   Physical Exam  Vitals and nursing note reviewed. Constitutional:       Appearance: Normal appearance. He is obese. He is not ill-appearing. HENT:      Mouth/Throat:      Comments: Light white lesion under left side of tongue---\"feels like a cut\" to him  Cardiovascular:      Rate and Rhythm: Normal rate and regular rhythm. Heart sounds: Normal heart sounds. Pulmonary:      Effort: Pulmonary effort is normal.      Breath sounds: Normal breath sounds. Abdominal:      General: Abdomen is flat. Bowel sounds are normal. There is no distension. Palpations: There is no mass. Tenderness: There is abdominal tenderness. There is no guarding or rebound. Hernia: No hernia is present. Neurological:      Mental Status: He is alert and oriented to person, place, and time. Psychiatric:         Mood and Affect: Mood normal.         Assessment:       Diagnosis Orders   1. Essential hypertension     2. Dry mouth     3. Anxiety  citalopram (CELEXA) 40 MG tablet   4. Lesion of tongue  External Referral To Oral Maxillofacial Surgery   5. RUQ pain  Hepatic Function Panel    CT ABDOMEN PELVIS W IV CONTRAST   6. Bilateral impacted cerumen          Plan:    Refills sent  Referral to oral surgery   Ear wash stopped due to pain in right ear. Left clear of wax. Right use drops and will try again at 1 month recheck   CT scan ordered for RUQ pain  Increased Salagen to 7.5mg tid    Return in about 6 months (around 9/8/2022) for recheck--1/2 hour.     Orders Placed This Encounter   Procedures    CT ABDOMEN PELVIS W IV CONTRAST     Standing Status:   Future     Standing Expiration Date:   3/8/2023     Order Specific Question:   STAT Creatinine as needed:     Answer: Yes    Hepatic Function Panel     Standing Status:   Future     Standing Expiration Date:   3/8/2023    External Referral To Oral Maxillofacial Surgery     Referral Priority:   Routine     Referral Type:   Eval and Treat     Referral Reason:   Specialty Services Required     Referred to Provider:   Simon Cooley DDS     Requested Specialty:   Oral And Maxillofacial Surgery     Number of Visits Requested:   1     Orders Placed This Encounter   Medications    atorvastatin (LIPITOR) 40 MG tablet     Sig: TAKE 1 TABLET BY MOUTH ONE TIME A DAY     Dispense:  90 tablet     Refill:  3    citalopram (CELEXA) 40 MG tablet     Sig: TAKE 1 TABLET BY MOUTH EVERY DAY     Dispense:  90 tablet     Refill:  3    pilocarpine (SALAGEN) 7.5 MG tablet     Sig: TAKE 1 TABLET BY MOUTH THREE TIMES A DAY     Dispense:  90 tablet     Refill:  1       Patient given educational materials - see patient instructions. Discussed use, benefit, and side effects of prescribed medications. All patient questions answered. Pt voiced understanding. Reviewed health maintenance. Instructed to continue current medications, diet and exercise. Patient agreed with treatment plan. Follow up as directed.      Electronically signed by Hortensia Kaur PA-C on 3/8/2022 at 11:45 AM

## 2022-03-10 ENCOUNTER — TELEPHONE (OUTPATIENT)
Dept: PRIMARY CARE CLINIC | Age: 72
End: 2022-03-10

## 2022-03-10 NOTE — TELEPHONE ENCOUNTER
I called pt to give BW results, pt's wife Mariya Murguia answered phone, per hipaa ok for results but she had a question to ask, she states when pt was in office she believes they forgot to discuss his carotid test, she stated results showed carotid artery is blocked but never addressed it. She stated you were able to see the result in chart.      Can you please review and advise

## 2022-03-10 NOTE — TELEPHONE ENCOUNTER
I can not see the carotid doppler.   Please get the result or he will need to contact the provider that ordered the test.

## 2022-03-11 RX ORDER — TAMSULOSIN HYDROCHLORIDE 0.4 MG/1
CAPSULE ORAL
Qty: 30 CAPSULE | Refills: 0 | OUTPATIENT
Start: 2022-03-11

## 2022-03-14 RX ORDER — TAMSULOSIN HYDROCHLORIDE 0.4 MG/1
CAPSULE ORAL
Qty: 30 CAPSULE | Refills: 0 | OUTPATIENT
Start: 2022-03-14

## 2022-03-21 DIAGNOSIS — I10 ESSENTIAL HYPERTENSION: ICD-10-CM

## 2022-03-21 RX ORDER — CARVEDILOL 6.25 MG/1
6.25 TABLET ORAL 2 TIMES DAILY
Qty: 90 TABLET | Refills: 1 | Status: SHIPPED | OUTPATIENT
Start: 2022-03-21 | End: 2022-06-06

## 2022-04-04 DIAGNOSIS — R10.11 RUQ PAIN: ICD-10-CM

## 2022-04-04 NOTE — RESULT ENCOUNTER NOTE
Call and advise patient that the results showed degenerative changes and coronary artery calcifications which patient should follow up with Barrett Mckeon to discuss.

## 2022-04-08 ENCOUNTER — NURSE ONLY (OUTPATIENT)
Dept: PRIMARY CARE CLINIC | Age: 72
End: 2022-04-08

## 2022-04-08 VITALS
DIASTOLIC BLOOD PRESSURE: 62 MMHG | WEIGHT: 236.5 LBS | HEART RATE: 64 BPM | SYSTOLIC BLOOD PRESSURE: 116 MMHG | OXYGEN SATURATION: 98 % | HEIGHT: 69 IN | BODY MASS INDEX: 35.03 KG/M2

## 2022-04-08 NOTE — PROGRESS NOTES
Patient is here for a bilateral ear wash, Both ear are clear and patient tolerated the ear wash well .

## 2022-04-12 ENCOUNTER — OFFICE VISIT (OUTPATIENT)
Dept: PRIMARY CARE CLINIC | Age: 72
End: 2022-04-12
Payer: COMMERCIAL

## 2022-04-12 VITALS
BODY MASS INDEX: 35.1 KG/M2 | SYSTOLIC BLOOD PRESSURE: 118 MMHG | HEART RATE: 67 BPM | DIASTOLIC BLOOD PRESSURE: 74 MMHG | HEIGHT: 69 IN | OXYGEN SATURATION: 96 % | WEIGHT: 237 LBS

## 2022-04-12 DIAGNOSIS — R10.11 RIGHT UPPER QUADRANT ABDOMINAL PAIN: ICD-10-CM

## 2022-04-12 DIAGNOSIS — R68.2 DRY MOUTH: ICD-10-CM

## 2022-04-12 DIAGNOSIS — I65.23 CALCIFICATION OF BOTH CAROTID ARTERIES: Primary | ICD-10-CM

## 2022-04-12 DIAGNOSIS — F41.9 ANXIETY: ICD-10-CM

## 2022-04-12 DIAGNOSIS — E78.2 MIXED HYPERLIPIDEMIA: ICD-10-CM

## 2022-04-12 PROCEDURE — 99214 OFFICE O/P EST MOD 30 MIN: CPT | Performed by: PHYSICIAN ASSISTANT

## 2022-04-12 RX ORDER — CLOTRIMAZOLE 10 MG/1
LOZENGE ORAL; TOPICAL
COMMUNITY
Start: 2022-04-04 | End: 2022-09-08 | Stop reason: ALTCHOICE

## 2022-04-12 RX ORDER — ATORVASTATIN CALCIUM 80 MG/1
TABLET, FILM COATED ORAL
Qty: 90 TABLET | Refills: 3 | Status: SHIPPED
Start: 2022-04-12 | End: 2022-05-20 | Stop reason: SDUPTHER

## 2022-04-12 RX ORDER — PILOCARPINE HYDROCHLORIDE 7.5 MG/1
7.5 TABLET, FILM COATED ORAL 3 TIMES DAILY
Qty: 90 TABLET | Refills: 2 | Status: SHIPPED | OUTPATIENT
Start: 2022-04-12 | End: 2022-06-15 | Stop reason: DRUGHIGH

## 2022-04-12 RX ORDER — LORAZEPAM 0.5 MG/1
TABLET ORAL
Qty: 30 TABLET | Refills: 2 | Status: SHIPPED | OUTPATIENT
Start: 2022-04-12 | End: 2022-05-12

## 2022-04-12 ASSESSMENT — ENCOUNTER SYMPTOMS
COUGH: 0
EYE DISCHARGE: 0
EYE REDNESS: 0
WHEEZING: 0
DIARRHEA: 0
RHINORRHEA: 0
ABDOMINAL PAIN: 1
NAUSEA: 0
SORE THROAT: 0
SHORTNESS OF BREATH: 0
VOMITING: 0

## 2022-04-12 NOTE — PROGRESS NOTES
[facility] PRIMARY CARE  [address] [facility] [address]  [address]: [address] is a 66 y.o. male who presents today for his medical conditions/complaints as noted below. Chief Complaint   Patient presents with    Results     discuss lab work and XR cervical spine.  XR results scanned in chart        HPI:     HPI  BW is normal   Cervical spine results show moderate carotid calcification  On Lipitor 40mg---willing to increase  Ct of abdomen does not find cause for pain in right upper quadrant---does see atherosclerosis in aorta and CAD  Had cardiac cath last fall    Dry mouth--never received increased Salagen  Saw oral surgery on tongue lesion--not cancerous       LDL Calculated (mg/dL)   Date Value   09/02/2021 75   10/23/2019 83   09/19/2017 84       (goal LDL is <100)   AST (U/L)   Date Value   03/08/2022 20     ALT (U/L)   Date Value   03/08/2022 22     BUN (mg/dL)   Date Value   03/03/2022 11     BP Readings from Last 3 Encounters:   04/12/22 118/74   04/08/22 116/62   03/08/22 136/70          (goal 120/80)    Past Medical History:   Diagnosis Date    Anxiety     Duodenitis     Fundic gland polyps of stomach, benign     Hyperlipidemia     Hypertension       Past Surgical History:   Procedure Laterality Date    APPENDECTOMY      BACK SURGERY      CHOLECYSTECTOMY      COLONOSCOPY  3/7/2012    tubular adenoma, hyperplastic polyp    COLONOSCOPY  05/28/2019    JOINT REPLACEMENT      hip    OTHER SURGICAL HISTORY  08/29/2016    Spinal stimulator    SPINAL CORD STIMULATOR SURGERY  08/29/2016    UPPER GASTROINTESTINAL ENDOSCOPY  05/28/2019    UPPER GASTROINTESTINAL ENDOSCOPY  10/15/2020       Family History   Problem Relation Age of Onset    Heart Disease Father     Hypertension Father     Cancer Sister         lung cancer    Hypertension Brother     Stroke Brother     Tuberculosis Brother        Social History     Tobacco Use    Smoking status: Former Smoker     Packs/day: 2.00     Years: 35.00     Pack years: 70.00     Types: Cigarettes     Quit date: 2005     Years since quittin.4    Smokeless tobacco: Former User     Types: Chew   Substance Use Topics    Alcohol use: Yes     Alcohol/week: 35.0 - 42.0 standard drinks     Types: 35 - 42 Cans of beer per week     Comment: 4-5 beers a day      Current Outpatient Medications   Medication Sig Dispense Refill    clotrimazole (MYCELEX) 10 MG sujey Dissolve 1 sujey slowly in mouth five times a day      LORazepam (ATIVAN) 0.5 MG tablet TAKE 1 TABLET BY MOUTH EVERY SIX HOURS AS NEEDED FOR ANXIETY 30 tablet 2    atorvastatin (LIPITOR) 80 MG tablet TAKE 1 TABLET BY MOUTH ONE TIME A DAY 90 tablet 3    pilocarpine (SALAGEN) 7.5 MG tablet Take 1 tablet by mouth 3 times daily 90 tablet 2    carvedilol (COREG) 6.25 MG tablet Take 1 tablet by mouth 2 times daily 90 tablet 1    clopidogrel (PLAVIX) 75 MG tablet TAKE 1 TABLET BY MOUTH EVERY DAY 90 tablet 1    topiramate (TOPAMAX) 50 MG tablet TAKE 1 TABLET BY MOUTH TWO TIMES A  tablet 1    citalopram (CELEXA) 40 MG tablet TAKE 1 TABLET BY MOUTH EVERY DAY 90 tablet 3    dicyclomine (BENTYL) 10 MG capsule Take 1 capsule by mouth 4 times daily as needed (cramping) 120 capsule 0    fluticasone (FLONASE) 50 MCG/ACT nasal spray INHALE 2 SPRAYS IN EACH NOSTRIL ONE TIME A DAY      solifenacin (VESICARE) 5 MG tablet Take 1 tablet by mouth daily 30 tablet 5    ipratropium (ATROVENT) 0.06 % nasal spray INSTILL 2 SPRAYS INTO EACH NOSTRIL UP TO FOUR TIMES A DAY      isosorbide mononitrate (IMDUR) 30 MG extended release tablet TAKE 1 TABLET BY MOUTH EVERY DAY      traZODone (DESYREL) 50 MG tablet TAKE 1 TABLET BY MOUTH AT BEDTIME 90 tablet 1    nitroGLYCERIN (NITROSTAT) 0.4 MG SL tablet Place 1 tablet under the tongue every 5 minutes as needed for Chest pain up to max of 3 total doses.  If no relief after 1 dose, call 911. 25 tablet 3  lisinopril (PRINIVIL;ZESTRIL) 10 MG tablet TAKE 1 TABLET BY MOUTH ONE TIME A DAY 90 tablet 1    omeprazole (PRILOSEC) 20 MG delayed release capsule TAKE 1 CAPSULE BY MOUTH EVERY DAY 90 capsule 1    vitamin D (ERGOCALCIFEROL) 1.25 MG (13917 UT) CAPS capsule Take 1 capsule by mouth once a week 24 capsule 1    budesonide (PULMICORT) 0.5 MG/2ML nebulizer suspension USE 2 VIALS AS DIRECTED IN A SINUS RINSE BOTTLE PER DAY      vitamin B-1 (THIAMINE) 100 MG tablet Take 100 mg by mouth daily      allopurinol (ZYLOPRIM) 300 MG tablet TAKE 1 TABLET BY MOUTH ONE TIME A DAY  90 tablet 3    OnabotulinumtoxinA (BOTOX IJ) Inject as directed Patient is taking for migraines      calcium carbonate (OSCAL) 500 MG TABS tablet Take 500 mg by mouth daily      magnesium 30 MG tablet Take 30 mg by mouth 2 times daily      Galcanezumab-gnlm (EMGALITY) 120 MG/ML SOAJ Inject into the skin      POTASSIUM GLUCONATE PO Take by mouth daily      pregabalin (LYRICA) 50 MG capsule TAKE 1 CAPSULE BY MOUTH THREE TIMES A DAY 90 capsule 2    tamsulosin (FLOMAX) 0.4 MG capsule Take 1 capsule by mouth daily Take one capsule daily to facilitate passage of ureteral stone (Patient not taking: Reported on 4/12/2022) 30 capsule 6    Acetaminophen (TYLENOL EXTRA STRENGTH PO) Take by mouth Indications: prn       No current facility-administered medications for this visit.      Allergies   Allergen Reactions    Cupric Oxide     Chrome Alum     Cobalt     Copper-Containing Compounds     Nickel        Health Maintenance   Topic Date Due    Depression Screen  08/03/2022    Annual Wellness Visit (AWV)  08/04/2022    Lipid screen  09/02/2022    Potassium monitoring  03/03/2023    Creatinine monitoring  03/03/2023    Colorectal Cancer Screen  05/28/2029    DTaP/Tdap/Td vaccine (2 - Td or Tdap) 10/01/2030    Flu vaccine  Completed    Shingles Vaccine  Completed    Pneumococcal 65+ years Vaccine  Completed    COVID-19 Vaccine  Completed  AAA screen  Completed    Hepatitis C screen  Completed    Hepatitis A vaccine  Aged Out    Hepatitis B vaccine  Aged Out    Hib vaccine  Aged Out    Meningococcal (ACWY) vaccine  Aged Out       Subjective:      Review of Systems   Constitutional: Negative for chills and fever. HENT: Negative for rhinorrhea and sore throat. Eyes: Negative for discharge and redness. Respiratory: Negative for cough, shortness of breath and wheezing. Cardiovascular: Negative for chest pain and palpitations. Gastrointestinal: Positive for abdominal pain. Negative for diarrhea, nausea and vomiting. Genitourinary: Negative for dysuria and frequency. Musculoskeletal: Negative for arthralgias and myalgias. Neurological: Negative for dizziness, light-headedness and headaches. Psychiatric/Behavioral: Negative for sleep disturbance. Objective:     /74   Pulse 67   Ht 5' 9\" (1.753 m)   Wt 237 lb (107.5 kg)   SpO2 96%   BMI 35.00 kg/m²   Physical Exam  Vitals and nursing note reviewed. Constitutional:       Appearance: Normal appearance. Cardiovascular:      Rate and Rhythm: Normal rate and regular rhythm. Heart sounds: Normal heart sounds. Pulmonary:      Effort: Pulmonary effort is normal.      Breath sounds: Normal breath sounds. Neurological:      Mental Status: He is alert and oriented to person, place, and time. Psychiatric:         Mood and Affect: Mood normal.         Assessment:       Diagnosis Orders   1. Calcification of both carotid arteries  VL DUP CAROTID BILATERAL   2. Right upper quadrant abdominal pain  EVERT Valencia MD, General Surgery, [place]   3. Mixed hyperlipidemia  Lipid Panel    ALT    AST   4. Dry mouth  pilocarpine (SALAGEN) 7.5 MG tablet   5.  Anxiety  LORazepam (ATIVAN) 0.5 MG tablet        Plan:    Carotid doppler ordered  Maximize statin to 80mg   REcheck labs in 8 weeks  Reviewed CT of abdomen, CT of chest, colonoscopy and rib x-rays and do not find source for pain  Send to Dr. Dona Yagn for opinion on abdominal pain whether surgical exploration needed. Lorazepam refilled. Sent Salagen 7.5mg again. Return in about 6 months (around 10/12/2022) for recheck--1/2 hour. Orders Placed This Encounter   Procedures    VL DUP CAROTID BILATERAL     Standing Status:   Future     Standing Expiration Date:   4/12/2023    Lipid Panel     Standing Status:   Future     Standing Expiration Date:   4/12/2023     Order Specific Question:   Is Patient Fasting?/# of Hours     Answer:   10-12 hours    ALT     Standing Status:   Future     Standing Expiration Date:   4/12/2023    AST     Standing Status:   Future     Standing Expiration Date:   4/12/2023    AFL - Bhavani Stark MD, General Surgery, [place]     Referral Priority:   Routine     Referral Type:   Eval and Treat     Referral Reason:   Specialty Services Required     Referred to Provider:   Frederick Cai MD     Requested Specialty:   General Surgery     Number of Visits Requested:   1     Orders Placed This Encounter   Medications    LORazepam (ATIVAN) 0.5 MG tablet     Sig: TAKE 1 TABLET BY MOUTH EVERY SIX HOURS AS NEEDED FOR ANXIETY     Dispense:  30 tablet     Refill:  2    atorvastatin (LIPITOR) 80 MG tablet     Sig: TAKE 1 TABLET BY MOUTH ONE TIME A DAY     Dispense:  90 tablet     Refill:  3    pilocarpine (SALAGEN) 7.5 MG tablet     Sig: Take 1 tablet by mouth 3 times daily     Dispense:  90 tablet     Refill:  2       Patient given educational materials - see patient instructions. Discussed use, benefit, and side effects of prescribed medications. All patient questions answered. Pt voiced understanding. Reviewed health maintenance. Instructed to continue current medications, diet and exercise. Patient agreed with treatment plan. Follow up as directed.      Electronically signed by Lisa Healy PA-C on 4/12/2022 at 5:29 PM

## 2022-04-28 DIAGNOSIS — I65.23 CALCIFICATION OF BOTH CAROTID ARTERIES: ICD-10-CM

## 2022-05-03 ENCOUNTER — OFFICE VISIT (OUTPATIENT)
Dept: UROLOGY | Age: 72
End: 2022-05-03
Payer: COMMERCIAL

## 2022-05-03 VITALS
BODY MASS INDEX: 35.1 KG/M2 | SYSTOLIC BLOOD PRESSURE: 124 MMHG | DIASTOLIC BLOOD PRESSURE: 74 MMHG | HEIGHT: 69 IN | TEMPERATURE: 97.3 F | WEIGHT: 237 LBS | RESPIRATION RATE: 17 BRPM | HEART RATE: 89 BPM

## 2022-05-03 DIAGNOSIS — R39.15 URINARY URGENCY: Primary | ICD-10-CM

## 2022-05-03 DIAGNOSIS — N39.43 URINARY DRIBBLING: ICD-10-CM

## 2022-05-03 PROCEDURE — 99214 OFFICE O/P EST MOD 30 MIN: CPT | Performed by: UROLOGY

## 2022-05-03 RX ORDER — TAMSULOSIN HYDROCHLORIDE 0.4 MG/1
0.4 CAPSULE ORAL DAILY
Qty: 90 CAPSULE | Refills: 3 | Status: SHIPPED | OUTPATIENT
Start: 2022-05-03 | End: 2022-09-08 | Stop reason: ALTCHOICE

## 2022-05-03 RX ORDER — MIRABEGRON 50 MG/1
50 TABLET, FILM COATED, EXTENDED RELEASE ORAL DAILY
Qty: 30 TABLET | Refills: 11 | Status: SHIPPED | OUTPATIENT
Start: 2022-05-03

## 2022-05-03 ASSESSMENT — ENCOUNTER SYMPTOMS
COUGH: 0
VOMITING: 0
CONSTIPATION: 0
ABDOMINAL PAIN: 0
SHORTNESS OF BREATH: 0
NAUSEA: 0
BACK PAIN: 0
EYE PAIN: 0
DIARRHEA: 0
WHEEZING: 0

## 2022-05-03 NOTE — PROGRESS NOTES
[address] [address]  Dept: [address] [facility] Urology Office Note - Established    Patient:  Jeff Puente  YOB: 1950  Date: 5/3/2022    The patient is a 66 y.o. male who presents todayfor evaluation of the following problems:   Chief Complaint   Patient presents with    Follow-up     3 months       HPI  He is here in follow up for urinary urgency. He had been on Flomax, which did not help with urgency, so we started [place]. He stopped the Flomax. He has dribbling since stopping the Flomax, but his urgency is much improved. He has some issues with dry mouth. Summary of old records: N/A    Additional History: N/A    Procedures Today: N/A    Urinalysis today:  No results found for this visit on 05/03/22. Last several PSA's:  No results found for: PSA  Last total testosterone:  No results found for: TESTOSTERONE    AUA Symptom Score (5/3/2022):   INCOMPLETE EMPTYING: How often have you had the sensation of not emptying your bladder?: Not at all  FREQUENCY: How often do you have to urinate less than every two hours?: Not at all  INTERMITTENCY: How often have you found you stopped and started again several times when you urinated?: Almost always  URGENCY: How often have you found it difficult to postpone urination?: About Half the time  WEAK STREAM: How often have you had a weak urinary stream?: Not at all  STRAINING: How often have you had to strain to start  urination?: Not at all  NOCTURIA: How many times did you typically get up at night to uriniate?: 3 Times  TOTAL I-PSS SCORE[de-identified] 11       Last BUN and creatinine:  Lab Results   Component Value Date    BUN 11 03/03/2022     Lab Results   Component Value Date    CREATININE 0.77 03/03/2022       Additional Lab/Culture results: none    Imaging Reviewed during this Office Visit: none  (results were independently reviewed by physician and radiology report verified)    PAST MEDICAL, FAMILY AND SOCIAL HISTORY UPDATE:  Past Medical History:   Diagnosis Date    Anxiety     Duodenitis     Fundic gland polyps of stomach, benign     Hyperlipidemia     Hypertension      Past Surgical History:   Procedure Laterality Date    APPENDECTOMY      BACK SURGERY      CHOLECYSTECTOMY      COLONOSCOPY  3/7/2012    tubular adenoma, hyperplastic polyp    COLONOSCOPY  05/28/2019    JOINT REPLACEMENT      hip    OTHER SURGICAL HISTORY  08/29/2016    Spinal stimulator    SPINAL CORD STIMULATOR SURGERY  08/29/2016    UPPER GASTROINTESTINAL ENDOSCOPY  05/28/2019    UPPER GASTROINTESTINAL ENDOSCOPY  10/15/2020     Family History   Problem Relation Age of Onset    Heart Disease Father     Hypertension Father     Cancer Sister         lung cancer    Hypertension Brother     Stroke Brother     Tuberculosis Brother      Outpatient Medications Marked as Taking for the 5/3/22 encounter (Office Visit) with Anna Braga MD   Medication Sig Dispense Refill    tamsulosin (FLOMAX) 0.4 MG capsule Take 1 capsule by mouth daily 90 capsule 3    MYRBETRIQ 50 MG TB24 Take 50 mg by mouth daily 30 tablet 11    mirabegron (MYRBETRIQ) 50 MG TB24 Take 50 mg by mouth daily 28 tablet 0    clotrimazole (MYCELEX) 10 MG sujey Dissolve 1 sujey slowly in mouth five times a day      LORazepam (ATIVAN) 0.5 MG tablet TAKE 1 TABLET BY MOUTH EVERY SIX HOURS AS NEEDED FOR ANXIETY 30 tablet 2    atorvastatin (LIPITOR) 80 MG tablet TAKE 1 TABLET BY MOUTH ONE TIME A DAY 90 tablet 3    pilocarpine (SALAGEN) 7.5 MG tablet Take 1 tablet by mouth 3 times daily 90 tablet 2    carvedilol (COREG) 6.25 MG tablet Take 1 tablet by mouth 2 times daily 90 tablet 1    clopidogrel (PLAVIX) 75 MG tablet TAKE 1 TABLET BY MOUTH EVERY DAY 90 tablet 1    topiramate (TOPAMAX) 50 MG tablet TAKE 1 TABLET BY MOUTH TWO TIMES A  tablet 1    citalopram (CELEXA) 40 MG tablet TAKE 1 TABLET BY MOUTH EVERY DAY 90 tablet 3    dicyclomine (BENTYL) 10 MG capsule Take 1 capsule by mouth 4 times daily as needed (cramping) 120 capsule 0    fluticasone (FLONASE) 50 MCG/ACT nasal spray INHALE 2 SPRAYS IN EACH NOSTRIL ONE TIME A DAY      solifenacin (VESICARE) 5 MG tablet Take 1 tablet by mouth daily 30 tablet 5    ipratropium (ATROVENT) 0.06 % nasal spray INSTILL 2 SPRAYS INTO EACH NOSTRIL UP TO FOUR TIMES A DAY      isosorbide mononitrate (IMDUR) 30 MG extended release tablet TAKE 1 TABLET BY MOUTH EVERY DAY      traZODone (DESYREL) 50 MG tablet TAKE 1 TABLET BY MOUTH AT BEDTIME 90 tablet 1    nitroGLYCERIN (NITROSTAT) 0.4 MG SL tablet Place 1 tablet under the tongue every 5 minutes as needed for Chest pain up to max of 3 total doses.  If no relief after 1 dose, call 911. 25 tablet 3    tamsulosin (FLOMAX) 0.4 MG capsule Take 1 capsule by mouth daily Take one capsule daily to facilitate passage of ureteral stone 30 capsule 6    lisinopril (PRINIVIL;ZESTRIL) 10 MG tablet TAKE 1 TABLET BY MOUTH ONE TIME A DAY 90 tablet 1    omeprazole (PRILOSEC) 20 MG delayed release capsule TAKE 1 CAPSULE BY MOUTH EVERY DAY 90 capsule 1    vitamin D (ERGOCALCIFEROL) 1.25 MG (78062 UT) CAPS capsule Take 1 capsule by mouth once a week 24 capsule 1    budesonide (PULMICORT) 0.5 MG/2ML nebulizer suspension USE 2 VIALS AS DIRECTED IN A SINUS RINSE BOTTLE PER DAY      vitamin B-1 (THIAMINE) 100 MG tablet Take 100 mg by mouth daily      allopurinol (ZYLOPRIM) 300 MG tablet TAKE 1 TABLET BY MOUTH ONE TIME A DAY  90 tablet 3    OnabotulinumtoxinA (BOTOX IJ) Inject as directed Patient is taking for migraines      calcium carbonate (OSCAL) 500 MG TABS tablet Take 500 mg by mouth daily      magnesium 30 MG tablet Take 30 mg by mouth 2 times daily      Galcanezumab-gnlm (EMGALITY) 120 MG/ML SOAJ Inject into the skin      Acetaminophen (TYLENOL EXTRA STRENGTH PO) Take by mouth Indications: prn      POTASSIUM GLUCONATE PO Take by mouth daily         Cupric oxide, Chrome alum, Cobalt, Copper-containing compounds, and Nickel  Social History     Tobacco Use   Smoking Status Former Smoker    Packs/day: 2.00    Years: 35.00    Pack years: 70.00    Types: Cigarettes    Quit date: 2005    Years since quittin.4   Smokeless Tobacco Former User    Types: Chew     (Ifpatient a smoker, smoking cessation counseling offered)    Social History     Substance and Sexual Activity   Alcohol Use Yes    Alcohol/week: 35.0 - 42.0 standard drinks    Types: 35 - 42 Cans of beer per week    Comment: 4-5 beers a day       REVIEW OF SYSTEMS:  Review of Systems    Physical Exam:      Vitals:    22 1021   BP: 124/74   Pulse: 89   Resp: 17   Temp: 97.3 °F (36.3 °C)     Body mass index is 35 kg/m². Patient is a 66 y.o. male in no acute distress and alert and oriented to person, place and time. Physical Exam  Constitutional: Patient in no acute distress. Neuro: Alert and oriented to person, place and time. Psych: Mood normal, affect normal  Lungs: Respiratory effort is normal  Cardiovascular: Warm & Pink  Abdomen: Soft, non-tender, non-distended with no CVA,  No flank tenderness,  Or hepatosplenomegaly   Lymphatics: No palpablelymphadenopathy. Bladder non-tender and not distended. Musculoskeletal: Normal gait and station      Assessment and Plan      1. Urinary urgency    2. Urinary dribbling           Plan:          He saw a big improvement in urgency with Gemtesa. Will try Myrbetriq due to cost.   Re-start Flomax for dribbling. Return in about 6 months (around 11/3/2022).     Prescriptions Ordered:  Orders Placed This Encounter   Medications    tamsulosin (FLOMAX) 0.4 MG capsule     Sig: Take 1 capsule by mouth daily     Dispense:  90 capsule     Refill:  3    MYRBETRIQ 50 MG TB24     Sig: Take 50 mg by mouth daily     Dispense:  30 tablet     Refill:  11    mirabegron (MYRBETRIQ) 50 MG TB24     Sig: Take 50 mg by mouth daily     Dispense:  28 tablet     Refill:  0     Orders Placed:  No orders of the defined types were placed in this encounter. Jesús Townsend MD    Agree with the ROS entered by the MA.

## 2022-05-03 NOTE — LETTER
[address] [facility]  [address] [facility] [address]  Dept: [phone]  Dept Fax: [phone]        5/3/22    Patient: David Acevedo  YOB: 1950    Dear Erin Day PA-C,    I had the pleasure of seeing one of your patients, Joycelyn Barber today in the office today. Below are the relevant portions of my assessment and plan of care. IMPRESSION:  1. Urinary urgency    2. Urinary dribbling        PLAN:  He saw a big improvement in urgency with Gemtesa. Will try Myrbetriq due to cost.   Re-start Flomax for dribbling. Return in about 6 months (around 11/3/2022). Prescriptions Ordered:  Orders Placed This Encounter   Medications    tamsulosin (FLOMAX) 0.4 MG capsule     Sig: Take 1 capsule by mouth daily     Dispense:  90 capsule     Refill:  3    MYRBETRIQ 50 MG TB24     Sig: Take 50 mg by mouth daily     Dispense:  30 tablet     Refill:  11    mirabegron (MYRBETRIQ) 50 MG TB24     Sig: Take 50 mg by mouth daily     Dispense:  28 tablet     Refill:  0     Orders Placed:  No orders of the defined types were placed in this encounter. Thank you for allowing me to participate in the care of this patient. I will keep you updated on this patient's follow up and I look forward to serving you and your patients again in the future.         Anthony Jacinto MD

## 2022-05-03 NOTE — PROGRESS NOTES
Review of Systems   Constitutional: Negative for appetite change, chills, fatigue and fever. Eyes: Negative for pain and visual disturbance. Respiratory: Negative for cough, shortness of breath and wheezing. Cardiovascular: Negative for chest pain and leg swelling. Gastrointestinal: Negative for abdominal pain, constipation, diarrhea, nausea and vomiting. Genitourinary: Negative for difficulty urinating, dysuria, frequency, hematuria, penile pain and testicular pain. Musculoskeletal: Negative for back pain and myalgias. Neurological: Negative for dizziness, tremors, weakness, light-headedness, numbness and headaches. Hematological: Negative for adenopathy. Does not bruise/bleed easily.    Patient states dribbling when urinating

## 2022-05-05 ENCOUNTER — OFFICE VISIT (OUTPATIENT)
Dept: PODIATRY | Age: 72
End: 2022-05-05
Payer: COMMERCIAL

## 2022-05-05 VITALS — BODY MASS INDEX: 35.1 KG/M2 | HEIGHT: 69 IN | WEIGHT: 237 LBS

## 2022-05-05 DIAGNOSIS — I73.9 PVD (PERIPHERAL VASCULAR DISEASE) (HCC): ICD-10-CM

## 2022-05-05 DIAGNOSIS — L60.0 INGROWN NAIL: ICD-10-CM

## 2022-05-05 DIAGNOSIS — B35.1 DERMATOPHYTOSIS OF NAIL: Primary | ICD-10-CM

## 2022-05-05 DIAGNOSIS — M79.604 BILATERAL LOWER EXTREMITY PAIN: ICD-10-CM

## 2022-05-05 DIAGNOSIS — M79.605 BILATERAL LOWER EXTREMITY PAIN: ICD-10-CM

## 2022-05-05 PROCEDURE — 99999 PR OFFICE/OUTPT VISIT,PROCEDURE ONLY: CPT | Performed by: PODIATRIST

## 2022-05-05 PROCEDURE — 11721 DEBRIDE NAIL 6 OR MORE: CPT | Performed by: PODIATRIST

## 2022-05-05 NOTE — PROGRESS NOTES
[address] [facility] [address] [address] [address]  Dept: [phone]     PAIN PROGRESS NOTE  Date of patient's visit: 5/5/2022  Patient's Name:  Olivia Larson YOB: 1950            Patient Care Team:  Thad Ortega PA-C as PCP - General (Physician Assistant)  Thad Ortega PA-C as PCP - [facility] Empaneled Provider  Erich Austin MD as Consulting Physician (Gastroenterology)  Allen Gill MD as Consulting Physician [facility] Medicine)      Chief Complaint   Patient presents with    Nail Problem       Subjective: This Olivia Larson comes to clinic for foot and nail care. Pt currently has complaint of thickened, painful, elongated nails that he/she cannot manage by themselves. Pt. Relates pain to nails with shoe gear. Pt's primary care physician is Thad Ortega PA-C last seen 08/03/2021.     Past Medical History:   Diagnosis Date    Anxiety     Duodenitis     Fundic gland polyps of stomach, benign     Hyperlipidemia     Hypertension        Allergies   Allergen Reactions    Cupric Oxide     Chrome Alum     Cobalt     Copper-Containing Compounds     Nickel      Current Outpatient Medications on File Prior to Visit   Medication Sig Dispense Refill    tamsulosin (FLOMAX) 0.4 MG capsule Take 1 capsule by mouth daily 90 capsule 3    MYRBETRIQ 50 MG TB24 Take 50 mg by mouth daily 30 tablet 11    mirabegron (MYRBETRIQ) 50 MG TB24 Take 50 mg by mouth daily 28 tablet 0    clotrimazole (MYCELEX) 10 MG sujey Dissolve 1 sujey slowly in mouth five times a day      LORazepam (ATIVAN) 0.5 MG tablet TAKE 1 TABLET BY MOUTH EVERY SIX HOURS AS NEEDED FOR ANXIETY 30 tablet 2    atorvastatin (LIPITOR) 80 MG tablet TAKE 1 TABLET BY MOUTH ONE TIME A DAY 90 tablet 3    pilocarpine (SALAGEN) 7.5 MG tablet Take 1 tablet by mouth 3 times daily 90 tablet 2    carvedilol (COREG) 6.25 MG tablet Take 1 tablet by mouth 2 times daily 90 tablet 1    clopidogrel (PLAVIX) 75 MG tablet TAKE 1 TABLET BY MOUTH EVERY DAY 90 tablet 1    topiramate (TOPAMAX) 50 MG tablet TAKE 1 TABLET BY MOUTH TWO TIMES A  tablet 1    citalopram (CELEXA) 40 MG tablet TAKE 1 TABLET BY MOUTH EVERY DAY 90 tablet 3    dicyclomine (BENTYL) 10 MG capsule Take 1 capsule by mouth 4 times daily as needed (cramping) 120 capsule 0    fluticasone (FLONASE) 50 MCG/ACT nasal spray INHALE 2 SPRAYS IN EACH NOSTRIL ONE TIME A DAY      solifenacin (VESICARE) 5 MG tablet Take 1 tablet by mouth daily 30 tablet 5    ipratropium (ATROVENT) 0.06 % nasal spray INSTILL 2 SPRAYS INTO EACH NOSTRIL UP TO FOUR TIMES A DAY      isosorbide mononitrate (IMDUR) 30 MG extended release tablet TAKE 1 TABLET BY MOUTH EVERY DAY      traZODone (DESYREL) 50 MG tablet TAKE 1 TABLET BY MOUTH AT BEDTIME 90 tablet 1    nitroGLYCERIN (NITROSTAT) 0.4 MG SL tablet Place 1 tablet under the tongue every 5 minutes as needed for Chest pain up to max of 3 total doses.  If no relief after 1 dose, call 911. 25 tablet 3    tamsulosin (FLOMAX) 0.4 MG capsule Take 1 capsule by mouth daily Take one capsule daily to facilitate passage of ureteral stone 30 capsule 6    lisinopril (PRINIVIL;ZESTRIL) 10 MG tablet TAKE 1 TABLET BY MOUTH ONE TIME A DAY 90 tablet 1    omeprazole (PRILOSEC) 20 MG delayed release capsule TAKE 1 CAPSULE BY MOUTH EVERY DAY 90 capsule 1    vitamin D (ERGOCALCIFEROL) 1.25 MG (51706 UT) CAPS capsule Take 1 capsule by mouth once a week 24 capsule 1    budesonide (PULMICORT) 0.5 MG/2ML nebulizer suspension USE 2 VIALS AS DIRECTED IN A SINUS RINSE BOTTLE PER DAY      vitamin B-1 (THIAMINE) 100 MG tablet Take 100 mg by mouth daily      allopurinol (ZYLOPRIM) 300 MG tablet TAKE 1 TABLET BY MOUTH ONE TIME A DAY  90 tablet 3    OnabotulinumtoxinA (BOTOX IJ) Inject as directed Patient is taking for migraines      calcium carbonate (OSCAL) 500 MG TABS tablet Take 500 mg by mouth daily      magnesium 30 MG tablet Take 30 mg by mouth 2 times daily      Galcanezumab-gnlm (EMGALITY) 120 MG/ML SOAJ Inject into the skin      Acetaminophen (TYLENOL EXTRA STRENGTH PO) Take by mouth Indications: prn      POTASSIUM GLUCONATE PO Take by mouth daily      pregabalin (LYRICA) 50 MG capsule TAKE 1 CAPSULE BY MOUTH THREE TIMES A DAY 90 capsule 2     No current facility-administered medications on file prior to visit. .    Review of Systems:   History obtained from chart review and the patient  General ROS: negative for - chills, fatigue, fever, night sweats or weight gain  Constitutional: Negative for chills, diaphoresis, fatigue, fever and unexpected weight change. Musculoskeletal: Positive for arthralgias, gait problem and joint swelling. Neurological ROS: negative for - behavioral changes, confusion, headaches or seizures. Negative for weakness and numbness. Dermatological ROS: negative for - mole changes, rash  Cardiovascular: Negative for leg swelling. Gastrointestinal: Negative for constipation, diarrhea, nausea and vomiting. Objective:  Dermatologic Exam:  Skin lesion/ulceration Absent . Skin No rashes or nodules noted. .   Skin is thin, with flaky sloughing skin as well as decreased hair growth to the lower leg  Small red hemosiderin deposits seen dorsal foot   Musculoskeletal:     1st MPJ ROM decreased, Bilateral.  Muscle strength 5/5, Bilateral.  Pain present upon palpation of toenails 1-5, Bilateral. decreased medial longitudinal arch, Bilateral.  Ankle ROM decreased,Bilateral.    Dorsally contracted digits present digits 2, Bilateral.     Vascular: DP pulses 1/4 bilateral.  PT pulses 0/4 bilateral.   CFT <5 seconds, Bilateral.  Hair growth absent to the level of the digits, Bilateral.  Edema present, Bilateral.  Varicosities absent, Bilateral. Erythema absent, Bilateral    Neurological: Sensation diminshed to light touch to level of digits, Bilateral.  Protective sensation intact 6/10 sites via 5.07/10g Mcallen-Ashlee Monofilament, Bilateral.  negative Tinel's, Bilateral.  negative Valleix sign, Bilateral.      Integument: Warm, dry, supple, Bilateral.  Open lesion absent, Bilateral.  Interdigital maceration absent to web spaces 4, Bilateral.  Nails 1-5 left and 1-5 right thickened > 3.0 mm, dystrophic and crumbly, discolored with subungual debris. Fissures absent, Bilateral.   General: AAO x 3 in NAD. Derm  Toenail Description  Sites of Onychomycosis Involvement (Check affected area)  [x] [x] [x] [x] [x] [x] [x] [x] [x] [x]  5 4 3 2 1 1 2 3 4 5                          Right                                        Left    Thickness  [x] [x] [x] [x] [x] [x] [x] [x] [x] [x]  5 4 3 2 1 1 2 3 4 5                         Right                                        Left    Dystrophic Changes   [x] [x] [x] [x] [x] [x] [x] [x] [x] [x]  5 4 3 2 1 1 2 3 4 5                         Right                                        Left    Color  [x] [x] [x] [x] [x] [x] [x] [x] [x] [x]  5 4 3 2 1 1 2 3 4 5                          Right                                        Left    Incurvation/Ingrowin   [] [] [] [] [] [] [] [] [] []  5 4 3 2 1 1 2 3 4 5                         Right                                        Left    Inflammation/Pain   [x] [x] [x] [x] [x] [x] [x] [x] [x] [x]  5 4 3  2 1 1 2 3 4 5                         Right                                        Left       Nails are painful 1-10 with direct palpation. Q7   []Yes  []No                Q8   [x]Yes  []No                     Q9   []Yes    []No  Assessment:  66 y.o. male with:    Diagnosis Orders   1. Dermatophytosis of nail  41333 - [place] DEBRIDEMENT OF NAILS, 6 OR MORE   2. Bilateral lower extremity pain  34069 - [place] DEBRIDEMENT OF NAILS, 6 OR MORE   3. PVD (peripheral vascular disease) ([facility] [address]  [address]251 - [place] DEBRIDEMENT OF NAILS, 6 OR MORE   4.  Ingrown nail  55094 - [place] DEBRIDEMENT OF NAILS, 6 OR MORE           Plan:   Pt was evaluated and examined. Patient was given personalized discharge instructions. Nails 1-10 were debrided in length and thickness sharply with a nail nipper and  without incident. Pt will follow up in 9 weeks or sooner if any problems arise. Diagnosis was discussed with the pt and all of their questions were answered in detail. Proper foot hygiene and care was discussed with the pt. Patient to check feet daily and contact the office with any questions/problems/concerns. Other comorbidity noted and will be managed by PCP. Pain waiver discussed with patient and confirmed.    5/5/2022      Electronically signed by Shannan Regalado DPM on 5/5/2022 at 9:44 AM  5/5/2022

## 2022-05-16 ENCOUNTER — TELEPHONE (OUTPATIENT)
Dept: PRIMARY CARE CLINIC | Age: 72
End: 2022-05-16

## 2022-05-16 DIAGNOSIS — R06.02 SHORTNESS OF BREATH: ICD-10-CM

## 2022-05-16 DIAGNOSIS — I73.9 PAD (PERIPHERAL ARTERY DISEASE) (HCC): ICD-10-CM

## 2022-05-16 DIAGNOSIS — M51.36 DISC DEGENERATION, LUMBAR: Primary | ICD-10-CM

## 2022-05-16 DIAGNOSIS — R20.2 PARESTHESIA OF LEFT LEG: ICD-10-CM

## 2022-05-16 DIAGNOSIS — J47.9 BRONCHIECTASIS WITHOUT COMPLICATION (HCC): ICD-10-CM

## 2022-05-16 NOTE — TELEPHONE ENCOUNTER
Patient wife called asking if Sheila Toussaint could put in order for mobility scooter.  Patient has purchased scooter already, needing order for tax purposes per Hern[address]    Patient would like this faxed to [phone]    Please call when completed

## 2022-05-16 NOTE — TELEPHONE ENCOUNTER
I can order this but he will need to have an appt to show a face to face appointment for the documentation that they will ask for to cover it.

## 2022-05-17 DIAGNOSIS — M51.36 DISC DEGENERATION, LUMBAR: ICD-10-CM

## 2022-05-17 DIAGNOSIS — M79.605 LEG PAIN, LATERAL, LEFT: ICD-10-CM

## 2022-05-17 DIAGNOSIS — R20.2 PARESTHESIA OF LEFT LEG: ICD-10-CM

## 2022-05-17 RX ORDER — PREGABALIN 50 MG/1
CAPSULE ORAL
Qty: 90 CAPSULE | Refills: 1 | Status: SHIPPED | OUTPATIENT
Start: 2022-05-17 | End: 2022-08-23

## 2022-05-17 NOTE — TELEPHONE ENCOUNTER
Patient's wife is stating that they already bought the scooter and needs a order stating that for one so that they can get the taxes waived . The taxes on it is 190+ .

## 2022-05-20 RX ORDER — ATORVASTATIN CALCIUM 80 MG/1
TABLET, FILM COATED ORAL
Qty: 90 TABLET | Refills: 3 | Status: SHIPPED | OUTPATIENT
Start: 2022-05-20

## 2022-06-04 DIAGNOSIS — K20.80 CORROSIVE ESOPHAGITIS: ICD-10-CM

## 2022-06-04 DIAGNOSIS — I10 ESSENTIAL HYPERTENSION: ICD-10-CM

## 2022-06-06 RX ORDER — PILOCARPINE HYDROCHLORIDE 5 MG/1
TABLET, FILM COATED ORAL
Qty: 90 TABLET | Refills: 0 | OUTPATIENT
Start: 2022-06-06

## 2022-06-06 RX ORDER — ALLOPURINOL 300 MG/1
TABLET ORAL
Qty: 90 TABLET | Refills: 0 | Status: SHIPPED | OUTPATIENT
Start: 2022-06-06 | End: 2022-06-08

## 2022-06-06 RX ORDER — OMEPRAZOLE 20 MG/1
CAPSULE, DELAYED RELEASE ORAL
Qty: 90 CAPSULE | Refills: 0 | Status: SHIPPED | OUTPATIENT
Start: 2022-06-06 | End: 2022-06-08

## 2022-06-06 RX ORDER — TRAZODONE HYDROCHLORIDE 50 MG/1
TABLET ORAL
Qty: 90 TABLET | Refills: 0 | Status: SHIPPED | OUTPATIENT
Start: 2022-06-06 | End: 2022-09-06

## 2022-06-06 RX ORDER — CARVEDILOL 6.25 MG/1
TABLET ORAL
Qty: 90 TABLET | Refills: 0 | Status: SHIPPED | OUTPATIENT
Start: 2022-06-06 | End: 2022-06-07 | Stop reason: SDUPTHER

## 2022-06-07 DIAGNOSIS — I10 ESSENTIAL HYPERTENSION: ICD-10-CM

## 2022-06-07 RX ORDER — CARVEDILOL 6.25 MG/1
TABLET ORAL
Qty: 180 TABLET | Refills: 1 | Status: SHIPPED | OUTPATIENT
Start: 2022-06-07

## 2022-06-08 DIAGNOSIS — K20.80 CORROSIVE ESOPHAGITIS: ICD-10-CM

## 2022-06-08 RX ORDER — OMEPRAZOLE 20 MG/1
CAPSULE, DELAYED RELEASE ORAL
Qty: 90 CAPSULE | Refills: 1 | Status: SHIPPED | OUTPATIENT
Start: 2022-06-08

## 2022-06-08 RX ORDER — ALLOPURINOL 300 MG/1
TABLET ORAL
Qty: 90 TABLET | Refills: 1 | Status: SHIPPED | OUTPATIENT
Start: 2022-06-08

## 2022-06-13 RX ORDER — LISINOPRIL 10 MG/1
TABLET ORAL
Qty: 90 TABLET | Refills: 1 | Status: SHIPPED | OUTPATIENT
Start: 2022-06-13

## 2022-06-15 ENCOUNTER — TELEPHONE (OUTPATIENT)
Dept: PRIMARY CARE CLINIC | Age: 72
End: 2022-06-15

## 2022-06-15 RX ORDER — PILOCARPINE HYDROCHLORIDE 5 MG/1
5 TABLET, FILM COATED ORAL 3 TIMES DAILY
Qty: 90 TABLET | Refills: 2 | Status: SHIPPED | OUTPATIENT
Start: 2022-06-15

## 2022-06-15 RX ORDER — PILOCARPINE HYDROCHLORIDE 5 MG/1
TABLET, FILM COATED ORAL
Qty: 90 TABLET | Refills: 0 | OUTPATIENT
Start: 2022-06-15

## 2022-07-07 ENCOUNTER — OFFICE VISIT (OUTPATIENT)
Dept: PODIATRY | Age: 72
End: 2022-07-07
Payer: COMMERCIAL

## 2022-07-07 VITALS — BODY MASS INDEX: 35.1 KG/M2 | WEIGHT: 237 LBS | HEIGHT: 69 IN

## 2022-07-07 DIAGNOSIS — B35.1 DERMATOPHYTOSIS OF NAIL: Primary | ICD-10-CM

## 2022-07-07 DIAGNOSIS — L60.0 INGROWN NAIL: ICD-10-CM

## 2022-07-07 DIAGNOSIS — I73.9 PVD (PERIPHERAL VASCULAR DISEASE) (HCC): ICD-10-CM

## 2022-07-07 DIAGNOSIS — M79.604 BILATERAL LOWER EXTREMITY PAIN: ICD-10-CM

## 2022-07-07 DIAGNOSIS — M79.605 BILATERAL LOWER EXTREMITY PAIN: ICD-10-CM

## 2022-07-07 PROCEDURE — 99999 PR OFFICE/OUTPT VISIT,PROCEDURE ONLY: CPT | Performed by: PODIATRIST

## 2022-07-07 PROCEDURE — 11721 DEBRIDE NAIL 6 OR MORE: CPT | Performed by: PODIATRIST

## 2022-07-07 NOTE — PROGRESS NOTES
[address].  Dept: [phone]     PAIN PROGRESS NOTE  Date of patient's visit: 7/7/2022  Patient's Name:  Barak Lugo YOB: 1950            Patient Care Team:  Leslie Cerda PA-C as PCP - General (Physician Assistant)  Leslie Cerda PA-C as PCP - [address] [place] [address] [facility]  Roxy Lopez MD as Consulting Physician (Gastroenterology)  Isabelle Crystal MD as Consulting Physician [facility] Medicine)      Chief Complaint   Patient presents with    Nail Problem    Foot Pain    Peripheral Neuropathy       Subjective: This Barak Lugo comes to clinic for foot and nail care. Pt currently has complaint of thickened, painful, elongated nails that he/she cannot manage by themselves. Pt. Relates pain to nails with shoe gear. Pt's primary care physician is Jemima Brown last seen April 12 2022.     Past Medical History:   Diagnosis Date    Anxiety     Duodenitis     Fundic gland polyps of stomach, benign     Hyperlipidemia     Hypertension        Allergies   Allergen Reactions    Cupric Oxide     Chrome Alum     Cobalt     Copper-Containing Compounds     Nickel      Current Outpatient Medications on File Prior to Visit   Medication Sig Dispense Refill    pilocarpine (SALAGEN) 5 MG tablet Take 1 tablet by mouth 3 times daily 90 tablet 2    lisinopril (PRINIVIL;ZESTRIL) 10 MG tablet TAKE 1 TABLET BY MOUTH EVERY DAY 90 tablet 1    allopurinol (ZYLOPRIM) 300 MG tablet TAKE 1 TABLET BY MOUTH EVERY DAY 90 tablet 1    omeprazole (PRILOSEC) 20 MG delayed release capsule TAKE 1 CAPSULE BY MOUTH EVERY DAY 90 capsule 1    carvedilol (COREG) 6.25 MG tablet TAKE 1 TABLET BY MOUTH TWO TIMES A  tablet 1    traZODone (DESYREL) 50 MG tablet TAKE 1 TABLET BY MOUTH AT BEDTIME 90 tablet 0    atorvastatin (LIPITOR) 80 MG tablet TAKE 1 TABLET BY MOUTH ONE TIME A DAY 90 tablet 3    tamsulosin (FLOMAX) 0.4 MG capsule Take 1 capsule by mouth daily 90 capsule 3    MYRBETRIQ 50 MG TB24 Take 50 mg by mouth daily 30 tablet 11    mirabegron (MYRBETRIQ) 50 MG TB24 Take 50 mg by mouth daily 28 tablet 0    clotrimazole (MYCELEX) 10 MG sujey Dissolve 1 sujey slowly in mouth five times a day      clopidogrel (PLAVIX) 75 MG tablet TAKE 1 TABLET BY MOUTH EVERY DAY 90 tablet 1    topiramate (TOPAMAX) 50 MG tablet TAKE 1 TABLET BY MOUTH TWO TIMES A  tablet 1    citalopram (CELEXA) 40 MG tablet TAKE 1 TABLET BY MOUTH EVERY DAY 90 tablet 3    dicyclomine (BENTYL) 10 MG capsule Take 1 capsule by mouth 4 times daily as needed (cramping) 120 capsule 0    fluticasone (FLONASE) 50 MCG/ACT nasal spray INHALE 2 SPRAYS IN EACH NOSTRIL ONE TIME A DAY      solifenacin (VESICARE) 5 MG tablet Take 1 tablet by mouth daily 30 tablet 5    ipratropium (ATROVENT) 0.06 % nasal spray INSTILL 2 SPRAYS INTO EACH NOSTRIL UP TO FOUR TIMES A DAY      isosorbide mononitrate (IMDUR) 30 MG extended release tablet TAKE 1 TABLET BY MOUTH EVERY DAY      nitroGLYCERIN (NITROSTAT) 0.4 MG SL tablet Place 1 tablet under the tongue every 5 minutes as needed for Chest pain up to max of 3 total doses.  If no relief after 1 dose, call 911. 25 tablet 3    tamsulosin (FLOMAX) 0.4 MG capsule Take 1 capsule by mouth daily Take one capsule daily to facilitate passage of ureteral stone 30 capsule 6    vitamin D (ERGOCALCIFEROL) 1.25 MG (86014 UT) CAPS capsule Take 1 capsule by mouth once a week 24 capsule 1    budesonide (PULMICORT) 0.5 MG/2ML nebulizer suspension USE 2 VIALS AS DIRECTED IN A SINUS RINSE BOTTLE PER DAY      vitamin B-1 (THIAMINE) 100 MG tablet Take 100 mg by mouth daily      OnabotulinumtoxinA (BOTOX IJ) Inject as directed Patient is taking for migraines      calcium carbonate (OSCAL) 500 MG TABS tablet Take 500 mg by mouth daily      magnesium 30 MG tablet Take 30 mg by mouth 2 times daily      Galcanezumab-gnlm (EMGALITY) 120 MG/ML SOAJ Inject into the skin      Acetaminophen (TYLENOL EXTRA STRENGTH PO) Take by mouth Indications: prn      POTASSIUM GLUCONATE PO Take by mouth daily      pregabalin (LYRICA) 50 MG capsule TAKE 1 CAPSULE BY MOUTH THREE TIMES A DAY 90 capsule 1     No current facility-administered medications on file prior to visit. Review of Systems. Review of Systems:   History obtained from chart review and the patient  General ROS: negative for - chills, fatigue, fever, night sweats or weight gain  Constitutional: Negative for chills, diaphoresis, fatigue, fever and unexpected weight change. Musculoskeletal: Positive for arthralgias, gait problem and joint swelling. Neurological ROS: negative for - behavioral changes, confusion, headaches or seizures. Negative for weakness and numbness. Dermatological ROS: negative for - mole changes, rash  Cardiovascular: Negative for leg swelling. Gastrointestinal: Negative for constipation, diarrhea, nausea and vomiting. Objective:  Dermatologic Exam:  Skin lesion/ulceration Absent . Skin No rashes or nodules noted. .   Skin is thin, with flaky sloughing skin as well as decreased hair growth to the lower leg  Small red hemosiderin deposits seen dorsal foot   Musculoskeletal:     1st MPJ ROM decreased, Bilateral.  Muscle strength 5/5, Bilateral.  Pain present upon palpation of toenails 1-5, Bilateral. decreased medial longitudinal arch, Bilateral.  Ankle ROM decreased,Bilateral.    Dorsally contracted digits present digits 2, Bilateral.     Vascular: DP pulses 1/4 bilateral.  PT pulses 0/4 bilateral.   CFT <5 seconds, Bilateral.  Hair growth absent to the level of the digits, Bilateral.  Edema present, Bilateral.  Varicosities absent, Bilateral. Erythema absent, Bilateral    Neurological: Sensation diminshed to light touch to level of digits, Bilateral.  Protective sensation intact 6/10 sites via 5.07/10g [place]-Ashlee Monofilament, Bilateral.  negative Tinel's, Bilateral.  negative Valleix sign, Bilateral.      Integument: Warm, dry, supple, Bilateral.  Open lesion absent, Bilateral.  Interdigital maceration absent to web spaces 4, Bilateral.  Nails 1-5 left and 1-5 right thickened > 3.0 mm, dystrophic and crumbly, discolored with subungual debris. Fissures absent, Bilateral.   General: AAO x 3 in NAD. Derm  Toenail Description  Sites of Onychomycosis Involvement (Check affected area)  [x] [x] [x] [x] [x] [x] [x] [x] [x] [x]  5 4 3 2 1 1 2 3 4 5                          Right                                        Left    Thickness  [x] [x] [x] [x] [x] [x] [x] [x] [x] [x]  5 4 3 2 1 1 2 3 4 5                         Right                                        Left    Dystrophic Changes   [x] [x] [x] [x] [x] [x] [x] [x] [x] [x]  5 4 3 2 1 1 2 3 4 5                         Right                                        Left    Color  [x] [x] [x] [x] [x] [x] [x] [x] [x] [x]  5 4 3 2 1 1 2 3 4 5                          Right                                        Left    Incurvation/Ingrowin   [] [] [] [] [] [] [] [] [] []  5 4 3 2 1 1 2 3 4 5                         Right                                        Left    Inflammation/Pain   [x] [x] [x] [x] [x] [x] [x] [x] [x] [x]  5 4 3  2 1 1 2 3 4 5                         Right                                        Left       Nails are painful 1-10 with direct palpation. Q7   []Yes  []No                Q8   [x]Yes  []No                     Q9   []Yes    []No  Assessment:  66 y.o. male with:    Diagnosis Orders   1. Dermatophytosis of nail  03568 - MA DEBRIDEMENT OF NAILS, 6 OR MORE   2. Bilateral lower extremity pain  33232 - MA DEBRIDEMENT OF NAILS, 6 OR MORE   3. PVD (peripheral vascular disease) ([facility] [address]  38591 - MA DEBRIDEMENT OF NAILS, 6 OR MORE   4.  Ingrown nail  49251 - MA DEBRIDEMENT OF NAILS, 6 OR MORE         Plan:   Pt was evaluated and examined. Patient was given personalized discharge instructions. Nails 1-10 were debrided in length and thickness sharply with a nail nipper and  without incident. Pt will follow up in 9 weeks or sooner if any problems arise. Diagnosis was discussed with the pt and all of their questions were answered in detail. Proper foot hygiene and care was discussed with the pt. Patient to check feet daily and contact the office with any questions/problems/concerns. Other comorbidity noted and will be managed by PCP. Pain waiver discussed with patient and confirmed.    7/7/2022      Electronically signed by Karen Carreno DPM on 7/7/2022 at 10:02 AM  7/7/2022

## 2022-07-26 ENCOUNTER — OFFICE VISIT (OUTPATIENT)
Dept: GASTROENTEROLOGY | Age: 72
End: 2022-07-26
Payer: COMMERCIAL

## 2022-07-26 ENCOUNTER — TELEPHONE (OUTPATIENT)
Dept: GASTROENTEROLOGY | Age: 72
End: 2022-07-26

## 2022-07-26 VITALS
BODY MASS INDEX: 35.59 KG/M2 | DIASTOLIC BLOOD PRESSURE: 75 MMHG | WEIGHT: 241 LBS | HEART RATE: 70 BPM | SYSTOLIC BLOOD PRESSURE: 134 MMHG

## 2022-07-26 DIAGNOSIS — K58.0 IRRITABLE BOWEL SYNDROME WITH DIARRHEA: ICD-10-CM

## 2022-07-26 DIAGNOSIS — K52.9 CHRONIC DIARRHEA: ICD-10-CM

## 2022-07-26 DIAGNOSIS — K31.7 GASTRIC POLYP: Primary | ICD-10-CM

## 2022-07-26 PROCEDURE — 99214 OFFICE O/P EST MOD 30 MIN: CPT | Performed by: INTERNAL MEDICINE

## 2022-07-26 PROCEDURE — 1123F ACP DISCUSS/DSCN MKR DOCD: CPT | Performed by: INTERNAL MEDICINE

## 2022-07-26 RX ORDER — ASPIRIN 81 MG/1
81 TABLET ORAL DAILY
COMMUNITY

## 2022-07-26 ASSESSMENT — ENCOUNTER SYMPTOMS
ABDOMINAL PAIN: 1
ABDOMINAL DISTENTION: 1
ALLERGIC/IMMUNOLOGIC NEGATIVE: 1
RESPIRATORY NEGATIVE: 1
CONSTIPATION: 1

## 2022-07-26 NOTE — TELEPHONE ENCOUNTER
Called [facility] and spoke to Maynor Silverio and informed that the patient had his second booster and to cancel covid testing. Writer thanked and call ended. Take vaccine card.

## 2022-07-26 NOTE — PROGRESS NOTES
GI OFFICE FOLLOW UP    INTERVAL HISTORY:   Wilhemina Olszewski, MD  [address] [place] [address] Dr Darling Sanchez [facility],  [address] [place] [address]    Chief Complaint   Patient presents with    Abdominal Pain     Pt here for abd pain in his right side, had a ct done 4/1/22        1. Gastric polyp    2. Chronic diarrhea    3. Irritable bowel syndrome with diarrhea              HISTORY OF PRESENT ILLNESS:   Seen along with his wife. Last time he was seen by me was about a year ago. At present he stated that the diarrhea resolved. Still has abdominal bloating intermittent nausea. Has a vague nonspecific pain in the right lateral abdominal wall. Has abdominal cramps. Does not have weight loss. No emesis. Previous records reviewed and work-up noted. In the past patient had a gastric polyp with dysplasia. He has a chronic back issues. Medications reviewed. He has been taking Bentyl. Patient does have stress/anxiety. He is on trazodone, Celexa, looks like these are helping his stress and anxiety. No melena or hematochezia. No dysphagia. Past Medical,Family, and Social History reviewed and does contribute to the patient presenting condition. Patient's PMH/PSH,SH,PSYCH Hx, MEDs, ALLERGIES, and ROS were all reviewed and updated in the appropriate sections.  Yes      PAST MEDICAL HISTORY:  Past Medical History:   Diagnosis Date    Anxiety     Duodenitis     Fundic gland polyps of stomach, benign     Hyperlipidemia     Hypertension        Past Surgical History:   Procedure Laterality Date    APPENDECTOMY      BACK SURGERY      CHOLECYSTECTOMY      COLONOSCOPY  3/7/2012    tubular adenoma, hyperplastic polyp    COLONOSCOPY  05/28/2019    JOINT REPLACEMENT      hip    OTHER SURGICAL HISTORY  08/29/2016    Spinal stimulator    SPINAL CORD STIMULATOR SURGERY  08/29/2016    UPPER GASTROINTESTINAL ENDOSCOPY  05/28/2019    UPPER GASTROINTESTINAL ENDOSCOPY  10/15/2020       CURRENT MEDICATIONS:    Current Outpatient Medications:     aspirin (ASPIRIN 81) 81 MG EC tablet, Take 81 mg by mouth in the morning., Disp: , Rfl:     pilocarpine (SALAGEN) 5 MG tablet, Take 1 tablet by mouth 3 times daily, Disp: 90 tablet, Rfl: 2    lisinopril (PRINIVIL;ZESTRIL) 10 MG tablet, TAKE 1 TABLET BY MOUTH EVERY DAY, Disp: 90 tablet, Rfl: 1    allopurinol (ZYLOPRIM) 300 MG tablet, TAKE 1 TABLET BY MOUTH EVERY DAY, Disp: 90 tablet, Rfl: 1    omeprazole (PRILOSEC) 20 MG delayed release capsule, TAKE 1 CAPSULE BY MOUTH EVERY DAY, Disp: 90 capsule, Rfl: 1    carvedilol (COREG) 6.25 MG tablet, TAKE 1 TABLET BY MOUTH TWO TIMES A DAY, Disp: 180 tablet, Rfl: 1    traZODone (DESYREL) 50 MG tablet, TAKE 1 TABLET BY MOUTH AT BEDTIME, Disp: 90 tablet, Rfl: 0    atorvastatin (LIPITOR) 80 MG tablet, TAKE 1 TABLET BY MOUTH ONE TIME A DAY, Disp: 90 tablet, Rfl: 3    tamsulosin (FLOMAX) 0.4 MG capsule, Take 1 capsule by mouth daily, Disp: 90 capsule, Rfl: 3    MYRBETRIQ 50 MG TB24, Take 50 mg by mouth daily, Disp: 30 tablet, Rfl: 11    mirabegron (MYRBETRIQ) 50 MG TB24, Take 50 mg by mouth daily, Disp: 28 tablet, Rfl: 0    clopidogrel (PLAVIX) 75 MG tablet, TAKE 1 TABLET BY MOUTH EVERY DAY, Disp: 90 tablet, Rfl: 1    topiramate (TOPAMAX) 50 MG tablet, TAKE 1 TABLET BY MOUTH TWO TIMES A DAY, Disp: 180 tablet, Rfl: 1    citalopram (CELEXA) 40 MG tablet, TAKE 1 TABLET BY MOUTH EVERY DAY, Disp: 90 tablet, Rfl: 3    dicyclomine (BENTYL) 10 MG capsule, Take 1 capsule by mouth 4 times daily as needed (cramping), Disp: 120 capsule, Rfl: 0    fluticasone (FLONASE) 50 MCG/ACT nasal spray, INHALE 2 SPRAYS IN EACH NOSTRIL ONE TIME A DAY, Disp: , Rfl:     ipratropium (ATROVENT) 0.06 % nasal spray, INSTILL 2 SPRAYS INTO EACH NOSTRIL UP TO FOUR TIMES A DAY, Disp: , Rfl:     isosorbide mononitrate (IMDUR) 30 MG extended release tablet, TAKE 1 TABLET BY MOUTH EVERY DAY, Disp: , Rfl: nitroGLYCERIN (NITROSTAT) 0.4 MG SL tablet, Place 1 tablet under the tongue every 5 minutes as needed for Chest pain up to max of 3 total doses.  If no relief after 1 dose, call 911., Disp: 25 tablet, Rfl: 3    tamsulosin (FLOMAX) 0.4 MG capsule, Take 1 capsule by mouth daily Take one capsule daily to facilitate passage of ureteral stone, Disp: 30 capsule, Rfl: 6    vitamin D (ERGOCALCIFEROL) 1.25 MG (51574 UT) CAPS capsule, Take 1 capsule by mouth once a week, Disp: 24 capsule, Rfl: 1    vitamin B-1 (THIAMINE) 100 MG tablet, Take 100 mg by mouth daily, Disp: , Rfl:     OnabotulinumtoxinA (BOTOX IJ), Inject as directed Patient is taking for migraines, Disp: , Rfl:     calcium carbonate (OSCAL) 500 MG TABS tablet, Take 500 mg by mouth daily, Disp: , Rfl:     magnesium 30 MG tablet, Take 30 mg by mouth 2 times daily, Disp: , Rfl:     Galcanezumab-gnlm 120 MG/ML SOAJ, Inject into the skin, Disp: , Rfl:     Acetaminophen (TYLENOL EXTRA STRENGTH PO), Take by mouth Indications: prn, Disp: , Rfl:     POTASSIUM GLUCONATE PO, Take by mouth daily, Disp: , Rfl:     pregabalin (LYRICA) 50 MG capsule, TAKE 1 CAPSULE BY MOUTH THREE TIMES A DAY, Disp: 90 capsule, Rfl: 1    clotrimazole (MYCELEX) 10 MG sujey, Dissolve 1 sujey slowly in mouth five times a day (Patient not taking: Reported on 7/26/2022), Disp: , Rfl:     solifenacin (VESICARE) 5 MG tablet, Take 1 tablet by mouth daily (Patient not taking: Reported on 7/26/2022), Disp: 30 tablet, Rfl: 5    budesonide (PULMICORT) 0.5 MG/2ML nebulizer suspension, USE 2 VIALS AS DIRECTED IN A SINUS RINSE BOTTLE PER DAY (Patient not taking: Reported on 7/26/2022), Disp: , Rfl:     ALLERGIES:   Allergies   Allergen Reactions    Cupric Oxide     Chrome Alum     Cobalt     Copper-Containing Compounds     Nickel        FAMILY HISTORY:       Problem Relation Age of Onset    Heart Disease Father     Hypertension Father     Cancer Sister         lung cancer    Hypertension Brother     Stroke Brother     Tuberculosis Brother          SOCIAL HISTORY:   Social History     Socioeconomic History    Marital status:      Spouse name: Not on file    Number of children: Not on file    Years of education: Not on file    Highest education level: Not on file   Occupational History    Not on file   Tobacco Use    Smoking status: Former     Packs/day: 2.00     Years: 35.00     Pack years: 70.00     Types: Cigarettes     Quit date: 2005     Years since quittin.7    Smokeless tobacco: Former     Types: Chew   Vaping Use    Vaping Use: Never used   Substance and Sexual Activity    Alcohol use: Yes     Alcohol/week: 35.0 - 42.0 standard drinks     Types: 35 - 42 Cans of beer per week     Comment: 4-5 beers a day    Drug use: No    Sexual activity: Not on file   Other Topics Concern    Not on file   Social History Narrative    Not on file     Social Determinants of Health     Financial Resource Strain: Low Risk     Difficulty of Paying Living Expenses: Not hard at all   Food Insecurity: No Food Insecurity    Worried About Running Out of Food in the Last Year: Never true    Ran Out of Food in the Last Year: Never true   Transportation Needs: Not on file   Physical Activity: Not on file   Stress: Not on file   Social Connections: Not on file   Intimate Partner Violence: Not on file   Housing Stability: Not on file         REVIEW OF SYSTEMS:         Review of Systems   Constitutional:  Positive for appetite change (loss). HENT: Negative. Eyes:  Positive for visual disturbance (glasses). Respiratory: Negative. Cardiovascular: Negative. Gastrointestinal:  Positive for abdominal distention, abdominal pain and constipation. Endocrine: Negative. Genitourinary: Negative. Musculoskeletal: Negative. Skin: Negative. Allergic/Immunologic: Negative. Neurological: Negative. Hematological:  Bruises/bleeds easily. Psychiatric/Behavioral: Negative.        PHYSICAL EXAMINATION:     Vital signs reviewed per the nursing documentation. /75   Pulse 70   Wt 241 lb (109.3 kg)   BMI 35.59 kg/m²   Body mass index is 35.59 kg/m². Physical Exam  Vitals reviewed. Constitutional:       Appearance: Normal appearance. Comments: Moderately overweight patient. HENT:      Head: Normocephalic and atraumatic. Eyes:      Extraocular Movements: Extraocular movements intact. Conjunctiva/sclera: Conjunctivae normal.   Cardiovascular:      Rate and Rhythm: Normal rate and regular rhythm. Heart sounds: Normal heart sounds. Pulmonary:      Effort: Pulmonary effort is normal.      Breath sounds: Normal breath sounds. Abdominal:      General: Bowel sounds are normal. There is no distension. Palpations: Abdomen is soft. There is no mass. Tenderness: There is no abdominal tenderness. There is no guarding. Hernia: No hernia is present. Skin:     General: Skin is warm and dry. Neurological:      General: No focal deficit present. Mental Status: He is alert and oriented to person, place, and time. Comments: Patient ambulates with the help of a walker.    Psychiatric:         Mood and Affect: Mood normal.         Behavior: Behavior normal.         LABORATORY DATA: Reviewed  Lab Results   Component Value Date    WBC 6.4 03/08/2021    HGB 14.1 03/08/2021    HCT 42.3 03/08/2021    MCV 95 03/08/2021     03/08/2021     03/03/2022    K 4.2 03/03/2022     03/03/2022    CO2 30 03/03/2022    BUN 11 03/03/2022    CREATININE 0.77 03/03/2022    LABALBU 3.9 03/08/2022    BILITOT 0.4 03/08/2022    ALKPHOS 67 03/08/2022    AST 20 03/08/2022    ALT 22 03/08/2022         Lab Results   Component Value Date    RBC 4.47 03/08/2021    HGB 14.1 03/08/2021    MCV 95 03/08/2021    MCH 31.6 03/08/2021    MCHC 33.4 03/08/2021    RDW 14.1 03/08/2021    MPV 9.4 03/08/2021    BASOPCT 0.5 03/08/2021    LYMPHSABS 1.5 03/08/2021    MONOSABS 0.6 03/08/2021    NEUTROABS 3.8 03/08/2021    EOSABS 0.5 (H) 03/08/2021    BASOSABS 0.0 03/08/2021         DIAGNOSTIC TESTING:     No results found. Assessment  1. Gastric polyp    2. Chronic diarrhea    3. Irritable bowel syndrome with diarrhea        Plan  Patient is advised to cut down Bentyl and may take once a day on a as needed basis. Given that he had dysplastic polyp in the stomach, advised to have EGD. Discussed regarding possibility of IBS and long-term management. Patient is reassured. Recent labs that were done in March 2022 including liver tests, BMP within normal limits. Thank you for allowing me to participate in the care of Mr. Mary Mckeon. For any further questions please do not hesitate to contact me. I have reviewed and agree with the ROS entered by the MA/LPN. Note is dictated utilizing voice recognition software. Unfortunately this leads to occasional typographical errors.  Please contact our office if you have any questions        Yanni Heard MD,FACP, [facility]  Board Certified in Gastroenterology and [address] [place] [address] Gastroenterology  Office #: [phone]

## 2022-07-28 NOTE — TELEPHONE ENCOUNTER
Clearance received from [employer] with approval to hold the plavix and baby aspirin for 5 days. (8/18/22)  Called the patient and spoke to wife Virginia Shown advising of this. Writer thanked and call ended.

## 2022-08-23 DIAGNOSIS — R20.2 PARESTHESIA OF LEFT LEG: ICD-10-CM

## 2022-08-23 DIAGNOSIS — M51.36 DISC DEGENERATION, LUMBAR: ICD-10-CM

## 2022-08-23 DIAGNOSIS — M79.605 LEG PAIN, LATERAL, LEFT: ICD-10-CM

## 2022-08-23 RX ORDER — PREGABALIN 50 MG/1
CAPSULE ORAL
Qty: 90 CAPSULE | Refills: 2 | Status: SHIPPED | OUTPATIENT
Start: 2022-08-23 | End: 2022-10-23

## 2022-09-06 RX ORDER — TOPIRAMATE 50 MG/1
TABLET, FILM COATED ORAL
Qty: 180 TABLET | Refills: 1 | Status: SHIPPED | OUTPATIENT
Start: 2022-09-06

## 2022-09-06 RX ORDER — CLOPIDOGREL BISULFATE 75 MG/1
TABLET ORAL
Qty: 90 TABLET | Refills: 0 | Status: SHIPPED | OUTPATIENT
Start: 2022-09-06 | End: 2022-12-01

## 2022-09-06 RX ORDER — TRAZODONE HYDROCHLORIDE 50 MG/1
TABLET ORAL
Qty: 90 TABLET | Refills: 1 | Status: SHIPPED | OUTPATIENT
Start: 2022-09-06

## 2022-09-08 ENCOUNTER — OFFICE VISIT (OUTPATIENT)
Dept: PRIMARY CARE CLINIC | Age: 72
End: 2022-09-08
Payer: COMMERCIAL

## 2022-09-08 VITALS
WEIGHT: 241.4 LBS | DIASTOLIC BLOOD PRESSURE: 72 MMHG | HEART RATE: 82 BPM | BODY MASS INDEX: 35.65 KG/M2 | OXYGEN SATURATION: 97 % | SYSTOLIC BLOOD PRESSURE: 120 MMHG

## 2022-09-08 DIAGNOSIS — E78.2 MIXED HYPERLIPIDEMIA: ICD-10-CM

## 2022-09-08 DIAGNOSIS — Z23 NEED FOR VACCINATION: ICD-10-CM

## 2022-09-08 DIAGNOSIS — I10 ESSENTIAL HYPERTENSION: Primary | ICD-10-CM

## 2022-09-08 DIAGNOSIS — K58.0 IRRITABLE BOWEL SYNDROME WITH DIARRHEA: ICD-10-CM

## 2022-09-08 DIAGNOSIS — M25.512 CHRONIC LEFT SHOULDER PAIN: ICD-10-CM

## 2022-09-08 DIAGNOSIS — K20.80 CORROSIVE ESOPHAGITIS: ICD-10-CM

## 2022-09-08 DIAGNOSIS — G89.29 CHRONIC LEFT SHOULDER PAIN: ICD-10-CM

## 2022-09-08 DIAGNOSIS — N32.81 OVERACTIVE BLADDER: ICD-10-CM

## 2022-09-08 PROCEDURE — G0008 ADMIN INFLUENZA VIRUS VAC: HCPCS | Performed by: PHYSICIAN ASSISTANT

## 2022-09-08 PROCEDURE — 99214 OFFICE O/P EST MOD 30 MIN: CPT | Performed by: PHYSICIAN ASSISTANT

## 2022-09-08 PROCEDURE — 1123F ACP DISCUSS/DSCN MKR DOCD: CPT | Performed by: PHYSICIAN ASSISTANT

## 2022-09-08 PROCEDURE — 90694 VACC AIIV4 NO PRSRV 0.5ML IM: CPT | Performed by: PHYSICIAN ASSISTANT

## 2022-09-08 RX ORDER — CHLORAL HYDRATE 500 MG
3000 CAPSULE ORAL 3 TIMES DAILY
COMMUNITY

## 2022-09-08 SDOH — ECONOMIC STABILITY: FOOD INSECURITY: WITHIN THE PAST 12 MONTHS, THE FOOD YOU BOUGHT JUST DIDN'T LAST AND YOU DIDN'T HAVE MONEY TO GET MORE.: NEVER TRUE

## 2022-09-08 SDOH — ECONOMIC STABILITY: FOOD INSECURITY: WITHIN THE PAST 12 MONTHS, YOU WORRIED THAT YOUR FOOD WOULD RUN OUT BEFORE YOU GOT MONEY TO BUY MORE.: NEVER TRUE

## 2022-09-08 ASSESSMENT — PATIENT HEALTH QUESTIONNAIRE - PHQ9
SUM OF ALL RESPONSES TO PHQ QUESTIONS 1-9: 1
SUM OF ALL RESPONSES TO PHQ9 QUESTIONS 1 & 2: 1
2. FEELING DOWN, DEPRESSED OR HOPELESS: 1
1. LITTLE INTEREST OR PLEASURE IN DOING THINGS: 0
SUM OF ALL RESPONSES TO PHQ QUESTIONS 1-9: 1

## 2022-09-08 ASSESSMENT — ENCOUNTER SYMPTOMS
SHORTNESS OF BREATH: 0
EYE DISCHARGE: 0
SORE THROAT: 0
WHEEZING: 0
DIARRHEA: 0
ABDOMINAL DISTENTION: 1
NAUSEA: 0
ABDOMINAL PAIN: 0
COUGH: 0
RHINORRHEA: 0
EYE REDNESS: 0
VOMITING: 0

## 2022-09-08 ASSESSMENT — SOCIAL DETERMINANTS OF HEALTH (SDOH): HOW HARD IS IT FOR YOU TO PAY FOR THE VERY BASICS LIKE FOOD, HOUSING, MEDICAL CARE, AND HEATING?: NOT HARD AT ALL

## 2022-09-08 NOTE — PROGRESS NOTES
[address] [facility] PRIMARY CARE  [address] [facility] [address]  Dept: [address] Ces[address]es is a 66 y.o. male who presents today for his medical conditions/complaints as noted below. Chief Complaint   Patient presents with    Hypertension     Patient doesn't check B/P at home       HPI:     Hypertension  Associated symptoms include headaches (chronic). Pertinent negatives include no chest pain, palpitations or shortness of breath. Patient getting management for Pain Management for chronic back pain, getting ready to get injections. BP stable, not taking at home. No vision disturbances, chest tightness, SOB, lightheadedness, dizziness. Urology managing overactive bladder.      LDL Calculated (mg/dL)   Date Value   09/02/2021 75   10/23/2019 83   09/19/2017 84       (goal LDL is <100)   AST (U/L)   Date Value   03/08/2022 20     ALT (U/L)   Date Value   03/08/2022 22     BUN (mg/dL)   Date Value   03/03/2022 11     BP Readings from Last 3 Encounters:   09/08/22 120/72   07/26/22 134/75   05/03/22 124/74          (goal 120/80)    Past Medical History:   Diagnosis Date    Anxiety     Duodenitis     Fundic gland polyps of stomach, benign     Hyperlipidemia     Hypertension       Past Surgical History:   Procedure Laterality Date    APPENDECTOMY      BACK SURGERY      CHOLECYSTECTOMY      COLONOSCOPY  3/7/2012    tubular adenoma, hyperplastic polyp    COLONOSCOPY  05/28/2019    JOINT REPLACEMENT      hip    OTHER SURGICAL HISTORY  08/29/2016    Spinal stimulator    SPINAL CORD STIMULATOR SURGERY  08/29/2016    UPPER GASTROINTESTINAL ENDOSCOPY  05/28/2019    UPPER GASTROINTESTINAL ENDOSCOPY  10/15/2020       Family History   Problem Relation Age of Onset    Heart Disease Father     Hypertension Father     Cancer Sister         lung cancer    Hypertension Brother     Stroke Brother     Tuberculosis Brother        Social History     Tobacco Use    Smoking status: Former Packs/day: 2.00     Years: 35.00     Pack years: 70.00     Types: Cigarettes     Quit date: 2005     Years since quittin.8    Smokeless tobacco: Former     Types: Chew   Substance Use Topics    Alcohol use: Yes     Alcohol/week: 35.0 - 42.0 standard drinks     Types: 35 - 42 Cans of beer per week     Comment: 4-5 beers a day      Current Outpatient Medications   Medication Sig Dispense Refill    Omega-3 Fatty Acids (FISH OIL) 1000 MG CAPS Take 3,000 mg by mouth 3 times daily      traZODone (DESYREL) 50 MG tablet TAKE 1 TABLET BY MOUTH AT BEDTIME 90 tablet 1    clopidogrel (PLAVIX) 75 MG tablet TAKE 1 TABLET BY MOUTH EVERY DAY 90 tablet 0    topiramate (TOPAMAX) 50 MG tablet TAKE 1 TABLET BY MOUTH TWO TIMES A  tablet 1    pregabalin (LYRICA) 50 MG capsule TAKE 1 CAPSULE BY MOUTH THREE TIMES A DAY 90 capsule 2    aspirin 81 MG EC tablet Take 81 mg by mouth in the morning.       pilocarpine (SALAGEN) 5 MG tablet Take 1 tablet by mouth 3 times daily 90 tablet 2    lisinopril (PRINIVIL;ZESTRIL) 10 MG tablet TAKE 1 TABLET BY MOUTH EVERY DAY 90 tablet 1    allopurinol (ZYLOPRIM) 300 MG tablet TAKE 1 TABLET BY MOUTH EVERY DAY 90 tablet 1    omeprazole (PRILOSEC) 20 MG delayed release capsule TAKE 1 CAPSULE BY MOUTH EVERY DAY 90 capsule 1    carvedilol (COREG) 6.25 MG tablet TAKE 1 TABLET BY MOUTH TWO TIMES A  tablet 1    atorvastatin (LIPITOR) 80 MG tablet TAKE 1 TABLET BY MOUTH ONE TIME A DAY 90 tablet 3    MYRBETRIQ 50 MG TB24 Take 50 mg by mouth daily 30 tablet 11    mirabegron (MYRBETRIQ) 50 MG TB24 Take 50 mg by mouth daily 28 tablet 0    citalopram (CELEXA) 40 MG tablet TAKE 1 TABLET BY MOUTH EVERY DAY 90 tablet 3    dicyclomine (BENTYL) 10 MG capsule Take 1 capsule by mouth 4 times daily as needed (cramping) 120 capsule 0    ipratropium (ATROVENT) 0.06 % nasal spray INSTILL 2 SPRAYS INTO EACH NOSTRIL UP TO FOUR TIMES A DAY      isosorbide mononitrate (IMDUR) 30 MG extended release tablet TAKE 1 TABLET BY MOUTH EVERY DAY      nitroGLYCERIN (NITROSTAT) 0.4 MG SL tablet Place 1 tablet under the tongue every 5 minutes as needed for Chest pain up to max of 3 total doses. If no relief after 1 dose, call 911. 25 tablet 3    tamsulosin (FLOMAX) 0.4 MG capsule Take 1 capsule by mouth daily Take one capsule daily to facilitate passage of ureteral stone 30 capsule 6    vitamin B-1 (THIAMINE) 100 MG tablet Take 100 mg by mouth daily      OnabotulinumtoxinA (BOTOX IJ) Inject as directed Patient is taking for migraines      calcium carbonate (OSCAL) 500 MG TABS tablet Take 500 mg by mouth daily      magnesium 30 MG tablet Take 30 mg by mouth 2 times daily      Galcanezumab-gnlm 120 MG/ML SOAJ Inject into the skin      Acetaminophen (TYLENOL EXTRA STRENGTH PO) Take by mouth Indications: prn      POTASSIUM GLUCONATE PO Take by mouth daily       No current facility-administered medications for this visit. Allergies   Allergen Reactions    Cupric Oxide     Chrome Alum     Cobalt     Copper-Containing Compounds     Nickel        Health Maintenance   Topic Date Due    Annual Wellness Visit (AWV)  08/04/2022    Flu vaccine (1) 09/01/2022    Lipids  06/10/2023    Depression Screen  09/08/2023    Colorectal Cancer Screen  05/28/2029    DTaP/Tdap/Td vaccine (2 - Td or Tdap) 10/01/2030    Shingles vaccine  Completed    Pneumococcal 65+ years Vaccine  Completed    COVID-19 Vaccine  Completed    AAA screen  Completed    Hepatitis C screen  Completed    Hepatitis A vaccine  Aged Out    Hepatitis B vaccine  Aged Out    Hib vaccine  Aged Out    Meningococcal (ACWY) vaccine  Aged Out       Subjective:      Review of Systems   Constitutional:  Negative for chills and fever. HENT:  Negative for rhinorrhea and sore throat. Eyes:  Negative for discharge and redness. Respiratory:  Negative for cough, shortness of breath and wheezing. Cardiovascular:  Negative for chest pain and palpitations.    Gastrointestinal:  Positive for abdominal distention (chronic, sees GI). Negative for abdominal pain, diarrhea, nausea and vomiting. Genitourinary:  Positive for urgency (improving). Negative for dysuria and frequency. Musculoskeletal:  Negative for arthralgias and myalgias. Neurological:  Positive for headaches (chronic). Negative for dizziness and light-headedness. Psychiatric/Behavioral:  Negative for sleep disturbance. Objective:     /72   Pulse 82   Wt 241 lb 6.4 oz (109.5 kg)   SpO2 97%   BMI 35.65 kg/m²   Physical Exam  Vitals and nursing note reviewed. Constitutional:       General: He is not in acute distress. Appearance: He is well-developed. He is obese. He is not ill-appearing. HENT:      Head: Normocephalic and atraumatic. Right Ear: External ear normal.      Left Ear: External ear normal.   Eyes:      General: No scleral icterus. Right eye: No discharge. Left eye: No discharge. Conjunctiva/sclera: Conjunctivae normal.   Neck:      Thyroid: No thyromegaly. Trachea: No tracheal deviation. Cardiovascular:      Rate and Rhythm: Normal rate and regular rhythm. Heart sounds: Normal heart sounds. Pulmonary:      Effort: Pulmonary effort is normal. No respiratory distress. Breath sounds: Normal breath sounds. No wheezing. Lymphadenopathy:      Cervical: No cervical adenopathy. Skin:     General: Skin is warm. Findings: No rash. Neurological:      Mental Status: He is alert and oriented to person, place, and time. Psychiatric:         Mood and Affect: Mood normal.         Behavior: Behavior normal.         Thought Content: Thought content normal.       Assessment:       Diagnosis Orders   1. Essential hypertension  Comprehensive Metabolic Panel, Fasting      2. Mixed hyperlipidemia  Lipid Panel      3. Chronic left shoulder pain        4. Overactive bladder  CBC with Auto Differential      5.  Need for vaccination  Influenza, FLUAD, (age 72 y+), IM,

## 2022-09-13 ENCOUNTER — TELEPHONE (OUTPATIENT)
Dept: UROLOGY | Age: 72
End: 2022-09-13

## 2022-09-13 NOTE — TELEPHONE ENCOUNTER
Patients wife left a message on clinic line, stating that \"Myrbetriq copay was $100 for the medication. Is there anything cheaper? \"  [address] called wife and stated that there was a savings card and she could come pick it up when needed. Wife verbalized understanding and call was ended.

## 2022-09-16 DIAGNOSIS — E55.9 VITAMIN D DEFICIENCY: Primary | ICD-10-CM

## 2022-09-17 LAB
ABSOLUTE BASO #: 0.04 K/UL (ref 0–0.2)
ABSOLUTE EOS #: 0.31 K/UL (ref 0–0.5)
ABSOLUTE LYMPH #: 1.19 K/UL (ref 1–4)
ABSOLUTE MONO #: 0.57 K/UL (ref 0.2–1)
ABSOLUTE NEUT #: 4.45 K/UL (ref 1.5–7.5)
ALBUMIN SERPL-MCNC: 4.4 G/DL (ref 3.5–5.2)
ALK PHOSPHATASE: 72 U/L (ref 40–125)
ALT SERPL-CCNC: 23 U/L (ref 5–50)
ANION GAP SERPL CALCULATED.3IONS-SCNC: 9 MEQ/L (ref 7–16)
AST SERPL-CCNC: 19 U/L (ref 9–50)
BASOPHILS RELATIVE PERCENT: 0.6 %
BILIRUB SERPL-MCNC: 0.5 MG/DL
BUN BLDV-MCNC: 12 MG/DL (ref 8–23)
CALCIUM SERPL-MCNC: 9.1 MG/DL (ref 8.5–10.5)
CHLORIDE BLD-SCNC: 104 MEQ/L (ref 95–107)
CHOLESTEROL/HDL RATIO: 2.4 RATIO
CHOLESTEROL: 164 MG/DL
CO2: 29 MEQ/L (ref 19–31)
CREAT SERPL-MCNC: 0.66 MG/DL (ref 0.8–1.4)
EGFR IF NONAFRICAN AMERICAN: 99 ML/MIN/1.73
EOSINOPHILS RELATIVE PERCENT: 4.7 %
GLUCOSE: 105 MG/DL (ref 70–99)
HCT VFR BLD CALC: 43.5 % (ref 40–51)
HDLC SERPL-MCNC: 67 MG/DL
HEMOGLOBIN: 14.6 G/DL (ref 13.5–17)
LDL CHOLESTEROL CALCULATED: 72 MG/DL
LDL/HDL RATIO: 1.1 RATIO
LYMPHOCYTE %: 18.1 %
MCH RBC QN AUTO: 30.9 PG (ref 25–33)
MCHC RBC AUTO-ENTMCNC: 33.6 G/DL (ref 31–36)
MCV RBC AUTO: 92.2 FL (ref 80–99)
MONOCYTES # BLD: 8.7 %
NEUTROPHILS RELATIVE PERCENT: 67.6 %
PDW BLD-RTO: 13.5 % (ref 11.5–15)
PLATELETS: 186 K/UL (ref 130–400)
PMV BLD AUTO: 10.8 FL (ref 9.3–13)
POTASSIUM SERPL-SCNC: 4.5 MEQ/L (ref 3.5–5.4)
RBC: 4.72 M/UL (ref 4.5–6.1)
SODIUM BLD-SCNC: 142 MEQ/L (ref 133–146)
TOTAL PROTEIN: 6.6 G/DL (ref 6.1–8.3)
TRIGL SERPL-MCNC: 124 MG/DL
VITAMIN D 25-HYDROXY: 61 NG/ML
VLDLC SERPL CALC-MCNC: 25 MG/DL
WBC: 6.6 K/UL (ref 3.5–11)

## 2022-09-20 ENCOUNTER — TELEPHONE (OUTPATIENT)
Dept: PRIMARY CARE CLINIC | Age: 72
End: 2022-09-20

## 2022-09-20 NOTE — TELEPHONE ENCOUNTER
Spouse called in states pt is scheduled for shoulder repair surgery 10/6/22 with Dr. Jihan Braswell at [address] [place] [address]. States he needs clearance and also needs to know how many days prior he needs to hold plavix. Please advise. Does pt need an appt for clearance?

## 2022-09-29 PROBLEM — K31.7 GASTRIC POLYPS: Status: ACTIVE | Noted: 2022-09-29

## 2022-09-29 NOTE — TELEPHONE ENCOUNTER
Note in Media does not clear him for surgery----It says will be cleared after repeat EKG. Need cardiac note that says he is cleared.

## 2022-09-30 ENCOUNTER — OFFICE VISIT (OUTPATIENT)
Dept: GASTROENTEROLOGY | Age: 72
End: 2022-09-30
Payer: COMMERCIAL

## 2022-09-30 VITALS
BODY MASS INDEX: 35.88 KG/M2 | WEIGHT: 243 LBS | DIASTOLIC BLOOD PRESSURE: 77 MMHG | HEART RATE: 72 BPM | SYSTOLIC BLOOD PRESSURE: 118 MMHG

## 2022-09-30 DIAGNOSIS — K31.7 GASTRIC POLYP: Primary | ICD-10-CM

## 2022-09-30 PROCEDURE — APPSS45 APP SPLIT SHARED TIME 31-45 MINUTES: Performed by: NURSE PRACTITIONER

## 2022-09-30 PROCEDURE — 1123F ACP DISCUSS/DSCN MKR DOCD: CPT | Performed by: INTERNAL MEDICINE

## 2022-09-30 PROCEDURE — 99214 OFFICE O/P EST MOD 30 MIN: CPT | Performed by: INTERNAL MEDICINE

## 2022-09-30 ASSESSMENT — ENCOUNTER SYMPTOMS
ABDOMINAL DISTENTION: 1
RESPIRATORY NEGATIVE: 1
CONSTIPATION: 1
ABDOMINAL PAIN: 1
ALLERGIC/IMMUNOLOGIC NEGATIVE: 1

## 2022-09-30 NOTE — PROGRESS NOTES
GI OFFICE FOLLOW UP    INTERVAL HISTORY:   No referring provider defined for this encounter. Chief Complaint   Patient presents with    Other     Pt here for egd f/u     Bloated     Pt states still having bloating and gas        HISTORY OF PRESENT ILLNESS:     Patient being seen for follow-up EGD. Patient had EGD to evaluate dysplastic gastric polyps. EGD again demonstrated prominent gastric folds. Extensive biopsies in the past did not reveal significant pathology. Patient has small gastric polyps in the upper part of the stomach towards the fundus. Most of these are 3 to 4 mm. Multiple biopsies taken in similar biopsies were excised. 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 Findings were discussed with pathologist.    At present patient is doing well. There is no weight loss  No dysphagia, odynophagia, dyspeptic symptoms. Patient continues to have mild R abdominal wall pain but reports it is the best it's ever been. Worse with movement, lifting. Bowel movements at present are satisfactory. Has hx of significant diarrhea in the past.  Denies any melena, hematochezia. Past Medical,Family, and Social History reviewed and does contribute to the patient presenting condition. Patient's PMH/PSH,SH,PSYCH Hx, MEDs, ALLERGIES, and ROS were all reviewed and updated in the appropriate sections.  Yes      PAST MEDICAL HISTORY:  Past Medical History:   Diagnosis Date    Anxiety     Duodenitis     Fundic gland polyps of stomach, benign     Hyperlipidemia     Hypertension        Past Surgical History:   Procedure Laterality Date    APPENDECTOMY      BACK SURGERY      CHOLECYSTECTOMY      COLONOSCOPY  3/7/2012    tubular adenoma, hyperplastic polyp    COLONOSCOPY  05/28/2019    JOINT REPLACEMENT      hip    OTHER SURGICAL HISTORY  08/29/2016    Spinal stimulator SPINAL CORD STIMULATOR SURGERY  08/29/2016    UPPER GASTROINTESTINAL ENDOSCOPY  05/28/2019    UPPER GASTROINTESTINAL ENDOSCOPY  10/15/2020       CURRENT MEDICATIONS:    Current Outpatient Medications:     Omega-3 Fatty Acids (FISH OIL) 1000 MG CAPS, Take 3,000 mg by mouth 3 times daily, Disp: , Rfl:     traZODone (DESYREL) 50 MG tablet, TAKE 1 TABLET BY MOUTH AT BEDTIME, Disp: 90 tablet, Rfl: 1    clopidogrel (PLAVIX) 75 MG tablet, TAKE 1 TABLET BY MOUTH EVERY DAY, Disp: 90 tablet, Rfl: 0    topiramate (TOPAMAX) 50 MG tablet, TAKE 1 TABLET BY MOUTH TWO TIMES A DAY, Disp: 180 tablet, Rfl: 1    pregabalin (LYRICA) 50 MG capsule, TAKE 1 CAPSULE BY MOUTH THREE TIMES A DAY, Disp: 90 capsule, Rfl: 2    aspirin 81 MG EC tablet, Take 81 mg by mouth in the morning., Disp: , Rfl:     pilocarpine (SALAGEN) 5 MG tablet, Take 1 tablet by mouth 3 times daily, Disp: 90 tablet, Rfl: 2    lisinopril (PRINIVIL;ZESTRIL) 10 MG tablet, TAKE 1 TABLET BY MOUTH EVERY DAY, Disp: 90 tablet, Rfl: 1    allopurinol (ZYLOPRIM) 300 MG tablet, TAKE 1 TABLET BY MOUTH EVERY DAY, Disp: 90 tablet, Rfl: 1    omeprazole (PRILOSEC) 20 MG delayed release capsule, TAKE 1 CAPSULE BY MOUTH EVERY DAY, Disp: 90 capsule, Rfl: 1    carvedilol (COREG) 6.25 MG tablet, TAKE 1 TABLET BY MOUTH TWO TIMES A DAY, Disp: 180 tablet, Rfl: 1    atorvastatin (LIPITOR) 80 MG tablet, TAKE 1 TABLET BY MOUTH ONE TIME A DAY, Disp: 90 tablet, Rfl: 3    MYRBETRIQ 50 MG TB24, Take 50 mg by mouth daily, Disp: 30 tablet, Rfl: 11    mirabegron (MYRBETRIQ) 50 MG TB24, Take 50 mg by mouth daily, Disp: 28 tablet, Rfl: 0    citalopram (CELEXA) 40 MG tablet, TAKE 1 TABLET BY MOUTH EVERY DAY, Disp: 90 tablet, Rfl: 3    dicyclomine (BENTYL) 10 MG capsule, Take 1 capsule by mouth 4 times daily as needed (cramping), Disp: 120 capsule, Rfl: 0    ipratropium (ATROVENT) 0.06 % nasal spray, INSTILL 2 SPRAYS INTO EACH NOSTRIL UP TO FOUR TIMES A DAY, Disp: , Rfl:     isosorbide mononitrate (IMDUR) 30 MG extended release tablet, TAKE 1 TABLET BY MOUTH EVERY DAY, Disp: , Rfl:     nitroGLYCERIN (NITROSTAT) 0.4 MG SL tablet, Place 1 tablet under the tongue every 5 minutes as needed for Chest pain up to max of 3 total doses.  If no relief after 1 dose, call 911., Disp: 25 tablet, Rfl: 3    tamsulosin (FLOMAX) 0.4 MG capsule, Take 1 capsule by mouth daily Take one capsule daily to facilitate passage of ureteral stone, Disp: 30 capsule, Rfl: 6    vitamin B-1 (THIAMINE) 100 MG tablet, Take 100 mg by mouth daily, Disp: , Rfl:     OnabotulinumtoxinA (BOTOX IJ), Inject as directed Patient is taking for migraines, Disp: , Rfl:     calcium carbonate (OSCAL) 500 MG TABS tablet, Take 500 mg by mouth daily, Disp: , Rfl:     magnesium 30 MG tablet, Take 30 mg by mouth 2 times daily, Disp: , Rfl:     Galcanezumab-gnlm 120 MG/ML SOAJ, Inject into the skin, Disp: , Rfl:     Acetaminophen (TYLENOL EXTRA STRENGTH PO), Take by mouth Indications: prn, Disp: , Rfl:     POTASSIUM GLUCONATE PO, Take by mouth daily, Disp: , Rfl:     ALLERGIES:   Allergies   Allergen Reactions    Cupric Oxide     Chrome Alum     Cobalt     Copper-Containing Compounds     Nickel        FAMILY HISTORY:       Problem Relation Age of Onset    Heart Disease Father     Hypertension Father     Cancer Sister         lung cancer    Hypertension Brother     Stroke Brother     Tuberculosis Brother          SOCIAL HISTORY:   Social History     Socioeconomic History    Marital status:      Spouse name: Not on file    Number of children: Not on file    Years of education: Not on file    Highest education level: Not on file   Occupational History    Not on file   Tobacco Use    Smoking status: Former     Packs/day: 2.00     Years: 35.00     Pack years: 70.00     Types: Cigarettes     Quit date: 2005     Years since quittin.8    Smokeless tobacco: Former     Types: Chew   Vaping Use    Vaping Use: Never used   Substance and Sexual Activity    Alcohol use: Yes     Alcohol/week: 35.0 - 42.0 standard drinks     Types: 35 - 42 Cans of beer per week     Comment: 4-5 beers a day    Drug use: No    Sexual activity: Not on file   Other Topics Concern    Not on file   Social History Narrative    Not on file     Social Determinants of Health     Financial Resource Strain: Low Risk     Difficulty of Paying Living Expenses: Not hard at all   Food Insecurity: No Food Insecurity    Worried About Running Out of Food in the Last Year: Never true    Ran Out of Food in the Last Year: Never true   Transportation Needs: Not on file   Physical Activity: Not on file   Stress: Not on file   Social Connections: Not on file   Intimate Partner Violence: Not on file   Housing Stability: Not on file         REVIEW OF SYSTEMS:         Review of Systems   Constitutional:  Positive for appetite change (loss). HENT: Negative. Eyes:  Positive for visual disturbance (glasses). Respiratory: Negative. Cardiovascular: Negative. Gastrointestinal:  Positive for abdominal distention, abdominal pain and constipation. Endocrine: Negative. Genitourinary: Negative. Musculoskeletal: Negative. Skin: Negative. Allergic/Immunologic: Negative. Neurological: Negative. Hematological:  Bruises/bleeds easily. Psychiatric/Behavioral: Negative. PHYSICAL EXAMINATION:     Vital signs reviewed per the nursing documentation. /77   Pulse 72   Wt 243 lb (110.2 kg)   BMI 35.88 kg/m²   Body mass index is 35.88 kg/m². Physical Exam  Constitutional:       Appearance: Normal appearance. He is obese. Eyes:      General: No scleral icterus. Pupils: Pupils are equal, round, and reactive to light. Cardiovascular:      Rate and Rhythm: Normal rate and regular rhythm. Heart sounds: Normal heart sounds. Pulmonary:      Effort: Pulmonary effort is normal.      Breath sounds: Normal breath sounds.    Abdominal:      General: Bowel sounds are normal. There is no distension. Palpations: Abdomen is soft. There is no mass. Tenderness: There is no abdominal tenderness. There is no guarding. Skin:     General: Skin is warm and dry. Coloration: Skin is not jaundiced. Neurological:      Mental Status: He is alert and oriented to person, place, and time. Mental status is at baseline. LABORATORY DATA: Reviewed  Lab Results   Component Value Date    WBC 6.6 09/16/2022    HGB 14.6 09/16/2022    HCT 43.5 09/16/2022    MCV 92.2 09/16/2022     09/16/2022     09/16/2022    K 4.5 09/16/2022     09/16/2022    CO2 29 09/16/2022    BUN 12 09/16/2022    CREATININE 0.66 (L) 09/16/2022    LABALBU 4.4 09/16/2022    BILITOT 0.5 09/16/2022    ALKPHOS 72 09/16/2022    AST 19 09/16/2022    ALT 23 09/16/2022         Lab Results   Component Value Date    RBC 4.72 09/16/2022    HGB 14.6 09/16/2022    MCV 92.2 09/16/2022    MCH 30.9 09/16/2022    MCHC 33.6 09/16/2022    RDW 13.5 09/16/2022    MPV 10.8 09/16/2022    BASOPCT 0.6 09/16/2022    LYMPHSABS 1.19 09/16/2022    MONOSABS 0.57 09/16/2022    NEUTROABS 4.45 09/16/2022    EOSABS 0.31 09/16/2022    BASOSABS 0.04 09/16/2022         DIAGNOSTIC TESTING:     No results found. Assessment  1. Gastric polyp        Plan  EGD reviewed with patient and wife in detail. 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 Discussed with pathology. Recommend repeat EGD in 3 months with polypectomy. Will arrange EGD and plan to excise as many polyps as possible. Lindsey had colonoscopy in 2019   He had 1 hyperplastic polyp at that time. Also, significant diverticulosis. Encouraged patient to eat high fiber diet. He is overweight. The patient is asked to make an attempt to improve diet and exercise patterns. Thank you for allowing me to participate in the care of Mr. Larry Medellin. For any further questions please do not hesitate to contact me.     I have reviewed and agree with the ROS entered by the MA/LPN. Note is dictated utilizing voice recognition software. Unfortunately this leads to occasional typographical errors. Please contact our office if you have any questions  GI attending physician note. Patient seen with APRN     I independently performed an evaluation on Marcelina Dumont. I have reviewed the above documentation completed by the Nurse Practitioner and agree with the history, examination, and management plan. Recommendations discussed. Discussed with the patient and wife regarding pathology results. At present his symptoms are better. He is gaining weight. Also I did discuss with the pathologist at [facility] lab regarding histology. Recommendations:    Advised repeat EGD in the next 3 months and remove rest of the polyps for histology. He had a colonoscopy done in 2019 and at that time no polyps identified. I doubt that he has a familial polyposis. He may need serum gastrin levels and this is advised. Strongly encouraged calorie restriction, exercise activity and to lose weight.                   Henry Amin MD,FACP, [facility]  Board Certified in Gastroenterology and [address] [place] [address] Gastroenterology  Office #: [phone]

## 2022-10-04 ENCOUNTER — OFFICE VISIT (OUTPATIENT)
Dept: PODIATRY | Age: 72
End: 2022-10-04
Payer: COMMERCIAL

## 2022-10-04 VITALS — BODY MASS INDEX: 35.99 KG/M2 | HEIGHT: 69 IN | WEIGHT: 243 LBS

## 2022-10-04 DIAGNOSIS — M79.604 BILATERAL LOWER EXTREMITY PAIN: ICD-10-CM

## 2022-10-04 DIAGNOSIS — B35.1 DERMATOPHYTOSIS OF NAIL: Primary | ICD-10-CM

## 2022-10-04 DIAGNOSIS — M79.605 BILATERAL LOWER EXTREMITY PAIN: ICD-10-CM

## 2022-10-04 DIAGNOSIS — I73.9 PVD (PERIPHERAL VASCULAR DISEASE) (HCC): ICD-10-CM

## 2022-10-04 PROCEDURE — 99999 PR OFFICE/OUTPT VISIT,PROCEDURE ONLY: CPT | Performed by: PODIATRIST

## 2022-10-04 PROCEDURE — 11721 DEBRIDE NAIL 6 OR MORE: CPT | Performed by: PODIATRIST

## 2022-10-04 RX ORDER — CHLORAL HYDRATE 500 MG
CAPSULE ORAL
COMMUNITY
Start: 2022-06-15

## 2022-10-04 RX ORDER — LORAZEPAM 0.5 MG/1
TABLET ORAL
COMMUNITY
Start: 2022-08-28

## 2022-10-04 RX ORDER — DIAZEPAM 10 MG/1
TABLET ORAL
COMMUNITY
Start: 2022-08-12

## 2022-10-04 NOTE — PROGRESS NOTES
[address].  Dept: [phone]     PAIN PROGRESS NOTE  Date of patient's visit: 10/4/2022  Patient's Name:  Li Dubois YOB: 1950            Patient Care Team:  Dee Dee Irizarry PA-C as PCP - General (Physician Assistant)  Dee Dee Irizarry PA-C as PCP - [facility] Empaneled Provider  Tana Kumari MD as Consulting Physician (Gastroenterology)  Danny Humphries MD as Consulting Physician (Family Medicine)  Echo Hubbard MD as Anesthesiologist (Pain Management)  Hilda Irvin MD as Consulting Physician (Urology)  Arlyn Richard MD as Consulting Physician (Cardiology)      Chief Complaint   Patient presents with    Nail Problem     Toenail trim    Foot Pain     Bl feet       Subjective: This Li Dubois comes to clinic for foot and nail care. Pt currently has complaint of thickened, painful, elongated nails that he/she cannot manage by themselves. Pt. Relates pain to nails with shoe gear. Pt's primary care physician is Dee Dee Irizarry PA-C last seen September 8 2022. Past Medical History:   Diagnosis Date    Anxiety     Duodenitis     Fundic gland polyps of stomach, benign     Hyperlipidemia     Hypertension        Allergies   Allergen Reactions    Cupric Oxide     Chrome Alum     Cobalt     Copper-Containing Compounds     Nickel      Current Outpatient Medications on File Prior to Visit   Medication Sig Dispense Refill    Omega-3 1000 MG CAPS Take 4 grams daily by mouth daily.       diazePAM (VALIUM) 10 MG tablet TAKE 1 TABLET BY MOUTH FOR 1 DOSE   TAKE HALF HOUR BEFORE MRI      LORazepam (ATIVAN) 0.5 MG tablet TAKE 1 TABLET BY MOUTH EVERY SIX HOURS AS NEEDED FOR ANXIETY      Omega-3 Fatty Acids (FISH OIL) 1000 MG CAPS Take 3,000 mg by mouth 3 times daily      traZODone (DESYREL) 50 MG tablet TAKE 1 TABLET BY MOUTH AT BEDTIME 90 tablet 1 clopidogrel (PLAVIX) 75 MG tablet TAKE 1 TABLET BY MOUTH EVERY DAY 90 tablet 0    topiramate (TOPAMAX) 50 MG tablet TAKE 1 TABLET BY MOUTH TWO TIMES A  tablet 1    pregabalin (LYRICA) 50 MG capsule TAKE 1 CAPSULE BY MOUTH THREE TIMES A DAY 90 capsule 2    aspirin 81 MG EC tablet Take 81 mg by mouth in the morning. pilocarpine (SALAGEN) 5 MG tablet Take 1 tablet by mouth 3 times daily 90 tablet 2    lisinopril (PRINIVIL;ZESTRIL) 10 MG tablet TAKE 1 TABLET BY MOUTH EVERY DAY 90 tablet 1    allopurinol (ZYLOPRIM) 300 MG tablet TAKE 1 TABLET BY MOUTH EVERY DAY 90 tablet 1    omeprazole (PRILOSEC) 20 MG delayed release capsule TAKE 1 CAPSULE BY MOUTH EVERY DAY 90 capsule 1    carvedilol (COREG) 6.25 MG tablet TAKE 1 TABLET BY MOUTH TWO TIMES A  tablet 1    atorvastatin (LIPITOR) 80 MG tablet TAKE 1 TABLET BY MOUTH ONE TIME A DAY 90 tablet 3    MYRBETRIQ 50 MG TB24 Take 50 mg by mouth daily 30 tablet 11    mirabegron (MYRBETRIQ) 50 MG TB24 Take 50 mg by mouth daily 28 tablet 0    citalopram (CELEXA) 40 MG tablet TAKE 1 TABLET BY MOUTH EVERY DAY 90 tablet 3    dicyclomine (BENTYL) 10 MG capsule Take 1 capsule by mouth 4 times daily as needed (cramping) 120 capsule 0    ipratropium (ATROVENT) 0.06 % nasal spray INSTILL 2 SPRAYS INTO EACH NOSTRIL UP TO FOUR TIMES A DAY      isosorbide mononitrate (IMDUR) 30 MG extended release tablet TAKE 1 TABLET BY MOUTH EVERY DAY      nitroGLYCERIN (NITROSTAT) 0.4 MG SL tablet Place 1 tablet under the tongue every 5 minutes as needed for Chest pain up to max of 3 total doses.  If no relief after 1 dose, call 911. 25 tablet 3    tamsulosin (FLOMAX) 0.4 MG capsule Take 1 capsule by mouth daily Take one capsule daily to facilitate passage of ureteral stone 30 capsule 6    vitamin B-1 (THIAMINE) 100 MG tablet Take 100 mg by mouth daily      OnabotulinumtoxinA (BOTOX IJ) Inject as directed Patient is taking for migraines      calcium carbonate (OSCAL) 500 MG TABS tablet Take 500 mg by mouth daily      magnesium 30 MG tablet Take 30 mg by mouth 2 times daily      Galcanezumab-gnlm 120 MG/ML SOAJ Inject into the skin      Acetaminophen (TYLENOL EXTRA STRENGTH PO) Take by mouth Indications: prn      POTASSIUM GLUCONATE PO Take by mouth daily       No current facility-administered medications on file prior to visit. Review of Systems. Review of Systems:   History obtained from chart review and the patient  General ROS: negative for - chills, fatigue, fever, night sweats or weight gain  Constitutional: Negative for chills, diaphoresis, fatigue, fever and unexpected weight change. Musculoskeletal: Positive for arthralgias, gait problem and joint swelling. Neurological ROS: negative for - behavioral changes, confusion, headaches or seizures. Negative for weakness and numbness. Dermatological ROS: negative for - mole changes, rash  Cardiovascular: Negative for leg swelling. Gastrointestinal: Negative for constipation, diarrhea, nausea and vomiting. Objective:  Dermatologic Exam:  Skin lesion/ulceration Absent . Skin No rashes or nodules noted. .   Skin is thin, with flaky sloughing skin as well as decreased hair growth to the lower leg  Small red hemosiderin deposits seen dorsal foot   Musculoskeletal:     1st MPJ ROM decreased, Bilateral.  Muscle strength 5/5, Bilateral.  Pain present upon palpation of toenails 1-5, Bilateral. decreased medial longitudinal arch, Bilateral.  Ankle ROM decreased,Bilateral.    Dorsally contracted digits present digits 2, Bilateral.     Vascular: DP pulses 1/4 bilateral.  PT pulses 0/4 bilateral.   CFT <5 seconds, Bilateral.  Hair growth absent to the level of the digits, Bilateral.  Edema present, Bilateral.  Varicosities absent, Bilateral. Erythema absent, Bilateral    Neurological: Sensation diminshed to light touch to level of digits, Bilateral.  Protective sensation intact 6/10 sites via 5.07/10g P[place]ear-Ashlee Monofilament, Bilateral.  negative Tinel's, Bilateral.  negative Valleix sign, Bilateral.      Integument: Warm, dry, supple, Bilateral.  Open lesion absent, Bilateral.  Interdigital maceration absent to web spaces 4, Bilateral.  Nails 1-5 left and 1-5 right thickened > 3.0 mm, dystrophic and crumbly, discolored with subungual debris. Fissures absent, Bilateral.   General: AAO x 3 in NAD. Derm  Toenail Description  Sites of Onychomycosis Involvement (Check affected area)  [x] [x] [x] [x] [x] [x] [x] [x] [x] [x]  5 4 3 2 1 1 2 3 4 5                          Right                                        Left    Thickness  [x] [x] [x] [x] [x] [x] [x] [x] [x] [x]  5 4 3 2 1 1 2 3 4 5                         Right                                        Left    Dystrophic Changes   [x] [x] [x] [x] [x] [x] [x] [x] [x] [x]  5 4 3 2 1 1 2 3 4 5                         Right                                        Left    Color  [x] [x] [x] [x] [x] [x] [x] [x] [x] [x]  5 4 3 2 1 1 2 3 4 5                          Right                                        Left    Incurvation/Ingrowin   [] [] [] [] [] [] [] [] [] []  5 4 3 2 1 1 2 3 4 5                         Right                                        Left    Inflammation/Pain   [x] [x] [x] [x] [x] [x] [x] [x] [x] [x]  5 4 3  2 1 1 2 3 4 5                         Right                                        Left       Nails are painful 1-10 with direct palpation. Q7   []Yes  []No                Q8   [x]Yes  []No                     Q9   []Yes    []No  Assessment:  66 y.o. male with:    Diagnosis Orders   1. Dermatophytosis of nail  05178 - ME DEBRIDEMENT OF NAILS, 6 OR MORE      2. Bilateral lower extremity pain  96813 - ME DEBRIDEMENT OF NAILS, 6 OR MORE      3. PVD (peripheral vascular disease) ([facility]  58615 - ME DEBRIDEMENT OF NAILS, 6 OR MORE            Plan:   Pt was evaluated and examined. Patient was given personalized discharge instructions.   Nails 1-10 were debrided in length and thickness sharply with a nail nipper and  without incident. Pt will follow up in 9 weeks or sooner if any problems arise. Diagnosis was discussed with the pt and all of their questions were answered in detail. Proper foot hygiene and care was discussed with the pt. Patient to check feet daily and contact the office with any questions/problems/concerns. Other comorbidity noted and will be managed by PCP. Pain waiver discussed with patient and confirmed.    10/4/2022      Electronically signed by To Osborn DPM on 10/4/2022 at 9:45 AM  10/4/2022

## 2022-11-15 ENCOUNTER — OFFICE VISIT (OUTPATIENT)
Dept: UROLOGY | Age: 72
End: 2022-11-15
Payer: COMMERCIAL

## 2022-11-15 DIAGNOSIS — R39.15 URINARY URGENCY: Primary | ICD-10-CM

## 2022-11-15 DIAGNOSIS — R39.14 FEELING OF INCOMPLETE BLADDER EMPTYING: ICD-10-CM

## 2022-11-15 DIAGNOSIS — Z12.5 PROSTATE CANCER SCREENING: ICD-10-CM

## 2022-11-15 PROCEDURE — 99214 OFFICE O/P EST MOD 30 MIN: CPT | Performed by: UROLOGY

## 2022-11-15 PROCEDURE — 1123F ACP DISCUSS/DSCN MKR DOCD: CPT | Performed by: UROLOGY

## 2022-11-15 RX ORDER — TAMSULOSIN HYDROCHLORIDE 0.4 MG/1
0.4 CAPSULE ORAL DAILY
Qty: 30 CAPSULE | Refills: 11 | Status: SHIPPED | OUTPATIENT
Start: 2022-11-15

## 2022-11-15 ASSESSMENT — ENCOUNTER SYMPTOMS
COUGH: 0
WHEEZING: 0
SHORTNESS OF BREATH: 0

## 2022-11-15 NOTE — PROGRESS NOTES
[address] [facility] [id number]  Dept: [address] [facility] Urology Office Note - Established    Patient:  Jaime Antony  YOB: 1950  Date: 11/15/2022    The patient is a 66 y.o. male who presents todayfor evaluation of the following problems:   Chief Complaint   Patient presents with    6 Month Follow-Up     Med check-Myrbetriq is $100 a month, would like to know about generic or another option. Has not been taking flomax, was told to stop by another provider        HPI  Here for urinary complaints. He has been on Myrbetriq. He did not re-start Flomax. His urgency has improved with Myrbetriq. He does have issues with dry mouth. Summary of old records: N/A    Additional History: N/A    Procedures Today: N/A    Urinalysis today:  No results found for this visit on 11/15/22. Last several PSA's:  No results found for: PSA  Last total testosterone:  No results found for: TESTOSTERONE    AUA Symptom Score (11/15/2022):   INCOMPLETE EMPTYING: How often have you had the sensation of not emptying your bladder?: About Half the time  FREQUENCY: How often do you have to urinate less than every two hours?: Not at all  INTERMITTENCY: How often have you found you stopped and started again several times when you urinated?: Not at all  URGENCY: How often have you found it difficult to postpone urination?: Less than 1 to 5 times  WEAK STREAM: How often have you had a weak urinary stream?: Not at all  STRAINING: How often have you had to strain to start  urination?: Not at all  NOCTURIA: How many times did you typically get up at night to uriniate?: 2 Times  TOTAL I-PSS SCORE[de-identified] 6  How would you feel if you were to spend the rest of your life with your urinary condition?: Pleased    Last BUN and creatinine:  Lab Results   Component Value Date    BUN 12 09/16/2022     Lab Results   Component Value Date CREATININE 0.66 (L) 09/16/2022       Additional Lab/Culture results: none    Imaging Reviewed during this Office Visit: none  (results were independently reviewed by physician and radiology report verified)    PAST MEDICAL, FAMILY AND SOCIAL HISTORY UPDATE:  Past Medical History:   Diagnosis Date    Anxiety     Duodenitis     Fundic gland polyps of stomach, benign     Hyperlipidemia     Hypertension      Past Surgical History:   Procedure Laterality Date    APPENDECTOMY      BACK SURGERY      CHOLECYSTECTOMY      COLONOSCOPY  3/7/2012    tubular adenoma, hyperplastic polyp    COLONOSCOPY  05/28/2019    JOINT REPLACEMENT      hip    OTHER SURGICAL HISTORY  08/29/2016    Spinal stimulator    SPINAL CORD STIMULATOR SURGERY  08/29/2016    UPPER GASTROINTESTINAL ENDOSCOPY  05/28/2019    UPPER GASTROINTESTINAL ENDOSCOPY  10/15/2020     Family History   Problem Relation Age of Onset    Heart Disease Father     Hypertension Father     Cancer Sister         lung cancer    Hypertension Brother     Stroke Brother     Tuberculosis Brother      Outpatient Medications Marked as Taking for the 11/15/22 encounter (Office Visit) with Petey Mitchell MD   Medication Sig Dispense Refill    tamsulosin (FLOMAX) 0.4 MG capsule Take 1 capsule by mouth daily 30 capsule 11    diazePAM (VALIUM) 10 MG tablet TAKE 1 TABLET BY MOUTH FOR 1 DOSE   TAKE HALF HOUR BEFORE MRI      LORazepam (ATIVAN) 0.5 MG tablet TAKE 1 TABLET BY MOUTH EVERY SIX HOURS AS NEEDED FOR ANXIETY      Omega-3 Fatty Acids (FISH OIL) 1000 MG CAPS Take 3,000 mg by mouth 3 times daily      traZODone (DESYREL) 50 MG tablet TAKE 1 TABLET BY MOUTH AT BEDTIME 90 tablet 1    clopidogrel (PLAVIX) 75 MG tablet TAKE 1 TABLET BY MOUTH EVERY DAY 90 tablet 0    topiramate (TOPAMAX) 50 MG tablet TAKE 1 TABLET BY MOUTH TWO TIMES A  tablet 1    aspirin 81 MG EC tablet Take 81 mg by mouth in the morning.       pilocarpine (SALAGEN) 5 MG tablet Take 1 tablet by mouth 3 times daily 90 tablet 2    lisinopril (PRINIVIL;ZESTRIL) 10 MG tablet TAKE 1 TABLET BY MOUTH EVERY DAY 90 tablet 1    allopurinol (ZYLOPRIM) 300 MG tablet TAKE 1 TABLET BY MOUTH EVERY DAY 90 tablet 1    omeprazole (PRILOSEC) 20 MG delayed release capsule TAKE 1 CAPSULE BY MOUTH EVERY DAY 90 capsule 1    carvedilol (COREG) 6.25 MG tablet TAKE 1 TABLET BY MOUTH TWO TIMES A  tablet 1    atorvastatin (LIPITOR) 80 MG tablet TAKE 1 TABLET BY MOUTH ONE TIME A DAY 90 tablet 3    MYRBETRIQ 50 MG TB24 Take 50 mg by mouth daily 30 tablet 11    citalopram (CELEXA) 40 MG tablet TAKE 1 TABLET BY MOUTH EVERY DAY 90 tablet 3    dicyclomine (BENTYL) 10 MG capsule Take 1 capsule by mouth 4 times daily as needed (cramping) 120 capsule 0    ipratropium (ATROVENT) 0.06 % nasal spray INSTILL 2 SPRAYS INTO EACH NOSTRIL UP TO FOUR TIMES A DAY      isosorbide mononitrate (IMDUR) 30 MG extended release tablet TAKE 1 TABLET BY MOUTH EVERY DAY      nitroGLYCERIN (NITROSTAT) 0.4 MG SL tablet Place 1 tablet under the tongue every 5 minutes as needed for Chest pain up to max of 3 total doses.  If no relief after 1 dose, call 911. 25 tablet 3    vitamin B-1 (THIAMINE) 100 MG tablet Take 100 mg by mouth daily      OnabotulinumtoxinA (BOTOX IJ) Inject as directed Patient is taking for migraines      calcium carbonate (OSCAL) 500 MG TABS tablet Take 500 mg by mouth daily      magnesium 30 MG tablet Take 30 mg by mouth 2 times daily      Galcanezumab-gnlm 120 MG/ML SOAJ Inject into the skin      Acetaminophen (TYLENOL EXTRA STRENGTH PO) Take by mouth Indications: prn      POTASSIUM GLUCONATE PO Take by mouth daily         Cupric oxide, Chrome alum, Cobalt, Copper-containing compounds, and Nickel  Social History     Tobacco Use   Smoking Status Former    Packs/day: 2.00    Years: 35.00    Pack years: 70.00    Types: Cigarettes    Quit date: 2005    Years since quittin.0   Smokeless Tobacco Former    Types: Chew     (Ifpatient a smoker, smoking cessation counseling offered)    Social History     Substance and Sexual Activity   Alcohol Use Yes    Alcohol/week: 35.0 - 42.0 standard drinks    Types: 35 - 42 Cans of beer per week    Comment: 4-5 beers a day       REVIEW OF SYSTEMS:  Review of Systems    Physical Exam:    There were no vitals filed for this visit. There is no height or weight on file to calculate BMI. Patient is a 66 y.o. male in no acute distress and alert and oriented to person, place and time. Physical Exam  Constitutional: Patient in no acute distress. Neuro: Alert and oriented to person, place and time. Psych: Mood normal, affect normal  Lungs: Respiratory effort is normal  Cardiovascular: Warm & Pink  Abdomen: Soft, non-tender, non-distended with no CVA,  No flank tenderness,  Or hepatosplenomegaly   Lymphatics: No palpablelymphadenopathy. Bladder non-tender and not distended. Musculoskeletal: Normal gait and station    Assessment and Plan      1. Urinary urgency    2. Feeling of incomplete bladder emptying    3. Prostate cancer screening           Plan:         Would re-start Flomax. Will continue Myrbetriq. He cannot have anticholinergics due to monge mouth and constipation. Return in about 1 year (around 11/15/2023). Prescriptions Ordered:  Orders Placed This Encounter   Medications    tamsulosin (FLOMAX) 0.4 MG capsule     Sig: Take 1 capsule by mouth daily     Dispense:  30 capsule     Refill:  11       Orders Placed:  Orders Placed This Encounter   Procedures    PSA Screening     Standing Status:   Future     Standing Expiration Date:   11/15/2023             Salvador Winkler MD    Agree with the ROS entered by the MA.

## 2022-11-16 DIAGNOSIS — K31.7 GASTRIC POLYP: ICD-10-CM

## 2022-11-21 ENCOUNTER — TELEPHONE (OUTPATIENT)
Dept: GASTROENTEROLOGY | Age: 72
End: 2022-11-21

## 2022-11-21 NOTE — TELEPHONE ENCOUNTER
Writer faxed Clearance to Dr Arnaud Ramires for EGD to stop Plavix and ASA. Received clearance OK to stop ASA and Plavix for 5 days.    Writer called and let patients wife know

## 2022-11-22 DIAGNOSIS — F41.9 ANXIETY: Primary | ICD-10-CM

## 2022-11-22 RX ORDER — LORAZEPAM 0.5 MG/1
TABLET ORAL
Qty: 30 TABLET | Refills: 0 | Status: SHIPPED | OUTPATIENT
Start: 2022-11-22 | End: 2022-12-22

## 2022-11-23 RX ORDER — IPRATROPIUM BROMIDE 42 UG/1
SPRAY, METERED NASAL
Qty: 15 ML | Refills: 2 | Status: SHIPPED | OUTPATIENT
Start: 2022-11-23

## 2022-12-01 DIAGNOSIS — I10 ESSENTIAL HYPERTENSION: ICD-10-CM

## 2022-12-01 RX ORDER — CARVEDILOL 6.25 MG/1
TABLET ORAL
Qty: 180 TABLET | Refills: 0 | Status: SHIPPED | OUTPATIENT
Start: 2022-12-01

## 2022-12-01 RX ORDER — CLOPIDOGREL BISULFATE 75 MG/1
TABLET ORAL
Qty: 90 TABLET | Refills: 0 | Status: SHIPPED | OUTPATIENT
Start: 2022-12-01

## 2022-12-01 RX ORDER — LISINOPRIL 10 MG/1
TABLET ORAL
Qty: 90 TABLET | Refills: 0 | Status: SHIPPED | OUTPATIENT
Start: 2022-12-01

## 2022-12-05 RX ORDER — CLOPIDOGREL BISULFATE 75 MG/1
TABLET ORAL
Qty: 90 TABLET | Refills: 0 | OUTPATIENT
Start: 2022-12-05

## 2022-12-21 DIAGNOSIS — R10.30 LOWER ABDOMINAL PAIN: ICD-10-CM

## 2022-12-22 RX ORDER — DICYCLOMINE HYDROCHLORIDE 10 MG/1
10 CAPSULE ORAL 4 TIMES DAILY PRN
Qty: 120 CAPSULE | Refills: 1 | Status: SHIPPED | OUTPATIENT
Start: 2022-12-22

## 2022-12-23 RX ORDER — DICYCLOMINE HYDROCHLORIDE 10 MG/1
CAPSULE ORAL
Qty: 120 CAPSULE | Refills: 0 | OUTPATIENT
Start: 2022-12-23

## 2023-01-09 ENCOUNTER — OFFICE VISIT (OUTPATIENT)
Dept: GASTROENTEROLOGY | Age: 73
End: 2023-01-09
Payer: COMMERCIAL

## 2023-01-09 VITALS
HEART RATE: 92 BPM | HEIGHT: 69 IN | WEIGHT: 245 LBS | DIASTOLIC BLOOD PRESSURE: 77 MMHG | BODY MASS INDEX: 36.29 KG/M2 | SYSTOLIC BLOOD PRESSURE: 131 MMHG

## 2023-01-09 DIAGNOSIS — K58.1 IRRITABLE BOWEL SYNDROME WITH CONSTIPATION: ICD-10-CM

## 2023-01-09 DIAGNOSIS — K31.7 GASTRIC POLYP: Primary | ICD-10-CM

## 2023-01-09 DIAGNOSIS — D13.1 FUNDIC GLAND POLYPS OF STOMACH, BENIGN: ICD-10-CM

## 2023-01-09 PROCEDURE — 3075F SYST BP GE 130 - 139MM HG: CPT | Performed by: INTERNAL MEDICINE

## 2023-01-09 PROCEDURE — 3078F DIAST BP <80 MM HG: CPT | Performed by: INTERNAL MEDICINE

## 2023-01-09 PROCEDURE — 1123F ACP DISCUSS/DSCN MKR DOCD: CPT | Performed by: INTERNAL MEDICINE

## 2023-01-09 PROCEDURE — 99213 OFFICE O/P EST LOW 20 MIN: CPT | Performed by: INTERNAL MEDICINE

## 2023-01-09 RX ORDER — PILOCARPINE HYDROCHLORIDE 5 MG/1
TABLET, FILM COATED ORAL
Qty: 90 TABLET | Refills: 2 | Status: SHIPPED | OUTPATIENT
Start: 2023-01-09

## 2023-01-09 ASSESSMENT — ENCOUNTER SYMPTOMS
ABDOMINAL DISTENTION: 1
ALLERGIC/IMMUNOLOGIC NEGATIVE: 1
RESPIRATORY NEGATIVE: 1
ABDOMINAL PAIN: 1
CONSTIPATION: 1

## 2023-01-09 NOTE — PROGRESS NOTES
GI OFFICE FOLLOW UP    INTERVAL HISTORY:   No referring provider defined for this encounter. Chief Complaint   Patient presents with    Other     Pt here for egd f/u. Pt still having abd pain and bloating. Pt has appetite loss due to bloating he feels full faster. 1. Gastric polyp    2. Fundic gland polyps of stomach, benign    3. Irritable bowel syndrome with constipation              HISTORY OF PRESENT ILLNESS:   Patient seen along with his wife. Recently had a EGD done to check gastric polyps that had questionable dysplasia. Repeat EGD revealed a small benign looking gastric polyps, histology revealed fundic gland polyps. Biopsies from the duodenum unremarkable. 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 On further discussion patient does not have symptoms related to Candida esophagitis including heartburns odynophagia etc.  He is tolerating diet well and that he is gaining weight. He is not able to do exercise. Patient does have abdominal bloating and cramps. Has a constipation. In the past used to have loose bowels. It appears that patient has these symptoms for a long time. No change recently. His previous records reviewed. Gastrin levels 116. He is on chronic PPI therapy. Hemoglobin is about 14.6. Past Medical,Family, and Social History reviewed and does contribute to the patient presenting condition. Patient's PMH/PSH,SH,PSYCH Hx, MEDs, ALLERGIES, and ROS were all reviewed and updated in the appropriate sections.  Yes      PAST MEDICAL HISTORY:  Past Medical History:   Diagnosis Date    Anxiety     Duodenitis     Fundic gland polyps of stomach, benign     Hyperlipidemia     Hypertension        Past Surgical History:   Procedure Laterality Date    APPENDECTOMY      BACK SURGERY      CHOLECYSTECTOMY      COLONOSCOPY  3/7/2012 tubular adenoma, hyperplastic polyp    COLONOSCOPY  05/28/2019    JOINT REPLACEMENT      hip    OTHER SURGICAL HISTORY  08/29/2016    Spinal stimulator    SPINAL CORD STIMULATOR SURGERY  08/29/2016    UPPER GASTROINTESTINAL ENDOSCOPY  05/28/2019    UPPER GASTROINTESTINAL ENDOSCOPY  10/15/2020       CURRENT MEDICATIONS:    Current Outpatient Medications:     pilocarpine (SALAGEN) 5 MG tablet, TAKE 1 TABLET BY MOUTH THREE TIMES A DAY, Disp: 90 tablet, Rfl: 2    nystatin (MYCOSTATIN) 342838 UNIT/ML suspension, Take 5 mLs by mouth 4 times daily for 10 days Retain in mouth as long as possible, Disp: 200 mL, Rfl: 0    dicyclomine (BENTYL) 10 MG capsule, Take 1 capsule by mouth 4 times daily as needed (cramping), Disp: 120 capsule, Rfl: 1    lisinopril (PRINIVIL;ZESTRIL) 10 MG tablet, TAKE 1 TABLET BY MOUTH EVERY DAY, Disp: 90 tablet, Rfl: 0    clopidogrel (PLAVIX) 75 MG tablet, TAKE 1 TABLET BY MOUTH EVERY DAY, Disp: 90 tablet, Rfl: 0    carvedilol (COREG) 6.25 MG tablet, TAKE 1 TABLET BY MOUTH TWO TIMES A DAY, Disp: 180 tablet, Rfl: 0    ipratropium (ATROVENT) 0.06 % nasal spray, INSTILL 2 SPRAYS INTO EACH NOSTRIL UP TO FOUR TIMES A DAY, Disp: 15 mL, Rfl: 2    tamsulosin (FLOMAX) 0.4 MG capsule, Take 1 capsule by mouth daily, Disp: 30 capsule, Rfl: 11    diazePAM (VALIUM) 10 MG tablet, TAKE 1 TABLET BY MOUTH FOR 1 DOSE   TAKE HALF HOUR BEFORE MRI, Disp: , Rfl:     Omega-3 Fatty Acids (FISH OIL) 1000 MG CAPS, Take 3,000 mg by mouth 3 times daily, Disp: , Rfl:     traZODone (DESYREL) 50 MG tablet, TAKE 1 TABLET BY MOUTH AT BEDTIME, Disp: 90 tablet, Rfl: 1    topiramate (TOPAMAX) 50 MG tablet, TAKE 1 TABLET BY MOUTH TWO TIMES A DAY, Disp: 180 tablet, Rfl: 1    aspirin 81 MG EC tablet, Take 81 mg by mouth in the morning., Disp: , Rfl:     allopurinol (ZYLOPRIM) 300 MG tablet, TAKE 1 TABLET BY MOUTH EVERY DAY, Disp: 90 tablet, Rfl: 1    omeprazole (PRILOSEC) 20 MG delayed release capsule, TAKE 1 CAPSULE BY MOUTH EVERY DAY, Disp: 90 capsule, Rfl: 1    atorvastatin (LIPITOR) 80 MG tablet, TAKE 1 TABLET BY MOUTH ONE TIME A DAY, Disp: 90 tablet, Rfl: 3    MYRBETRIQ 50 MG TB24, Take 50 mg by mouth daily, Disp: 30 tablet, Rfl: 11    citalopram (CELEXA) 40 MG tablet, TAKE 1 TABLET BY MOUTH EVERY DAY, Disp: 90 tablet, Rfl: 3    isosorbide mononitrate (IMDUR) 30 MG extended release tablet, TAKE 1 TABLET BY MOUTH EVERY DAY, Disp: , Rfl:     nitroGLYCERIN (NITROSTAT) 0.4 MG SL tablet, Place 1 tablet under the tongue every 5 minutes as needed for Chest pain up to max of 3 total doses.  If no relief after 1 dose, call 911., Disp: 25 tablet, Rfl: 3    vitamin B-1 (THIAMINE) 100 MG tablet, Take 100 mg by mouth daily, Disp: , Rfl:     OnabotulinumtoxinA (BOTOX IJ), Inject as directed Patient is taking for migraines, Disp: , Rfl:     calcium carbonate (OSCAL) 500 MG TABS tablet, Take 500 mg by mouth daily, Disp: , Rfl:     magnesium 30 MG tablet, Take 30 mg by mouth 2 times daily, Disp: , Rfl:     Galcanezumab-gnlm 120 MG/ML SOAJ, Inject into the skin, Disp: , Rfl:     Acetaminophen (TYLENOL EXTRA STRENGTH PO), Take by mouth Indications: prn, Disp: , Rfl:     POTASSIUM GLUCONATE PO, Take by mouth daily, Disp: , Rfl:     pregabalin (LYRICA) 50 MG capsule, TAKE 1 CAPSULE BY MOUTH THREE TIMES A DAY, Disp: 90 capsule, Rfl: 2    tamsulosin (FLOMAX) 0.4 MG capsule, Take 1 capsule by mouth daily Take one capsule daily to facilitate passage of ureteral stone (Patient not taking: No sig reported), Disp: 30 capsule, Rfl: 6    ALLERGIES:   Allergies   Allergen Reactions    Cupric Oxide     Chrome Alum     Cobalt     Copper-Containing Compounds     Nickel        FAMILY HISTORY:       Problem Relation Age of Onset    Heart Disease Father     Hypertension Father     Cancer Sister         lung cancer    Hypertension Brother     Stroke Brother     Tuberculosis Brother          SOCIAL HISTORY:   Social History     Socioeconomic History    Marital status:  Spouse name: Not on file    Number of children: Not on file    Years of education: Not on file    Highest education level: Not on file   Occupational History    Not on file   Tobacco Use    Smoking status: Former     Packs/day: 2.00     Years: 35.00     Pack years: 70.00     Types: Cigarettes     Quit date: 2005     Years since quittin.1    Smokeless tobacco: Former     Types: Chew   Vaping Use    Vaping Use: Never used   Substance and Sexual Activity    Alcohol use: Yes     Alcohol/week: 35.0 - 42.0 standard drinks     Types: 35 - 42 Cans of beer per week     Comment: 4-5 beers a day    Drug use: No    Sexual activity: Not on file   Other Topics Concern    Not on file   Social History Narrative    Not on file     Social Determinants of Health     Financial Resource Strain: Low Risk     Difficulty of Paying Living Expenses: Not hard at all   Food Insecurity: No Food Insecurity    Worried About Running Out of Food in the Last Year: Never true    Ran Out of Food in the Last Year: Never true   Transportation Needs: Not on file   Physical Activity: Not on file   Stress: Not on file   Social Connections: Not on file   Intimate Partner Violence: Not on file   Housing Stability: Not on file         REVIEW OF SYSTEMS:         Review of Systems   Constitutional:  Positive for appetite change (loss). HENT: Negative. Eyes:  Positive for visual disturbance (glasses). Respiratory: Negative. Cardiovascular: Negative. Gastrointestinal:  Positive for abdominal distention, abdominal pain and constipation. Endocrine: Negative. Genitourinary: Negative. Musculoskeletal: Negative. Skin: Negative. Allergic/Immunologic: Negative. Neurological: Negative. Hematological:  Bruises/bleeds easily. Psychiatric/Behavioral: Negative. PHYSICAL EXAMINATION:     Vital signs reviewed per the nursing documentation.      /77   Pulse 92   Ht 5' 9\" (1.753 m)   Wt 245 lb (111.1 kg)   BMI 36.18 kg/m²   Body mass index is 36.18 kg/m². Physical Exam  Vitals and nursing note reviewed. Constitutional:       General: He is not in acute distress. Appearance: Normal appearance. He is well-developed. Comments: Moderately overweight patient. HENT:      Head: Normocephalic and atraumatic. Mouth/Throat:      Pharynx: No oropharyngeal exudate. Eyes:      General: No scleral icterus. Extraocular Movements: Extraocular movements intact. Conjunctiva/sclera: Conjunctivae normal.      Pupils: Pupils are equal, round, and reactive to light. Neck:      Thyroid: No thyromegaly. Trachea: No tracheal deviation. Cardiovascular:      Rate and Rhythm: Normal rate and regular rhythm. Heart sounds: Normal heart sounds. No murmur heard. Pulmonary:      Effort: Pulmonary effort is normal. No respiratory distress. Breath sounds: Normal breath sounds. No wheezing or rales. Abdominal:      General: Bowel sounds are normal. There is no distension. Palpations: Abdomen is soft. There is no hepatomegaly or mass. Tenderness: There is no abdominal tenderness. There is no guarding. Hernia: No hernia is present. Comments: No peripheral signs of ch. Liver disease, obese abdomen. Genitourinary:     Rectum: Normal.   Musculoskeletal:      Cervical back: Normal range of motion and neck supple. Lymphadenopathy:      Cervical: No cervical adenopathy. Skin:     General: Skin is warm and dry. Findings: No erythema or rash. Neurological:      General: No focal deficit present. Mental Status: He is alert and oriented to person, place, and time. Cranial Nerves: No cranial nerve deficit. Psychiatric:         Mood and Affect: Mood normal.         Behavior: Behavior normal.         Thought Content:  Thought content normal.         LABORATORY DATA: Reviewed  Lab Results   Component Value Date    WBC 6.6 09/16/2022    HGB 14.6 09/16/2022    HCT 43.5 09/16/2022 MCV 92.2 09/16/2022     09/16/2022     09/16/2022    K 4.5 09/16/2022     09/16/2022    CO2 29 09/16/2022    BUN 12 09/16/2022    CREATININE 0.66 (L) 09/16/2022    LABALBU 4.4 09/16/2022    BILITOT 0.5 09/16/2022    ALKPHOS 72 09/16/2022    AST 19 09/16/2022    ALT 23 09/16/2022         Lab Results   Component Value Date    RBC 4.72 09/16/2022    HGB 14.6 09/16/2022    MCV 92.2 09/16/2022    MCH 30.9 09/16/2022    MCHC 33.6 09/16/2022    RDW 13.5 09/16/2022    MPV 10.8 09/16/2022    BASOPCT 0.6 09/16/2022    LYMPHSABS 1.19 09/16/2022    MONOSABS 0.57 09/16/2022    NEUTROABS 4.45 09/16/2022    EOSABS 0.31 09/16/2022    BASOSABS 0.04 09/16/2022         DIAGNOSTIC TESTING:     No results found. Assessment  1. Gastric polyp    2. Fundic gland polyps of stomach, benign    3. Irritable bowel syndrome with constipation        Plan  Discussed with the patient and wife regarding EGD findings and reassured. Advised therapy for Candida esophagitis and patient prefers to take Mycostatin suspension 4 times a day for about 10 days. Patient is not on steroid therapy. Also discussed regarding possibility of constipation predominant IBS and management. Patient reassured. Strongly encouraged calorie restriction, to lose some weight. We will see him on as-needed basis. Thank you for allowing me to participate in the care of Mr. Mary Mckeon. For any further questions please do not hesitate to contact me. I have reviewed and agree with the ROS entered by the MA/LPN. Note is dictated utilizing voice recognition software. Unfortunately this leads to occasional typographical errors.  Please contact our office if you have any questions        Yanni Heard MD,FACP, [facility]  Board Certified in Gastroenterology and [address] [place] [address] Gastroenterology  Office #: [phone]

## 2023-01-11 DIAGNOSIS — K31.7 GASTRIC POLYP: ICD-10-CM

## 2023-01-26 DIAGNOSIS — F41.9 ANXIETY: ICD-10-CM

## 2023-01-26 DIAGNOSIS — R20.2 PARESTHESIA OF LEFT LEG: ICD-10-CM

## 2023-01-26 DIAGNOSIS — M79.605 LEG PAIN, LATERAL, LEFT: ICD-10-CM

## 2023-01-26 DIAGNOSIS — K20.80 CORROSIVE ESOPHAGITIS: ICD-10-CM

## 2023-01-26 DIAGNOSIS — M51.36 DISC DEGENERATION, LUMBAR: ICD-10-CM

## 2023-01-26 DIAGNOSIS — I10 ESSENTIAL HYPERTENSION: ICD-10-CM

## 2023-01-26 RX ORDER — ALLOPURINOL 300 MG/1
TABLET ORAL
Qty: 90 TABLET | Refills: 0 | Status: SHIPPED | OUTPATIENT
Start: 2023-01-26

## 2023-01-26 RX ORDER — PREGABALIN 50 MG/1
CAPSULE ORAL
Qty: 90 CAPSULE | Refills: 0 | Status: SHIPPED | OUTPATIENT
Start: 2023-01-26 | End: 2023-02-25

## 2023-01-26 RX ORDER — CARVEDILOL 6.25 MG/1
TABLET ORAL
Qty: 180 TABLET | Refills: 0 | Status: SHIPPED | OUTPATIENT
Start: 2023-01-26

## 2023-01-26 RX ORDER — CITALOPRAM 40 MG/1
TABLET ORAL
Qty: 90 TABLET | Refills: 0 | Status: SHIPPED | OUTPATIENT
Start: 2023-01-26

## 2023-01-26 RX ORDER — LISINOPRIL 10 MG/1
TABLET ORAL
Qty: 90 TABLET | Refills: 0 | Status: SHIPPED | OUTPATIENT
Start: 2023-01-26

## 2023-01-26 RX ORDER — OMEPRAZOLE 20 MG/1
CAPSULE, DELAYED RELEASE ORAL
Qty: 90 CAPSULE | Refills: 0 | Status: SHIPPED | OUTPATIENT
Start: 2023-01-26

## 2023-01-26 RX ORDER — TRAZODONE HYDROCHLORIDE 50 MG/1
TABLET ORAL
Qty: 90 TABLET | Refills: 0 | Status: SHIPPED | OUTPATIENT
Start: 2023-01-26

## 2023-01-26 RX ORDER — ATORVASTATIN CALCIUM 80 MG/1
TABLET, FILM COATED ORAL
Qty: 90 TABLET | Refills: 0 | Status: SHIPPED | OUTPATIENT
Start: 2023-01-26

## 2023-01-31 ENCOUNTER — OFFICE VISIT (OUTPATIENT)
Dept: PODIATRY | Age: 73
End: 2023-01-31
Payer: COMMERCIAL

## 2023-01-31 VITALS — WEIGHT: 245 LBS | HEIGHT: 69 IN | BODY MASS INDEX: 36.29 KG/M2

## 2023-01-31 DIAGNOSIS — M79.604 BILATERAL LOWER EXTREMITY PAIN: ICD-10-CM

## 2023-01-31 DIAGNOSIS — B35.1 DERMATOPHYTOSIS OF NAIL: Primary | ICD-10-CM

## 2023-01-31 DIAGNOSIS — M79.605 BILATERAL LOWER EXTREMITY PAIN: ICD-10-CM

## 2023-01-31 DIAGNOSIS — I73.9 PVD (PERIPHERAL VASCULAR DISEASE) (HCC): ICD-10-CM

## 2023-01-31 PROCEDURE — 99999 PR OFFICE/OUTPT VISIT,PROCEDURE ONLY: CPT | Performed by: PODIATRIST

## 2023-01-31 PROCEDURE — 11721 DEBRIDE NAIL 6 OR MORE: CPT | Performed by: PODIATRIST

## 2023-01-31 NOTE — PROGRESS NOTES
[address]  Dept: [phone]     PAIN PROGRESS NOTE  Date of patient's visit: 1/31/2023  Patient's Name:  Laxmi Sena YOB: 1950            Patient Care Team:  Janet Gibbs PA-C as PCP - General (Physician Assistant)  Janet Gibbs PA-C as PCP - [facility] Empaneled Provider  Miriam Meredith MD as Consulting Physician (Gastroenterology)  Molly Poon MD as Consulting Physician (Family Medicine)  Emma Puentes MD as Anesthesiologist (Pain Management)  Kathia Dumont MD as Consulting Physician (Urology)  Rodney Mcfadden MD as Consulting Physician (Cardiology)      Chief Complaint   Patient presents with    Nail Problem     Toenail trim    Foot Pain     Bl feet       Subjective: This Laxmi Sena comes to clinic for foot and nail care. Pt currently has complaint of thickened, painful, elongated nails that he/she cannot manage by themselves. Pt. Relates pain to nails with shoe gear. Pt's primary care physician is Janet Gibbs PA-C last seen September 8 2022.     Past Medical History:   Diagnosis Date    Anxiety     Duodenitis     Fundic gland polyps of stomach, benign     Hyperlipidemia     Hypertension        Allergies   Allergen Reactions    Cupric Oxide     Chrome Alum     Cobalt     Copper-Containing Compounds     Nickel     Other      Current Outpatient Medications on File Prior to Visit   Medication Sig Dispense Refill    pregabalin (LYRICA) 50 MG capsule TAKE 1 CAPSULE BY MOUTH THREE TIMES A DAY 90 capsule 0    lisinopril (PRINIVIL;ZESTRIL) 10 MG tablet TAKE 1 TABLET BY MOUTH EVERY DAY 90 tablet 0    carvedilol (COREG) 6.25 MG tablet TAKE 1 TABLET BY MOUTH TWO TIMES A  tablet 0    traZODone (DESYREL) 50 MG tablet TAKE 1 TABLET BY MOUTH AT BEDTIME 90 tablet 0    allopurinol (ZYLOPRIM) 300 MG tablet TAKE 1 TABLET BY MOUTH EVERY DAY 90 tablet 0    atorvastatin (LIPITOR) 80 MG tablet TAKE 1 TABLET BY MOUTH EVERY DAY 90 tablet 0    citalopram (CELEXA) 40 MG tablet TAKE 1 TABLET BY MOUTH EVERY DAY 90 tablet 0    omeprazole (PRILOSEC) 20 MG delayed release capsule TAKE 1 CAPSULE BY MOUTH EVERY DAY 90 capsule 0    pilocarpine (SALAGEN) 5 MG tablet TAKE 1 TABLET BY MOUTH THREE TIMES A DAY 90 tablet 2    dicyclomine (BENTYL) 10 MG capsule Take 1 capsule by mouth 4 times daily as needed (cramping) 120 capsule 1    clopidogrel (PLAVIX) 75 MG tablet TAKE 1 TABLET BY MOUTH EVERY DAY 90 tablet 0    ipratropium (ATROVENT) 0.06 % nasal spray INSTILL 2 SPRAYS INTO EACH NOSTRIL UP TO FOUR TIMES A DAY 15 mL 2    tamsulosin (FLOMAX) 0.4 MG capsule Take 1 capsule by mouth daily 30 capsule 11    diazePAM (VALIUM) 10 MG tablet TAKE 1 TABLET BY MOUTH FOR 1 DOSE   TAKE HALF HOUR BEFORE MRI      Omega-3 Fatty Acids (FISH OIL) 1000 MG CAPS Take 3,000 mg by mouth 3 times daily      topiramate (TOPAMAX) 50 MG tablet TAKE 1 TABLET BY MOUTH TWO TIMES A  tablet 1    aspirin 81 MG EC tablet Take 81 mg by mouth in the morning. MYRBETRIQ 50 MG TB24 Take 50 mg by mouth daily 30 tablet 11    isosorbide mononitrate (IMDUR) 30 MG extended release tablet TAKE 1 TABLET BY MOUTH EVERY DAY      nitroGLYCERIN (NITROSTAT) 0.4 MG SL tablet Place 1 tablet under the tongue every 5 minutes as needed for Chest pain up to max of 3 total doses.  If no relief after 1 dose, call 911. 25 tablet 3    tamsulosin (FLOMAX) 0.4 MG capsule Take 1 capsule by mouth daily Take one capsule daily to facilitate passage of ureteral stone 30 capsule 6    vitamin B-1 (THIAMINE) 100 MG tablet Take 100 mg by mouth daily      OnabotulinumtoxinA (BOTOX IJ) Inject as directed Patient is taking for migraines      calcium carbonate (OSCAL) 500 MG TABS tablet Take 500 mg by mouth daily      magnesium 30 MG tablet Take 30 mg by mouth 2 times daily      Galcanezumab-gnlm 120 MG/ML SOAJ Inject into the skin      Acetaminophen (TYLENOL EXTRA STRENGTH PO) Take by mouth Indications: prn      POTASSIUM GLUCONATE PO Take by mouth daily       No current facility-administered medications on file prior to visit.     Review of Systems.    Review of Systems:   History obtained from chart review and the patient  General ROS: negative for - chills, fatigue, fever, night sweats or weight gain  Constitutional: Negative for chills, diaphoresis, fatigue, fever and unexpected weight change.  Musculoskeletal: Positive for arthralgias, gait problem and joint swelling.  Neurological ROS: negative for - behavioral changes, confusion, headaches or seizures. Negative for weakness and numbness.   Dermatological ROS: negative for - mole changes, rash  Cardiovascular: Negative for leg swelling.   Gastrointestinal: Negative for constipation, diarrhea, nausea and vomiting.          Objective:  Dermatologic Exam:  Skin lesion/ulceration Absent .   Skin No rashes or nodules noted..   Skin is thin, with flaky sloughing skin as well as decreased hair growth to the lower leg  Small red hemosiderin deposits seen dorsal foot   Musculoskeletal:     1st MPJ ROM decreased, Bilateral.  Muscle strength 5/5, Bilateral.  Pain present upon palpation of toenails 1-5, Bilateral. decreased medial longitudinal arch, Bilateral.  Ankle ROM decreased,Bilateral.    Dorsally contracted digits present digits 2, Bilateral.     Vascular: DP pulses 1/4 bilateral.  PT pulses 0/4 bilateral.   CFT <5 seconds, Bilateral.  Hair growth absent to the level of the digits, Bilateral.  Edema present, Bilateral.  Varicosities absent, Bilateral. Erythema absent, Bilateral    Neurological: Sensation diminshed to light touch to level of digits, Bilateral.  Protective sensation intact 6/10 sites via 5.07/10g [place]-Ashlee Monofilament, Bilateral.  negative Tinel's, Bilateral.  negative Valleix sign, Bilateral.      Integument: Warm, dry, supple, Bilateral.  Open lesion absent,  Bilateral.  Interdigital maceration absent to web spaces 4, Bilateral.  Nails 1-5 left and 1-5 right thickened > 3.0 mm, dystrophic and crumbly, discolored with subungual debris. Fissures absent, Bilateral.   General: AAO x 3 in NAD. Derm  Toenail Description  Sites of Onychomycosis Involvement (Check affected area)  [x] [x] [x] [x] [x] [x] [x] [x] [x] [x]  5 4 3 2 1 1 2 3 4 5                          Right                                        Left    Thickness  [x] [x] [x] [x] [x] [x] [x] [x] [x] [x]  5 4 3 2 1 1 2 3 4 5                         Right                                        Left    Dystrophic Changes   [x] [x] [x] [x] [x] [x] [x] [x] [x] [x]  5 4 3 2 1 1 2 3 4 5                         Right                                        Left    Color  [x] [x] [x] [x] [x] [x] [x] [x] [x] [x]  5 4 3 2 1 1 2 3 4 5                          Right                                        Left    Incurvation/Ingrowin   [] [] [] [] [] [] [] [] [] []  5 4 3 2 1 1 2 3 4 5                         Right                                        Left    Inflammation/Pain   [x] [x] [x] [x] [x] [x] [x] [x] [x] [x]  5 4 3  2 1 1 2 3 4 5                         Right                                        Left       Nails are painful 1-10 with direct palpation. Q7   []Yes  []No                Q8   [x]Yes  []No                     Q9   []Yes    []No  Assessment:  74 y.o. male with:    Diagnosis Orders   1. Dermatophytosis of nail  40011 - RI DEBRIDEMENT OF NAILS, 6 OR MORE      2. Bilateral lower extremity pain  52166 - [place] DEBRIDEMENT OF NAILS, 6 OR MORE      3. PVD (peripheral vascular disease) ([facility] [address]  53869 - [place] DEBRIDEMENT OF NAILS, 6 OR MORE            Plan:   Pt was evaluated and examined. Patient was given personalized discharge instructions. Nails 1-10 were debrided in length and thickness sharply with a nail nipper and  without incident. Pt will follow up in 9 weeks or sooner if any problems arise. Diagnosis was discussed with the pt and all of their questions were answered in detail. Proper foot hygiene and care was discussed with the pt. Patient to check feet daily and contact the office with any questions/problems/concerns. Other comorbidity noted and will be managed by PCP. Pain waiver discussed with patient and confirmed.    1/31/2023      Electronically signed by Jaime Chen DPM on 1/31/2023 at 9:43 AM  1/31/2023

## 2023-02-15 DIAGNOSIS — F41.9 ANXIETY: ICD-10-CM

## 2023-02-16 RX ORDER — LORAZEPAM 0.5 MG/1
TABLET ORAL
Qty: 30 TABLET | Refills: 0 | Status: SHIPPED | OUTPATIENT
Start: 2023-02-16 | End: 2023-03-17

## 2023-03-07 RX ORDER — CLOPIDOGREL BISULFATE 75 MG/1
TABLET ORAL
Qty: 90 TABLET | Refills: 1 | Status: SHIPPED | OUTPATIENT
Start: 2023-03-07

## 2023-03-27 ENCOUNTER — OFFICE VISIT (OUTPATIENT)
Dept: PRIMARY CARE CLINIC | Age: 73
End: 2023-03-27
Payer: COMMERCIAL

## 2023-03-27 VITALS
BODY MASS INDEX: 35.84 KG/M2 | SYSTOLIC BLOOD PRESSURE: 128 MMHG | HEIGHT: 69 IN | DIASTOLIC BLOOD PRESSURE: 76 MMHG | HEART RATE: 72 BPM | OXYGEN SATURATION: 98 % | WEIGHT: 242 LBS

## 2023-03-27 DIAGNOSIS — I25.10 CORONARY ARTERY DISEASE INVOLVING NATIVE CORONARY ARTERY OF NATIVE HEART WITHOUT ANGINA PECTORIS: ICD-10-CM

## 2023-03-27 DIAGNOSIS — J44.9 CHRONIC OBSTRUCTIVE PULMONARY DISEASE, UNSPECIFIED COPD TYPE (HCC): ICD-10-CM

## 2023-03-27 DIAGNOSIS — I10 ESSENTIAL HYPERTENSION: ICD-10-CM

## 2023-03-27 DIAGNOSIS — K58.1 IRRITABLE BOWEL SYNDROME WITH CONSTIPATION: ICD-10-CM

## 2023-03-27 DIAGNOSIS — M25.552 LEFT HIP PAIN: ICD-10-CM

## 2023-03-27 DIAGNOSIS — G47.19 EXCESSIVE DAYTIME SLEEPINESS: Primary | ICD-10-CM

## 2023-03-27 PROBLEM — M75.101 NONTRAUMATIC TEAR OF RIGHT ROTATOR CUFF: Status: ACTIVE | Noted: 2022-11-17

## 2023-03-27 PROBLEM — K58.0 IRRITABLE BOWEL SYNDROME WITH DIARRHEA: Status: RESOLVED | Noted: 2021-02-09 | Resolved: 2023-03-27

## 2023-03-27 PROBLEM — H61.21 IMPACTED CERUMEN OF RIGHT EAR: Status: RESOLVED | Noted: 2020-08-14 | Resolved: 2023-03-27

## 2023-03-27 PROCEDURE — 3074F SYST BP LT 130 MM HG: CPT | Performed by: PHYSICIAN ASSISTANT

## 2023-03-27 PROCEDURE — 1123F ACP DISCUSS/DSCN MKR DOCD: CPT | Performed by: PHYSICIAN ASSISTANT

## 2023-03-27 PROCEDURE — 99214 OFFICE O/P EST MOD 30 MIN: CPT | Performed by: PHYSICIAN ASSISTANT

## 2023-03-27 PROCEDURE — 3078F DIAST BP <80 MM HG: CPT | Performed by: PHYSICIAN ASSISTANT

## 2023-03-27 RX ORDER — PLECANATIDE 3 MG/1
3 TABLET ORAL DAILY
Qty: 14 TABLET | Refills: 0 | COMMUNITY
Start: 2023-03-27

## 2023-03-27 RX ORDER — ALBUTEROL SULFATE 90 UG/1
AEROSOL, METERED RESPIRATORY (INHALATION)
COMMUNITY
Start: 2023-02-21

## 2023-03-27 RX ORDER — UMECLIDINIUM BROMIDE AND VILANTEROL TRIFENATATE 62.5; 25 UG/1; UG/1
POWDER RESPIRATORY (INHALATION)
COMMUNITY
Start: 2023-03-22

## 2023-03-27 ASSESSMENT — PATIENT HEALTH QUESTIONNAIRE - PHQ9
SUM OF ALL RESPONSES TO PHQ QUESTIONS 1-9: 1
1. LITTLE INTEREST OR PLEASURE IN DOING THINGS: 0
SUM OF ALL RESPONSES TO PHQ QUESTIONS 1-9: 1
SUM OF ALL RESPONSES TO PHQ QUESTIONS 1-9: 1
2. FEELING DOWN, DEPRESSED OR HOPELESS: 1
SUM OF ALL RESPONSES TO PHQ9 QUESTIONS 1 & 2: 1
SUM OF ALL RESPONSES TO PHQ QUESTIONS 1-9: 1

## 2023-03-27 ASSESSMENT — ENCOUNTER SYMPTOMS
APNEA: 0
SHORTNESS OF BREATH: 0
COLOR CHANGE: 0
COUGH: 0
ABDOMINAL PAIN: 0
CHEST TIGHTNESS: 0

## 2023-03-27 ASSESSMENT — COPD QUESTIONNAIRES: COPD: 1

## 2023-03-27 NOTE — PROGRESS NOTES
[facility] PRIMARY CARE  [address] [facility] [address]  Dept: [address] Cespedes is a 74 y.o. male who presents today for his medical conditions/complaints as noted below. Chief Complaint   Patient presents with    Hypertension     HtN check up. Patient does not check BP at home. COPD     Patient said he was dx with COPD by Tim Wills. On O2 when sleeping. HPI:     Hypertension  Pertinent negatives include no chest pain, headaches, palpitations or shortness of breath. COPD  There is no cough or shortness of breath. Pertinent negatives include no chest pain or headaches. REviewed GI, rheumatology and urology note  REviewed CT scan and PFT in [address] [place] [address] and started on Anoro and needs 02  Saw Dr. Jihan Joseph and heart is good  but has some calcification. Left hip is bothering especially lifting leg gets severe pain. X-ray was normal of hip replacement. N/T into left leg with laying and sitting. Still has stomach pain and bloating. Only has bowel movement every 2 days now.          LDL Calculated (mg/dL)   Date Value   09/16/2022 72   09/02/2021 75   10/23/2019 83       (goal LDL is <100)   AST (U/L)   Date Value   09/16/2022 19     ALT (U/L)   Date Value   09/16/2022 23     BUN (mg/dL)   Date Value   09/16/2022 12     BP Readings from Last 3 Encounters:   03/27/23 128/76   01/09/23 131/77   09/30/22 118/77          (goal 120/80)    Past Medical History:   Diagnosis Date    Anxiety     Duodenitis     Fundic gland polyps of stomach, benign     Hyperlipidemia     Hypertension       Past Surgical History:   Procedure Laterality Date    APPENDECTOMY      BACK SURGERY      CHOLECYSTECTOMY      COLONOSCOPY  3/7/2012    tubular adenoma, hyperplastic polyp    COLONOSCOPY  05/28/2019    JOINT REPLACEMENT      hip    OTHER SURGICAL HISTORY  08/29/2016    Spinal stimulator    SPINAL CORD STIMULATOR SURGERY  08/29/2016

## 2023-04-05 RX ORDER — PLECANATIDE 3 MG/1
3 TABLET ORAL DAILY
Qty: 30 TABLET | Refills: 3 | Status: SHIPPED | OUTPATIENT
Start: 2023-04-05 | End: 2023-04-09

## 2023-04-06 RX ORDER — PILOCARPINE HYDROCHLORIDE 5 MG/1
5 TABLET, FILM COATED ORAL 3 TIMES DAILY
Qty: 90 TABLET | Refills: 2 | Status: SHIPPED | OUTPATIENT
Start: 2023-04-06

## 2023-04-24 DIAGNOSIS — M25.552 LEFT HIP PAIN: ICD-10-CM

## 2023-05-01 DIAGNOSIS — F41.9 ANXIETY: ICD-10-CM

## 2023-05-01 RX ORDER — LORAZEPAM 0.5 MG/1
TABLET ORAL
Qty: 90 TABLET | Refills: 0 | Status: SHIPPED | OUTPATIENT
Start: 2023-05-01 | End: 2023-05-30

## 2023-05-02 ENCOUNTER — OFFICE VISIT (OUTPATIENT)
Dept: PODIATRY | Age: 73
End: 2023-05-02
Payer: COMMERCIAL

## 2023-05-02 VITALS — WEIGHT: 242 LBS | BODY MASS INDEX: 35.84 KG/M2 | HEIGHT: 69 IN

## 2023-05-02 DIAGNOSIS — M79.605 BILATERAL LOWER EXTREMITY PAIN: ICD-10-CM

## 2023-05-02 DIAGNOSIS — M79.604 BILATERAL LOWER EXTREMITY PAIN: ICD-10-CM

## 2023-05-02 DIAGNOSIS — B35.1 DERMATOPHYTOSIS OF NAIL: Primary | ICD-10-CM

## 2023-05-02 DIAGNOSIS — I73.9 PVD (PERIPHERAL VASCULAR DISEASE) (HCC): ICD-10-CM

## 2023-05-02 PROCEDURE — 99999 PR OFFICE/OUTPT VISIT,PROCEDURE ONLY: CPT | Performed by: PODIATRIST

## 2023-05-02 PROCEDURE — 11721 DEBRIDE NAIL 6 OR MORE: CPT | Performed by: PODIATRIST

## 2023-05-02 NOTE — PROGRESS NOTES
[address]  Dept: [phone]     PAIN PROGRESS NOTE  Date of patient's visit: 5/2/2023  Patient's Name:  Marj Velasquez YOB: 1950            Patient Care Team:  Meenakshi Lehman PA-C as PCP - General (Physician Assistant)  Meenakshi Lehman PA-C as PCP - Empaneled Provider  Evelyn Tatum MD as Consulting Physician (Gastroenterology)  Chastity De Jesus MD as Consulting Physician (Family Medicine)  Niko Richards MD as Anesthesiologist (Pain Management)  Kristina Morrison MD as Consulting Physician (Urology)  Sukhi Dumont MD as Consulting Physician (Cardiology)      Chief Complaint   Patient presents with    Nail Problem     Toenail trim    Foot Pain     Bl feet       Subjective: This Marj Velasquez comes to clinic for foot and nail care. Pt currently has complaint of thickened, painful, elongated nails that he/she cannot manage by themselves. Pt. Relates pain to nails with shoe gear. Pt's primary care physician is Meenakshi Lehman PA-C last seen march 27 2023.     Past Medical History:   Diagnosis Date    Anxiety     Duodenitis     Fundic gland polyps of stomach, benign     Hyperlipidemia     Hypertension        Allergies   Allergen Reactions    Cupric Oxide     Chrome Alum     Cobalt     Copper-Containing Compounds     Nickel     Other      Current Outpatient Medications on File Prior to Visit   Medication Sig Dispense Refill    LORazepam (ATIVAN) 0.5 MG tablet TAKE 1 TABLET BY MOUTH EVERY SIX HOURS AS NEEDED FOR ANXIETY 90 tablet 0    linaclotide (LINZESS) 145 MCG capsule Take 1 capsule by mouth every morning (before breakfast) 30 capsule 2    pilocarpine (SALAGEN) 5 MG tablet Take 1 tablet by mouth 3 times daily 90 tablet 2    ANORO ELLIPTA 62.5-25 MCG/ACT inhaler INHALE 1 PUFF BY MOUTH IN THE MORNING      albuterol sulfate HFA

## 2023-05-15 RX ORDER — ATORVASTATIN CALCIUM 80 MG/1
TABLET, FILM COATED ORAL
Qty: 90 TABLET | Refills: 3 | Status: SHIPPED | OUTPATIENT
Start: 2023-05-15

## 2023-05-15 NOTE — TELEPHONE ENCOUNTER
LAST VISIT:   3/27/2023     Future Appointments   Date Time Provider Maynor Fely   6/20/2023 11:30 AM PENELOPE Arzate [facility]   6/27/2023 10:15 AM YVETTE Ivan [facility]   8/3/2023 10:00 AM PENELOPE Arzate [facility]

## 2023-05-27 DIAGNOSIS — R39.15 URINARY URGENCY: ICD-10-CM

## 2023-05-30 DIAGNOSIS — I10 ESSENTIAL HYPERTENSION: ICD-10-CM

## 2023-05-30 DIAGNOSIS — K20.80 CORROSIVE ESOPHAGITIS: ICD-10-CM

## 2023-05-30 DIAGNOSIS — F41.9 ANXIETY: ICD-10-CM

## 2023-05-30 RX ORDER — OMEPRAZOLE 20 MG/1
CAPSULE, DELAYED RELEASE ORAL
Qty: 90 CAPSULE | Refills: 0 | Status: SHIPPED | OUTPATIENT
Start: 2023-05-30

## 2023-05-30 RX ORDER — CARVEDILOL 6.25 MG/1
TABLET ORAL
Qty: 180 TABLET | Refills: 1 | Status: SHIPPED | OUTPATIENT
Start: 2023-05-30

## 2023-05-30 RX ORDER — CITALOPRAM 40 MG/1
TABLET ORAL
Qty: 90 TABLET | Refills: 0 | Status: SHIPPED | OUTPATIENT
Start: 2023-05-30 | End: 2023-07-05

## 2023-05-30 RX ORDER — LISINOPRIL 10 MG/1
TABLET ORAL
Qty: 90 TABLET | Refills: 0 | Status: SHIPPED | OUTPATIENT
Start: 2023-05-30 | End: 2023-06-05

## 2023-05-30 RX ORDER — TRAZODONE HYDROCHLORIDE 50 MG/1
TABLET ORAL
Qty: 90 TABLET | Refills: 1 | Status: SHIPPED | OUTPATIENT
Start: 2023-05-30 | End: 2023-07-27 | Stop reason: ALTCHOICE

## 2023-05-30 RX ORDER — ALLOPURINOL 300 MG/1
TABLET ORAL
Qty: 90 TABLET | Refills: 1 | Status: SHIPPED | OUTPATIENT
Start: 2023-05-30

## 2023-05-31 ENCOUNTER — TELEPHONE (OUTPATIENT)
Dept: UROLOGY | Age: 73
End: 2023-05-31

## 2023-06-04 RX ORDER — MIRABEGRON 50 MG/1
TABLET, FILM COATED, EXTENDED RELEASE ORAL
Qty: 30 TABLET | Refills: 5 | Status: SHIPPED | OUTPATIENT
Start: 2023-06-04

## 2023-06-05 RX ORDER — LISINOPRIL 10 MG/1
TABLET ORAL
Qty: 90 TABLET | Refills: 0 | OUTPATIENT
Start: 2023-06-05

## 2023-06-05 RX ORDER — LISINOPRIL 10 MG/1
TABLET ORAL
Qty: 90 TABLET | Refills: 1 | Status: SHIPPED | OUTPATIENT
Start: 2023-06-05

## 2023-06-11 DIAGNOSIS — K20.80 CORROSIVE ESOPHAGITIS: ICD-10-CM

## 2023-06-12 RX ORDER — OMEPRAZOLE 20 MG/1
CAPSULE, DELAYED RELEASE ORAL
Qty: 90 CAPSULE | Refills: 0 | OUTPATIENT
Start: 2023-06-12

## 2023-06-19 DIAGNOSIS — R10.30 LOWER ABDOMINAL PAIN: ICD-10-CM

## 2023-06-19 RX ORDER — DICYCLOMINE HYDROCHLORIDE 10 MG/1
CAPSULE ORAL
Qty: 120 CAPSULE | Refills: 1 | Status: SHIPPED | OUTPATIENT
Start: 2023-06-19

## 2023-06-19 NOTE — TELEPHONE ENCOUNTER
LAST VISIT:   3/27/2023     Future Appointments   Date Time Provider [address]   6/20/2023 11:30 AM PENELOPE Justin [facility]   6/27/2023 10:15 AM YVETTE Beal [facility]   8/3/2023 10:00 AM [facility]

## 2023-06-20 ENCOUNTER — OFFICE VISIT (OUTPATIENT)
Dept: PODIATRY | Age: 73
End: 2023-06-20

## 2023-06-20 VITALS — HEIGHT: 69 IN | BODY MASS INDEX: 35.84 KG/M2 | WEIGHT: 242 LBS

## 2023-06-20 DIAGNOSIS — M77.51 BURSITIS OF RIGHT ANKLE: ICD-10-CM

## 2023-06-20 DIAGNOSIS — M25.571 ACUTE RIGHT ANKLE PAIN: Primary | ICD-10-CM

## 2023-06-20 DIAGNOSIS — M79.604 PAIN OF RIGHT LOWER EXTREMITY: ICD-10-CM

## 2023-06-20 NOTE — PROGRESS NOTES
[address].  Dept: [phone]    RETURN PATIENT PROGRESS NOTE  Date of patient's visit: 6/20/2023  Patient's Name:  Orion Askew YOB: 1950            Patient Care Team:  Curt Rowan PA-C as PCP - General (Physician Assistant)  Curt Rowan PA-C as PCP - Empaneled Provider  Esmer Hernández MD as Consulting Physician (Gastroenterology)  Evaline Kocher, MD as Consulting Physician (Family Medicine)  Jeferson River MD as Anesthesiologist (Pain Management)  French Cueto MD as Consulting Physician (Urology)  Leonora Bagley MD as Consulting Physician (Cardiology)       Orion Askew 74 y.o. male that presents for follow-up of   Chief Complaint   Patient presents with    Ankle Pain     Right ankle     Pt's primary care physician is Curt Rowan PA-C last seen march 27 2023  Symptoms began several month(s) ago and are increased to right ankle . Patient relates pain is Present. Pain is rated 4 out of 10 and is described as intermittent. Treatments prior to today's visit include: previous podiatry treatment. Currently denies F/C/N/V. Allergies   Allergen Reactions    Cupric Oxide     Chrome Alum     Cobalt     Copper-Containing Compounds     Nickel     Other        Past Medical History:   Diagnosis Date    Anxiety     Duodenitis     Fundic gland polyps of stomach, benign     Hyperlipidemia     Hypertension        Prior to Admission medications    Medication Sig Start Date End Date Taking?  Authorizing Provider   dicyclomine (BENTYL) 10 MG capsule TAKE 1 CAPSULE BY MOUTH FOUR TIMES A DAY AS NEEDED for cramping 6/19/23  Yes Curt Rowan PA-C   lisinopril (PRINIVIL;ZESTRIL) 10 MG tablet TAKE 1 TABLET BY MOUTH EVERY DAY 6/5/23  Yes Curt Rowan PA-C   MYRBETRIQ 50 MG TB24 TAKE 1 TABLET BY MOUTH EVERY DAY 6/4/23  Yes Keagan

## 2023-06-25 RX ORDER — BETAMETHASONE SODIUM PHOSPHATE AND BETAMETHASONE ACETATE 3; 3 MG/ML; MG/ML
6 INJECTION, SUSPENSION INTRA-ARTICULAR; INTRALESIONAL; INTRAMUSCULAR; SOFT TISSUE ONCE
Status: SHIPPED | OUTPATIENT
Start: 2023-06-25

## 2023-07-02 DIAGNOSIS — F41.9 ANXIETY: ICD-10-CM

## 2023-07-03 ENCOUNTER — TELEPHONE (OUTPATIENT)
Dept: UROLOGY | Age: 73
End: 2023-07-03

## 2023-07-05 RX ORDER — IPRATROPIUM BROMIDE 42 UG/1
SPRAY, METERED NASAL
Qty: 15 ML | Refills: 5 | Status: SHIPPED | OUTPATIENT
Start: 2023-07-05

## 2023-07-05 RX ORDER — CITALOPRAM 40 MG/1
TABLET ORAL
Qty: 90 TABLET | Refills: 1 | Status: SHIPPED | OUTPATIENT
Start: 2023-07-05

## 2023-07-05 NOTE — TELEPHONE ENCOUNTER
LAST VISIT:   3/27/2023     Future Appointments   Date Time Provider [address]   7/13/2023  9:15 AM YVETTE Mchugh [facility]   8/3/2023 10:00 AM [facility]

## 2023-07-05 NOTE — TELEPHONE ENCOUNTER
LAST VISIT:   3/27/2023     Future Appointments   Date Time Provider [address]   7/13/2023  9:15 AM YVETTE Alves [facility]   8/3/2023 10:00 AM [facility]

## 2023-07-26 ENCOUNTER — OFFICE VISIT (OUTPATIENT)
Dept: PRIMARY CARE CLINIC | Age: 73
End: 2023-07-26
Payer: COMMERCIAL

## 2023-07-26 VITALS
HEART RATE: 64 BPM | BODY MASS INDEX: 36.2 KG/M2 | WEIGHT: 244.4 LBS | HEIGHT: 69 IN | SYSTOLIC BLOOD PRESSURE: 120 MMHG | DIASTOLIC BLOOD PRESSURE: 50 MMHG | OXYGEN SATURATION: 94 %

## 2023-07-26 DIAGNOSIS — F41.9 ANXIETY: ICD-10-CM

## 2023-07-26 DIAGNOSIS — R79.89 OTHER SPECIFIED ABNORMAL FINDINGS OF BLOOD CHEMISTRY: ICD-10-CM

## 2023-07-26 DIAGNOSIS — R06.02 SOB (SHORTNESS OF BREATH): ICD-10-CM

## 2023-07-26 DIAGNOSIS — F10.10 EXCESSIVE DRINKING ALCOHOL: ICD-10-CM

## 2023-07-26 DIAGNOSIS — R23.1 PALENESS: ICD-10-CM

## 2023-07-26 DIAGNOSIS — Z00.00 MEDICARE ANNUAL WELLNESS VISIT, SUBSEQUENT: Primary | ICD-10-CM

## 2023-07-26 DIAGNOSIS — R41.3 MEMORY LOSS: ICD-10-CM

## 2023-07-26 DIAGNOSIS — R53.82 CHRONIC FATIGUE: ICD-10-CM

## 2023-07-26 PROCEDURE — 1123F ACP DISCUSS/DSCN MKR DOCD: CPT | Performed by: PHYSICIAN ASSISTANT

## 2023-07-26 PROCEDURE — 3078F DIAST BP <80 MM HG: CPT | Performed by: PHYSICIAN ASSISTANT

## 2023-07-26 PROCEDURE — G0439 PPPS, SUBSEQ VISIT: HCPCS | Performed by: PHYSICIAN ASSISTANT

## 2023-07-26 PROCEDURE — 3074F SYST BP LT 130 MM HG: CPT | Performed by: PHYSICIAN ASSISTANT

## 2023-07-26 RX ORDER — LORAZEPAM 0.5 MG/1
0.5 TABLET ORAL 3 TIMES DAILY PRN
Qty: 12 TABLET | Refills: 0
Start: 2023-07-26 | End: 2023-08-25

## 2023-07-26 SDOH — ECONOMIC STABILITY: FOOD INSECURITY: WITHIN THE PAST 12 MONTHS, THE FOOD YOU BOUGHT JUST DIDN'T LAST AND YOU DIDN'T HAVE MONEY TO GET MORE.: NEVER TRUE

## 2023-07-26 SDOH — ECONOMIC STABILITY: FOOD INSECURITY: WITHIN THE PAST 12 MONTHS, YOU WORRIED THAT YOUR FOOD WOULD RUN OUT BEFORE YOU GOT MONEY TO BUY MORE.: SOMETIMES TRUE

## 2023-07-26 SDOH — ECONOMIC STABILITY: INCOME INSECURITY: HOW HARD IS IT FOR YOU TO PAY FOR THE VERY BASICS LIKE FOOD, HOUSING, MEDICAL CARE, AND HEATING?: SOMEWHAT HARD

## 2023-07-26 SDOH — ECONOMIC STABILITY: HOUSING INSECURITY
IN THE LAST 12 MONTHS, WAS THERE A TIME WHEN YOU DID NOT HAVE A STEADY PLACE TO SLEEP OR SLEPT IN A SHELTER (INCLUDING NOW)?: NO

## 2023-07-26 ASSESSMENT — LIFESTYLE VARIABLES
HOW OFTEN DURING THE LAST YEAR HAVE YOU BEEN UNABLE TO REMEMBER WHAT HAPPENED THE NIGHT BEFORE BECAUSE YOU HAD BEEN DRINKING: 0
HOW OFTEN DO YOU HAVE A DRINK CONTAINING ALCOHOL: 4 OR MORE TIMES A WEEK
HAS A RELATIVE, FRIEND, DOCTOR, OR ANOTHER HEALTH PROFESSIONAL EXPRESSED CONCERN ABOUT YOUR DRINKING OR SUGGESTED YOU CUT DOWN: 0
HOW OFTEN DURING THE LAST YEAR HAVE YOU FAILED TO DO WHAT WAS NORMALLY EXPECTED FROM YOU BECAUSE OF DRINKING: 0
HOW OFTEN DURING THE LAST YEAR HAVE YOU FOUND THAT YOU WERE NOT ABLE TO STOP DRINKING ONCE YOU HAD STARTED: 0
HOW OFTEN DURING THE LAST YEAR HAVE YOU NEEDED AN ALCOHOLIC DRINK FIRST THING IN THE MORNING TO GET YOURSELF GOING AFTER A NIGHT OF HEAVY DRINKING: 0
HOW OFTEN DURING THE LAST YEAR HAVE YOU HAD A FEELING OF GUILT OR REMORSE AFTER DRINKING: 0
HOW MANY STANDARD DRINKS CONTAINING ALCOHOL DO YOU HAVE ON A TYPICAL DAY: 5 OR 6
HAVE YOU OR SOMEONE ELSE BEEN INJURED AS A RESULT OF YOUR DRINKING: 0

## 2023-07-26 ASSESSMENT — PATIENT HEALTH QUESTIONNAIRE - PHQ9
SUM OF ALL RESPONSES TO PHQ QUESTIONS 1-9: 0
1. LITTLE INTEREST OR PLEASURE IN DOING THINGS: 0

## 2023-07-27 LAB
ABSOLUTE BASO #: 0.04 K/UL (ref 0–0.2)
ABSOLUTE EOS #: 0.44 K/UL (ref 0–0.5)
ABSOLUTE LYMPH #: 1.59 K/UL (ref 1–4)
ABSOLUTE MONO #: 0.79 K/UL (ref 0.2–1)
ABSOLUTE NEUT #: 4.06 K/UL (ref 1.5–7.5)
ALBUMIN SERPL-MCNC: 4 G/DL (ref 3.5–5.2)
ALK PHOSPHATASE: 72 U/L (ref 40–125)
ALT SERPL-CCNC: 30 U/L (ref 5–50)
AMMONIA: 20
AST SERPL-CCNC: 24 U/L (ref 9–50)
BASOPHILS RELATIVE PERCENT: 0.6 %
BILIRUB SERPL-MCNC: 0.3 MG/DL
BILIRUBIN DIRECT: <0.2 MG/DL (ref 0–0.3)
EOSINOPHILS RELATIVE PERCENT: 6.3 %
FERRITIN: 12 NG/ML (ref 30–400)
HCT VFR BLD CALC: 33.4 % (ref 40–51)
HEMOGLOBIN: 10.5 G/DL (ref 13.5–17)
IRON SATURATION: 7 % (ref 20–50)
IRON, SERUM: 25 UG/DL (ref 59–158)
LYMPHOCYTE %: 22.9 %
MCH RBC QN AUTO: 23.6 PG (ref 25–33)
MCHC RBC AUTO-ENTMCNC: 31.4 G/DL (ref 31–36)
MCV RBC AUTO: 75.1 FL (ref 80–99)
MONOCYTES # BLD: 11.4 %
NEUTROPHILS RELATIVE PERCENT: 58.5 %
PDW BLD-RTO: 17.4 % (ref 11.5–15)
PLATELETS: 226 K/UL (ref 130–400)
PMV BLD AUTO: 10.5 FL (ref 9.3–13)
RBC: 4.45 M/UL (ref 4.5–6.1)
TOTAL IRON BINDING CAPACITY: 355 UG/DL (ref 250–450)
TOTAL PROTEIN: 6.2 G/DL (ref 6.1–8.3)
UNSATURATED IRON BINDING CAPACITY: 330 UG/DL (ref 112–347)
WBC: 6.9 K/UL (ref 3.5–11)

## 2023-08-03 ENCOUNTER — OFFICE VISIT (OUTPATIENT)
Dept: PODIATRY | Age: 73
End: 2023-08-03
Payer: COMMERCIAL

## 2023-08-03 VITALS — BODY MASS INDEX: 36.14 KG/M2 | WEIGHT: 244 LBS | HEIGHT: 69 IN

## 2023-08-03 DIAGNOSIS — M79.604 BILATERAL LOWER EXTREMITY PAIN: ICD-10-CM

## 2023-08-03 DIAGNOSIS — I73.9 PVD (PERIPHERAL VASCULAR DISEASE) (HCC): ICD-10-CM

## 2023-08-03 DIAGNOSIS — M19.071 DJD (DEGENERATIVE JOINT DISEASE), ANKLE AND FOOT, RIGHT: ICD-10-CM

## 2023-08-03 DIAGNOSIS — M77.51 BURSITIS OF RIGHT ANKLE: ICD-10-CM

## 2023-08-03 DIAGNOSIS — B35.1 DERMATOPHYTOSIS OF NAIL: Primary | ICD-10-CM

## 2023-08-03 DIAGNOSIS — M79.605 BILATERAL LOWER EXTREMITY PAIN: ICD-10-CM

## 2023-08-03 PROCEDURE — 1123F ACP DISCUSS/DSCN MKR DOCD: CPT | Performed by: PODIATRIST

## 2023-08-03 PROCEDURE — 99213 OFFICE O/P EST LOW 20 MIN: CPT | Performed by: PODIATRIST

## 2023-08-03 PROCEDURE — 11721 DEBRIDE NAIL 6 OR MORE: CPT | Performed by: PODIATRIST

## 2023-08-03 NOTE — PROGRESS NOTES
[address] [facility]  Dept: [phone]     PAIN PROGRESS NOTE  Date of patient's visit: 8/3/2023  Patient's Name:  Lizzie Alfonso YOB: 1950            Patient Care Team:  Emmitt Brunner, PA-C as PCP - General (Physician Assistant)  Emmitt Brunner, PA-C as PCP - Empaneled Provider  Nithya Hartmann MD as Consulting Physician (Gastroenterology)  Dick Posada MD as Consulting Physician (Family Medicine)  Roberta Boyce MD as Anesthesiologist (Pain Management)  Maikel Christian MD as Consulting Physician (Urology)  Delores Rivas MD as Consulting Physician (Cardiology)      Chief Complaint   Patient presents with    Nail Problem     Toenail trim    Ankle Pain     Right ankle    Foot Pain     Bl feet       Subjective: This Lizzie Alfonso comes to clinic for foot and nail care. Pt currently has complaint of thickened, painful, elongated nails that he/she cannot manage by themselves. Pt. Relates pain to nails with shoe gear. Pt's primary care physician is Emmitt Brunner, PA-C last seen jul 26 2023. Pt has a new complaint of continuing right ankle pain. Pt has tried changing shoes but it has not helped the pain    Past Medical History:   Diagnosis Date    Anxiety     Duodenitis     Fundic gland polyps of stomach, benign     Hyperlipidemia     Hypertension        Allergies   Allergen Reactions    Cupric Oxide     Chrome Alum     Cobalt     Copper-Containing Compounds     Nickel     Other      Current Outpatient Medications on File Prior to Visit   Medication Sig Dispense Refill    LORazepam (ATIVAN) 0.5 MG tablet Take 1 tablet by mouth 3 times daily as needed for Anxiety for up to 30 days.  Max Daily Amount: 1.5 mg 12 tablet 0    citalopram (CELEXA) 40 MG tablet TAKE 1 TABLET BY MOUTH EVERY DAY 90 tablet 1    ipratropium (ATROVENT) 0.06 % nasal spray INSTILL 2

## 2023-08-09 DIAGNOSIS — R20.2 PARESTHESIA OF LEFT LEG: ICD-10-CM

## 2023-08-09 DIAGNOSIS — M79.605 LEG PAIN, LATERAL, LEFT: ICD-10-CM

## 2023-08-09 DIAGNOSIS — M51.36 DISC DEGENERATION, LUMBAR: ICD-10-CM

## 2023-08-09 RX ORDER — PREGABALIN 50 MG/1
CAPSULE ORAL
Qty: 90 CAPSULE | Refills: 2 | Status: SHIPPED | OUTPATIENT
Start: 2023-08-09 | End: 2023-10-09

## 2023-08-09 NOTE — TELEPHONE ENCOUNTER
LAST VISIT:   7/26/2023     Future Appointments   Date Time Provider [address]   8/10/2023  8:30 AM Julio Moody, YVETTE Diaz

## 2023-08-10 ENCOUNTER — OFFICE VISIT (OUTPATIENT)
Dept: PRIMARY CARE CLINIC | Age: 73
End: 2023-08-10
Payer: COMMERCIAL

## 2023-08-10 VITALS
SYSTOLIC BLOOD PRESSURE: 116 MMHG | DIASTOLIC BLOOD PRESSURE: 60 MMHG | OXYGEN SATURATION: 95 % | WEIGHT: 247.6 LBS | HEART RATE: 60 BPM | BODY MASS INDEX: 36.67 KG/M2 | HEIGHT: 69 IN

## 2023-08-10 DIAGNOSIS — R41.3 MEMORY LOSS: ICD-10-CM

## 2023-08-10 DIAGNOSIS — D64.9 LOW HEMOGLOBIN: Primary | ICD-10-CM

## 2023-08-10 PROCEDURE — 3078F DIAST BP <80 MM HG: CPT | Performed by: PHYSICIAN ASSISTANT

## 2023-08-10 PROCEDURE — 99214 OFFICE O/P EST MOD 30 MIN: CPT | Performed by: PHYSICIAN ASSISTANT

## 2023-08-10 PROCEDURE — 3074F SYST BP LT 130 MM HG: CPT | Performed by: PHYSICIAN ASSISTANT

## 2023-08-10 PROCEDURE — 1123F ACP DISCUSS/DSCN MKR DOCD: CPT | Performed by: PHYSICIAN ASSISTANT

## 2023-08-10 RX ORDER — AMOXICILLIN 500 MG/1
CAPSULE ORAL
COMMUNITY
Start: 2023-07-31

## 2023-08-10 ASSESSMENT — ENCOUNTER SYMPTOMS
COLOR CHANGE: 1
ANAL BLEEDING: 0
BLOOD IN STOOL: 0

## 2023-08-10 NOTE — PROGRESS NOTES
OR SHORTNESS OF BREATH      clopidogrel (PLAVIX) 75 MG tablet TAKE 1 TABLET BY MOUTH EVERY DAY 90 tablet 1    Omega-3 Fatty Acids (FISH OIL) 1000 MG CAPS Take 3 capsules by mouth 3 times daily      topiramate (TOPAMAX) 50 MG tablet TAKE 1 TABLET BY MOUTH TWO TIMES A  tablet 1    aspirin 81 MG EC tablet Take 1 tablet by mouth daily      isosorbide mononitrate (IMDUR) 30 MG extended release tablet TAKE 1 TABLET BY MOUTH EVERY DAY      nitroGLYCERIN (NITROSTAT) 0.4 MG SL tablet Place 1 tablet under the tongue every 5 minutes as needed for Chest pain up to max of 3 total doses.  If no relief after 1 dose, call 911. 25 tablet 3    vitamin B-1 (THIAMINE) 100 MG tablet Take 1 tablet by mouth daily      OnabotulinumtoxinA (BOTOX IJ) Inject as directed Patient is taking for migraines      calcium carbonate (OSCAL) 500 MG TABS tablet Take 1 tablet by mouth daily      Galcanezumab-gnlm 120 MG/ML SOAJ Inject into the skin      Acetaminophen (TYLENOL EXTRA STRENGTH PO) Take by mouth Indications: prn      POTASSIUM GLUCONATE PO Take by mouth daily       Current Facility-Administered Medications   Medication Dose Route Frequency Provider Last Rate Last Admin    betamethasone acetate-betamethasone sodium phosphate (CELESTONE) injection 6 mg  6 mg Intra-artICUlar Once Norman Mings, DPM         Allergies   Allergen Reactions    Cupric Oxide     Chrome Alum     Cobalt     Copper-Containing Compounds     Nickel     Other        Health Maintenance   Topic Date Due    COVID-19 Vaccine (5 - Booster for Pfizer series) 09/20/2022    Flu vaccine (1) 08/01/2023    Lipids  09/16/2023    Depression Screen  07/26/2024    Annual Wellness Visit (AWV)  07/26/2024    Colorectal Cancer Screen  05/28/2029    DTaP/Tdap/Td vaccine (2 - Td or Tdap) 10/01/2030    Shingles vaccine  Completed    Pneumococcal 65+ years Vaccine  Completed    AAA screen  Completed    Hepatitis C screen  Completed    Hepatitis A vaccine  Aged Out    Hib vaccine  Aged Out

## 2023-08-23 LAB
APPEARANCE: CLEAR
BILIRUBIN: NEGATIVE
COLOR: YELLOW
GLUCOSE BLD-MCNC: NEGATIVE MG/DL
KETONES, URINE: NEGATIVE
LEUKOCYTE ESTERASE, URINE: NEGATIVE
NITRITE, URINE: NEGATIVE
OCCULT BLOOD,URINE: NEGATIVE
PH: 7 (ref 5–9)
PROTEIN, URINE: NEGATIVE
SP GRAVITY MISCELLANEOUS: 1.02 (ref 1–1.03)
UROBILINOGEN, URINE: NORMAL

## 2023-08-28 DIAGNOSIS — K20.80 CORROSIVE ESOPHAGITIS: ICD-10-CM

## 2023-08-29 RX ORDER — OMEPRAZOLE 20 MG/1
CAPSULE, DELAYED RELEASE ORAL
Qty: 90 CAPSULE | Refills: 3 | Status: SHIPPED | OUTPATIENT
Start: 2023-08-29

## 2023-08-29 RX ORDER — CLOPIDOGREL BISULFATE 75 MG/1
TABLET ORAL
Qty: 90 TABLET | Refills: 3 | Status: SHIPPED | OUTPATIENT
Start: 2023-08-29

## 2023-08-29 RX ORDER — TOPIRAMATE 50 MG/1
TABLET, FILM COATED ORAL
Qty: 180 TABLET | Refills: 3 | Status: SHIPPED | OUTPATIENT
Start: 2023-08-29

## 2023-08-29 RX ORDER — TRAZODONE HYDROCHLORIDE 50 MG/1
TABLET ORAL
Qty: 90 TABLET | Refills: 3 | Status: SHIPPED | OUTPATIENT
Start: 2023-08-29

## 2023-08-29 NOTE — TELEPHONE ENCOUNTER
LAST VISIT:   8/10/2023     Future Appointments   Date Time Provider [address]   9/7/2023  8:30 AM YVETTE Tracy

## 2023-09-01 LAB
DATE OF COLLECTION: NORMAL
HEMOCCULT SP1 STL QL: NEGATIVE
OCCULT BLOOD 2: NEGATIVE
OCCULT BLOOD 3: NEGATIVE

## 2023-09-07 ENCOUNTER — OFFICE VISIT (OUTPATIENT)
Dept: PRIMARY CARE CLINIC | Age: 73
End: 2023-09-07
Payer: COMMERCIAL

## 2023-09-07 VITALS
OXYGEN SATURATION: 95 % | DIASTOLIC BLOOD PRESSURE: 54 MMHG | SYSTOLIC BLOOD PRESSURE: 100 MMHG | BODY MASS INDEX: 36.91 KG/M2 | HEART RATE: 67 BPM | WEIGHT: 249.2 LBS | HEIGHT: 69 IN

## 2023-09-07 DIAGNOSIS — L24.9 IRRITANT CONTACT DERMATITIS, UNSPECIFIED TRIGGER: ICD-10-CM

## 2023-09-07 DIAGNOSIS — K63.5 POLYP OF COLON, UNSPECIFIED PART OF COLON, UNSPECIFIED TYPE: ICD-10-CM

## 2023-09-07 DIAGNOSIS — B37.0 THRUSH: ICD-10-CM

## 2023-09-07 DIAGNOSIS — J44.9 CHRONIC OBSTRUCTIVE PULMONARY DISEASE, UNSPECIFIED COPD TYPE (HCC): ICD-10-CM

## 2023-09-07 DIAGNOSIS — M47.816 LUMBAR SPONDYLOSIS: ICD-10-CM

## 2023-09-07 DIAGNOSIS — D64.9 LOW HEMOGLOBIN: Primary | ICD-10-CM

## 2023-09-07 DIAGNOSIS — R10.11 RIGHT UPPER QUADRANT ABDOMINAL PAIN: ICD-10-CM

## 2023-09-07 DIAGNOSIS — Z23 NEED FOR VACCINATION: ICD-10-CM

## 2023-09-07 DIAGNOSIS — M51.36 DISC DEGENERATION, LUMBAR: ICD-10-CM

## 2023-09-07 DIAGNOSIS — R41.3 MEMORY LOSS: ICD-10-CM

## 2023-09-07 PROCEDURE — 90694 VACC AIIV4 NO PRSRV 0.5ML IM: CPT | Performed by: PHYSICIAN ASSISTANT

## 2023-09-07 PROCEDURE — G0008 ADMIN INFLUENZA VIRUS VAC: HCPCS | Performed by: PHYSICIAN ASSISTANT

## 2023-09-07 PROCEDURE — 99215 OFFICE O/P EST HI 40 MIN: CPT | Performed by: PHYSICIAN ASSISTANT

## 2023-09-07 PROCEDURE — 3074F SYST BP LT 130 MM HG: CPT | Performed by: PHYSICIAN ASSISTANT

## 2023-09-07 PROCEDURE — 3078F DIAST BP <80 MM HG: CPT | Performed by: PHYSICIAN ASSISTANT

## 2023-09-07 PROCEDURE — 1123F ACP DISCUSS/DSCN MKR DOCD: CPT | Performed by: PHYSICIAN ASSISTANT

## 2023-09-07 RX ORDER — PILOCARPINE HYDROCHLORIDE 5 MG/1
5 TABLET, FILM COATED ORAL 3 TIMES DAILY
COMMUNITY
Start: 2023-08-31

## 2023-09-07 RX ORDER — TRIAMCINOLONE ACETONIDE 0.25 MG/G
CREAM TOPICAL
Qty: 30 G | Refills: 0 | Status: SHIPPED | OUTPATIENT
Start: 2023-09-07

## 2023-09-07 RX ORDER — CLOTRIMAZOLE 10 MG/1
10 LOZENGE ORAL; TOPICAL
Qty: 50 TABLET | Refills: 0 | Status: SHIPPED | OUTPATIENT
Start: 2023-09-07 | End: 2023-09-17

## 2023-09-07 RX ORDER — TRAMADOL HYDROCHLORIDE 50 MG/1
50 TABLET ORAL EVERY 6 HOURS PRN
Qty: 28 TABLET | Refills: 0 | Status: SHIPPED | OUTPATIENT
Start: 2023-09-07 | End: 2023-09-14

## 2023-09-07 RX ORDER — DONEPEZIL HYDROCHLORIDE 5 MG/1
5 TABLET, FILM COATED ORAL NIGHTLY
Qty: 30 TABLET | Refills: 3 | Status: SHIPPED | OUTPATIENT
Start: 2023-09-07

## 2023-09-07 ASSESSMENT — ENCOUNTER SYMPTOMS
ABDOMINAL PAIN: 1
COUGH: 1
VOMITING: 0
NAUSEA: 0
SHORTNESS OF BREATH: 1
WHEEZING: 0

## 2023-09-07 NOTE — PROGRESS NOTES
[address] PRIMARY CARE  [address] [address]  Dept: [phone]ar Flores is a 74 y.o. male who presents today for his medical conditions/complaints as noted below. Chief Complaint   Patient presents with    Memory Loss     Pt is here for a 1x month f/u for memory loss. Pt states his memory has  not improved        HPI:     HPI  Decreasing ETOH    Low hemoglobin: colocards and urine show no blood. He still looks pale today. No bruising or bleeding. No fever or sweats, no Wloss  Reviewed last colonoscopy---due now. Reviewed last EGD earlier this year.      LDL Calculated (mg/dL)   Date Value   09/16/2022 72   09/02/2021 75   10/23/2019 83       (goal LDL is <100)   AST (U/L)   Date Value   07/26/2023 24     ALT (U/L)   Date Value   07/26/2023 30     BUN (mg/dL)   Date Value   09/16/2022 12     BP Readings from Last 3 Encounters:   09/07/23 (!) 100/54   08/10/23 116/60   07/26/23 (!) 120/50          (goal 120/80)    Past Medical History:   Diagnosis Date    Anxiety     Duodenitis     Fundic gland polyps of stomach, benign     Hyperlipidemia     Hypertension       Past Surgical History:   Procedure Laterality Date    APPENDECTOMY      BACK SURGERY      CHOLECYSTECTOMY      COLONOSCOPY  03/07/2012    tubular adenoma, hyperplastic polyp    COLONOSCOPY  05/28/2019    JOINT REPLACEMENT Bilateral     hip    OTHER SURGICAL HISTORY  08/29/2016    Spinal stimulator    SPINAL CORD STIMULATOR SURGERY  08/29/2016    UPPER GASTROINTESTINAL ENDOSCOPY  05/28/2019    UPPER GASTROINTESTINAL ENDOSCOPY  10/15/2020       Family History   Problem Relation Age of Onset    Heart Disease Father     Hypertension Father     Cancer Sister         lung cancer    Hypertension Brother     Stroke Brother     Tuberculosis Brother        Social History     Tobacco Use    Smoking status: Former     Packs/day: 2.00     Years: 35.00     Pack years: 70.00     Types: Cigarettes

## 2023-09-12 ENCOUNTER — OFFICE VISIT (OUTPATIENT)
Dept: PODIATRY | Age: 73
End: 2023-09-12
Payer: COMMERCIAL

## 2023-09-12 VITALS — HEIGHT: 69 IN | WEIGHT: 249 LBS | BODY MASS INDEX: 36.88 KG/M2

## 2023-09-12 DIAGNOSIS — M19.071 DJD (DEGENERATIVE JOINT DISEASE), ANKLE AND FOOT, RIGHT: Primary | ICD-10-CM

## 2023-09-12 DIAGNOSIS — M79.604 PAIN OF RIGHT LOWER EXTREMITY: ICD-10-CM

## 2023-09-12 DIAGNOSIS — M25.571 ACUTE RIGHT ANKLE PAIN: ICD-10-CM

## 2023-09-12 PROCEDURE — 1123F ACP DISCUSS/DSCN MKR DOCD: CPT | Performed by: PODIATRIST

## 2023-09-12 PROCEDURE — 99214 OFFICE O/P EST MOD 30 MIN: CPT | Performed by: PODIATRIST

## 2023-09-12 NOTE — PROGRESS NOTES
[address] [facility]  Dept: [phone]    RETURN PATIENT PROGRESS NOTE  Date of patient's visit: 9/12/2023  Patient's Name:  Ruiz Espinosa YOB: 1950            Patient Care Team:  Alexander Glynn PA-C as PCP - General (Physician Assistant)  Alexander Glynn PA-C as PCP - Empaneled Provider  Onelia Kirk MD as Consulting Physician (Gastroenterology)  Jose Grant MD as Consulting Physician (Family Medicine)  Roselyn Fatima MD as Anesthesiologist (Pain Management)  Emperatriz Van MD as Consulting Physician (Urology)  Tim Sandra MD as Consulting Physician (Cardiology)       Ruiz Espinosa 74 y.o. male that presents for follow-up of   Chief Complaint   Patient presents with    Results     Discuss mri results right ankle     Pt's primary care physician is Alexander Glynn PA-C last seen September 9 2023  Symptoms began several month(s) ago and are increased . Patient relates pain is Present to right ankle. Pain is rated 7 out of 10 and is described as intermittent. Treatments prior to today's visit include: previous podiatry treatment, ankle brace, mri. Currently denies F/C/N/V. Allergies   Allergen Reactions    Cupric Oxide     Chrome Alum     Cobalt     Copper-Containing Compounds     Nickel     Other        Past Medical History:   Diagnosis Date    Anxiety     Duodenitis     Fundic gland polyps of stomach, benign     Hyperlipidemia     Hypertension        Prior to Admission medications    Medication Sig Start Date End Date Taking? Authorizing Provider   pilocarpine (SALAGEN) 5 MG tablet Take 1 tablet by mouth 3 times daily 8/31/23  Yes Historical Provider, MD   donepezil (ARICEPT) 5 MG tablet Take 1 tablet by mouth nightly 9/7/23  Yes Alexander Glynn PA-C   triamcinolone (KENALOG) 0.025 % cream Apply topically 2 times daily.  9/7/23  Yes

## 2023-09-13 ENCOUNTER — TELEPHONE (OUTPATIENT)
Dept: GASTROENTEROLOGY | Age: 73
End: 2023-09-13

## 2023-09-29 ENCOUNTER — OFFICE VISIT (OUTPATIENT)
Dept: GASTROENTEROLOGY | Age: 73
End: 2023-09-29
Payer: COMMERCIAL

## 2023-09-29 VITALS
BODY MASS INDEX: 37.06 KG/M2 | HEART RATE: 65 BPM | WEIGHT: 250.2 LBS | TEMPERATURE: 97.9 F | HEIGHT: 69 IN | DIASTOLIC BLOOD PRESSURE: 64 MMHG | SYSTOLIC BLOOD PRESSURE: 107 MMHG

## 2023-09-29 DIAGNOSIS — R71.0 DROP IN HEMOGLOBIN: ICD-10-CM

## 2023-09-29 DIAGNOSIS — R10.13 EPIGASTRIC PAIN: ICD-10-CM

## 2023-09-29 DIAGNOSIS — K29.80 DUODENITIS: ICD-10-CM

## 2023-09-29 DIAGNOSIS — K31.7 GASTRIC POLYP: Primary | ICD-10-CM

## 2023-09-29 PROCEDURE — 3078F DIAST BP <80 MM HG: CPT | Performed by: NURSE PRACTITIONER

## 2023-09-29 PROCEDURE — 99214 OFFICE O/P EST MOD 30 MIN: CPT | Performed by: NURSE PRACTITIONER

## 2023-09-29 PROCEDURE — 1123F ACP DISCUSS/DSCN MKR DOCD: CPT | Performed by: NURSE PRACTITIONER

## 2023-09-29 PROCEDURE — 3074F SYST BP LT 130 MM HG: CPT | Performed by: NURSE PRACTITIONER

## 2023-09-29 NOTE — PROGRESS NOTES
GI CLINIC FOLLOW UP    INTERVAL HISTORY:     Chief Complaint   Patient presents with    Discuss Labs     Patient states that he is here today to be seen due to his hemoglobin being low. Patient states that his PCP did labs and his hemoglobin came back low. Patient had stool test done and everything came back normal.     Abdominal Pain     Patient states that he has a pain in his abdomen that he notices more when lifting something or pushing something. Patient states that even when sitting there is pressure and discomfort. Irritable Bowel Syndrome     Patient states that he has IBS and was prescribed Linzess, however, when he takes it he gets fast bowel movements. HISTORY OF PRESENT ILLNESS:     Patient being seen for follow-up. Patient has hx of IBS, abdominal pain, epigastric pain. Had blood work in July with a pronounced drop in hgb. Hg was 10.5 at that time. His hgb 10 months prior was around 14. Denies any overt GI bleeding. He had stool for occult blood x 3 that were all negative. He has pinpoint epigastric pain. He had hx of duodenitis. Hx of duodenal ulcer several years ago. His most recent gastrin was 116  On PPI    His IBS appears to be well controlled. Takes Bentyl PRN. Having formed, daily bowel movements. Past Medical,Family, and Social History reviewed and does contribute to the patient presentingcondition. Patient's PMH/PSH,SH,PSYCH Hx, MEDs, ALLERGIES, and ROS were all reviewed and updated in the appropriate sections.     PAST MEDICAL HISTORY:  Past Medical History:   Diagnosis Date    Anxiety     Duodenitis     Fundic gland polyps of stomach, benign     Hyperlipidemia     Hypertension        Past Surgical History:   Procedure Laterality Date    APPENDECTOMY      BACK SURGERY      CHOLECYSTECTOMY      COLONOSCOPY  03/07/2012    tubular adenoma, hyperplastic polyp    COLONOSCOPY  05/28/2019    JOINT REPLACEMENT Bilateral     hip    OTHER SURGICAL HISTORY

## 2023-10-03 LAB
ABSOLUTE BASO #: 0.04 K/UL (ref 0–0.2)
ABSOLUTE EOS #: 0.3 K/UL (ref 0–0.5)
ABSOLUTE LYMPH #: 1.47 K/UL (ref 1–4)
ABSOLUTE MONO #: 0.81 K/UL (ref 0.2–1)
ABSOLUTE NEUT #: 4.66 K/UL (ref 1.5–7.5)
BASOPHILS RELATIVE PERCENT: 0.5 %
EOSINOPHILS RELATIVE PERCENT: 4.1 %
HCT VFR BLD CALC: 35.9 % (ref 40–51)
HEMOGLOBIN: 11 G/DL (ref 13.5–17)
LYMPHOCYTE %: 20.1 %
MCH RBC QN AUTO: 24.1 PG (ref 25–33)
MCHC RBC AUTO-ENTMCNC: 30.6 G/DL (ref 31–36)
MCV RBC AUTO: 78.7 FL (ref 80–99)
MONOCYTES # BLD: 11.1 %
NEUTROPHILS RELATIVE PERCENT: 63.8 %
PDW BLD-RTO: 17.2 % (ref 11.5–15)
PLATELETS: 226 K/UL (ref 130–400)
PMV BLD AUTO: 10.9 FL (ref 9.3–13)
RBC: 4.56 M/UL (ref 4.5–6.1)
WBC: 7.3 K/UL (ref 3.5–11)

## 2023-10-12 ENCOUNTER — OFFICE VISIT (OUTPATIENT)
Dept: PRIMARY CARE CLINIC | Age: 73
End: 2023-10-12
Payer: COMMERCIAL

## 2023-10-12 VITALS
BODY MASS INDEX: 37.18 KG/M2 | HEART RATE: 68 BPM | DIASTOLIC BLOOD PRESSURE: 60 MMHG | SYSTOLIC BLOOD PRESSURE: 100 MMHG | WEIGHT: 251 LBS | HEIGHT: 69 IN | OXYGEN SATURATION: 96 %

## 2023-10-12 DIAGNOSIS — M47.816 LUMBAR SPONDYLOSIS: ICD-10-CM

## 2023-10-12 DIAGNOSIS — R53.82 CHRONIC FATIGUE: Primary | ICD-10-CM

## 2023-10-12 DIAGNOSIS — G47.19 EXCESSIVE DAYTIME SLEEPINESS: ICD-10-CM

## 2023-10-12 DIAGNOSIS — R41.3 MEMORY LOSS: ICD-10-CM

## 2023-10-12 DIAGNOSIS — M54.10 RADICULOPATHY OF LEG: ICD-10-CM

## 2023-10-12 DIAGNOSIS — M51.36 DISC DEGENERATION, LUMBAR: ICD-10-CM

## 2023-10-12 PROCEDURE — 1123F ACP DISCUSS/DSCN MKR DOCD: CPT | Performed by: PHYSICIAN ASSISTANT

## 2023-10-12 PROCEDURE — 3074F SYST BP LT 130 MM HG: CPT | Performed by: PHYSICIAN ASSISTANT

## 2023-10-12 PROCEDURE — 3078F DIAST BP <80 MM HG: CPT | Performed by: PHYSICIAN ASSISTANT

## 2023-10-12 PROCEDURE — 99214 OFFICE O/P EST MOD 30 MIN: CPT | Performed by: PHYSICIAN ASSISTANT

## 2023-10-12 ASSESSMENT — ENCOUNTER SYMPTOMS
SHORTNESS OF BREATH: 0
BACK PAIN: 1
COUGH: 0

## 2023-10-12 NOTE — PROGRESS NOTES
[address] PRIMARY CARE  [address]  [facility] [address]  Dept: [address] Narciso Flores is a 74 y.o. male who presents today for his medical conditions/complaints as noted below. Chief Complaint   Patient presents with    Memory Loss     Pt is here for a flu for his memory loss and low hemoglobin,      Hypoglycemia    Pain     Pt states Tramadol is helping ,    Rash     Pt states his rash on his back is better     Other     Pt states he did not get a colonoscopy        HPI:     HPI  Aricept is tolerable but he is not sure it is helping. Saw GI for low hemoglobin and having an EGD    Tramadol is helping his pain. Pain level is 3/10.  Will be seeing PM     Rash is better    LDL Calculated (mg/dL)   Date Value   09/16/2022 72   09/02/2021 75   10/23/2019 83       (goal LDL is <100)   AST (U/L)   Date Value   07/26/2023 24     ALT (U/L)   Date Value   07/26/2023 30     BUN (mg/dL)   Date Value   09/16/2022 12     BP Readings from Last 3 Encounters:   10/12/23 100/60   09/29/23 107/64   09/07/23 (!) 100/54          (goal 120/80)    Past Medical History:   Diagnosis Date    Anxiety     Duodenitis     Fundic gland polyps of stomach, benign     Hyperlipidemia     Hypertension       Past Surgical History:   Procedure Laterality Date    APPENDECTOMY      BACK SURGERY      CHOLECYSTECTOMY      COLONOSCOPY  03/07/2012    tubular adenoma, hyperplastic polyp    COLONOSCOPY  05/28/2019    JOINT REPLACEMENT Bilateral     hip    OTHER SURGICAL HISTORY  08/29/2016    Spinal stimulator    SPINAL CORD STIMULATOR SURGERY  08/29/2016    UPPER GASTROINTESTINAL ENDOSCOPY  05/28/2019    UPPER GASTROINTESTINAL ENDOSCOPY  10/15/2020       Family History   Problem Relation Age of Onset    Heart Disease Father     Hypertension Father     Cancer Sister         lung cancer    Hypertension Brother     Stroke Brother     Tuberculosis Brother        Social History     Tobacco Use

## 2023-11-15 ENCOUNTER — TELEPHONE (OUTPATIENT)
Dept: GASTROENTEROLOGY | Age: 73
End: 2023-11-15

## 2023-11-15 NOTE — TELEPHONE ENCOUNTER
Patient's wife called in wanting to know the patient's hemoglobin results from yesterday. Yesterday it was at 10.0 ( results in care everywhere from [facility]). Patient's wife wanted to know if they should be concerned since its dropping from last month at 11.0? Please adv      Thank you!     Brandon Pederson

## 2023-11-15 NOTE — TELEPHONE ENCOUNTER
Writer called and left message for patient to call back and make a follow up appt from his egd on the 14 of nov.  At [facility] with dr Derek Andre 2-3 weeks

## 2023-11-16 NOTE — TELEPHONE ENCOUNTER
Patient's wife called back requesting the lab order be faxed to [facility].    Lab orders faxed to [phone]      Thank you!     Tasha Sheppard

## 2023-11-16 NOTE — TELEPHONE ENCOUNTER
Writer called and LVM for pt's wife with response from [facility]. CBC ordered for another 4 weeks.       Thank you,    Daniel Carrillo

## 2023-12-01 DIAGNOSIS — I10 ESSENTIAL HYPERTENSION: ICD-10-CM

## 2023-12-04 RX ORDER — ALLOPURINOL 300 MG/1
TABLET ORAL
Qty: 90 TABLET | Refills: 3 | Status: SHIPPED | OUTPATIENT
Start: 2023-12-04

## 2023-12-04 RX ORDER — CARVEDILOL 6.25 MG/1
TABLET ORAL
Qty: 180 TABLET | Refills: 1 | Status: SHIPPED | OUTPATIENT
Start: 2023-12-04

## 2023-12-04 NOTE — TELEPHONE ENCOUNTER
LAST VISIT:   10/12/2023     Future Appointments   Date Time Provider Department Center   1/15/2024  9:00 AM [facility] Hammer [facility]

## 2023-12-12 ENCOUNTER — TELEPHONE (OUTPATIENT)
Dept: GASTROENTEROLOGY | Age: 73
End: 2023-12-12

## 2023-12-12 DIAGNOSIS — R71.0 DROP IN HEMOGLOBIN: Primary | ICD-10-CM

## 2023-12-12 NOTE — TELEPHONE ENCOUNTER
Writer spoke to wife, stated Dr. Carlos Mccarty recommended pt to have labs for hemoglobin ordered and done again within 4 wks. Wife would like labs to go to pathology lab in [facility]. Please contact pt wife if needed.

## 2023-12-29 LAB
ABSOLUTE BASO #: 0.04 K/UL (ref 0–0.2)
ABSOLUTE EOS #: 0.28 K/UL (ref 0–0.5)
ABSOLUTE LYMPH #: 1.43 K/UL (ref 1–4)
ABSOLUTE MONO #: 0.7 K/UL (ref 0.2–1)
ABSOLUTE NEUT #: 3.69 K/UL (ref 1.5–7.5)
BASOPHILS RELATIVE PERCENT: 0.6 %
EOSINOPHILS RELATIVE PERCENT: 4.5 %
HCT VFR BLD CALC: 36.4 % (ref 40–51)
HEMOGLOBIN: 11.1 G/DL (ref 13.5–17)
LYMPHOCYTE %: 23.2 %
MCH RBC QN AUTO: 23.7 PG (ref 25–33)
MCHC RBC AUTO-ENTMCNC: 30.5 G/DL (ref 31–36)
MCV RBC AUTO: 77.8 FL (ref 80–99)
MONOCYTES # BLD: 11.4 %
NEUTROPHILS RELATIVE PERCENT: 60 %
PDW BLD-RTO: 16.7 % (ref 11.5–15)
PLATELETS: 233 K/UL (ref 130–400)
PMV BLD AUTO: 11 FL (ref 9.3–13)
RBC: 4.68 M/UL (ref 4.5–6.1)
WBC: 6.2 K/UL (ref 3.5–11)

## 2024-01-12 DIAGNOSIS — R10.30 LOWER ABDOMINAL PAIN: ICD-10-CM

## 2024-01-12 RX ORDER — DICYCLOMINE HYDROCHLORIDE 10 MG/1
CAPSULE ORAL
Qty: 120 CAPSULE | Refills: 0 | Status: SHIPPED | OUTPATIENT
Start: 2024-01-12

## 2024-01-12 RX ORDER — DICYCLOMINE HYDROCHLORIDE 10 MG/1
CAPSULE ORAL
Qty: 120 CAPSULE | Refills: 0 | OUTPATIENT
Start: 2024-01-12

## 2024-01-12 NOTE — TELEPHONE ENCOUNTER
LAST VISIT:   10/12/2023     Future Appointments   Date Time Provider Department Center   1/17/2024 10:15 AM Myra Barajas APRN - NP [facility]   2/15/2024 11:00 AM Yudi Gibbs DPM [facility]       Pharmacy verified? Yes    [id number] - [place], [place] - [address] - P [phone] - F [phone]  [address]  [place] [address]  Phone: [phone] Fax: [phone]

## 2024-01-12 NOTE — TELEPHONE ENCOUNTER
LAST VISIT:   10/12/2023     Future Appointments   Date Time Provider Department Center   1/15/2024  9:00 AM Cristina Hammer PA-C [facility]   1/17/2024 10:15 AM Myra Barajas APRN - NP [facility]   2/15/2024 11:00 AM Yudi Gibbs DPM [facility]     They are trying to get the medication filled before the weather turns bad today.       Pharmacy verified? Yes    [id number] - [place], [place] - [address] - P [phone] - F [phone]  [address]  [place] [address]  Phone: [phone] Fax: [phone]

## 2024-01-17 ENCOUNTER — OFFICE VISIT (OUTPATIENT)
Dept: GASTROENTEROLOGY | Age: 74
End: 2024-01-17
Payer: COMMERCIAL

## 2024-01-17 ENCOUNTER — TELEPHONE (OUTPATIENT)
Dept: GASTROENTEROLOGY | Age: 74
End: 2024-01-17

## 2024-01-17 VITALS
WEIGHT: 252 LBS | DIASTOLIC BLOOD PRESSURE: 70 MMHG | SYSTOLIC BLOOD PRESSURE: 148 MMHG | BODY MASS INDEX: 37.33 KG/M2 | HEIGHT: 69 IN | HEART RATE: 58 BPM

## 2024-01-17 DIAGNOSIS — R71.0 DROP IN HEMOGLOBIN: Primary | ICD-10-CM

## 2024-01-17 DIAGNOSIS — R10.30 LOWER ABDOMINAL PAIN: ICD-10-CM

## 2024-01-17 PROCEDURE — 1123F ACP DISCUSS/DSCN MKR DOCD: CPT | Performed by: NURSE PRACTITIONER

## 2024-01-17 PROCEDURE — 99214 OFFICE O/P EST MOD 30 MIN: CPT | Performed by: NURSE PRACTITIONER

## 2024-01-17 PROCEDURE — 3078F DIAST BP <80 MM HG: CPT | Performed by: NURSE PRACTITIONER

## 2024-01-17 PROCEDURE — 3077F SYST BP >= 140 MM HG: CPT | Performed by: NURSE PRACTITIONER

## 2024-01-17 RX ORDER — DICYCLOMINE HYDROCHLORIDE 10 MG/1
CAPSULE ORAL
Qty: 120 CAPSULE | Refills: 2 | Status: SHIPPED | OUTPATIENT
Start: 2024-01-17

## 2024-01-17 RX ORDER — FERROUS SULFATE 325(65) MG
325 TABLET ORAL 2 TIMES DAILY
Qty: 60 TABLET | Refills: 5 | Status: SHIPPED | OUTPATIENT
Start: 2024-01-17

## 2024-01-17 NOTE — TELEPHONE ENCOUNTER
Patient seen in the office today and it was mentioned to have a video capsule done.  Stated that they were advised that we will check on insurance first.  Please follow up with the patient accordingly.  Thank you!

## 2024-01-17 NOTE — PROGRESS NOTES
WORK UP:    AFP  No results found for: \"AFP\"    Alpha 1 antitrypsin   No results found for: \"A1A\"    ANTONIO  No results found for: \"ANTONIO\"    AMA  No results found for: \"MITOAB\"    ASMA  No results found for: \"SMOOTHMUSCAB\"    PT/INR  No results for input(s): \"PROTIME\", \"INR\" in the last 72 hours.    CERULOPLASMIN  No results found for: \"CERULT\"      Cancer Markers:  CEA:  No results for input(s): \"CEA\" in the last 72 hours.  Ca 125:  No results for input(s): \"\" in the last 72 hours.  Ca 19-9:   Invalid input(s): \"\"  AFP: No results for input(s): \"AFP\" in the last 72 hours.  Lactic acid:Invalid input(s): \"LACTIC ACID\"    Radiology Review:    No results found.      Active Problems:    * No active hospital problems. *  Resolved Problems:    * No resolved hospital problems. *           DIAGNOSTIC TESTING:     No results found.         IMPRESSION: Mr. Mcnamara is a 73 y.o. male with    Diagnosis Orders   1. Drop in hemoglobin  CBC with Auto Differential      2. Lower abdominal pain  dicyclomine (BENTYL) 10 MG capsule        Egd unremarkble  Hgb improved to 11.1  MCV low  Iron low  Start ferrous sulfate BID  Bentyl PRN cramping  Check cbc next 3 weeks  If continues to drop, may require capsule endoscopy.  Has spinal stimulator        Thank you for allowing me to participate in the care of Mr. Mcnamara. For any further questions please do not hesitate to contact me.    I have reviewed and agree with the ROS entered by the MA/JOSÉ MIGUELN.         HARINDER Peterson    [facility]astroenterology  Office #: [phone]

## 2024-01-18 DIAGNOSIS — R71.0 DROP IN HEMOGLOBIN: Primary | ICD-10-CM

## 2024-01-18 NOTE — TELEPHONE ENCOUNTER
Per Myra, patient has a spinal stimulator and likely needs a referral to [facility] to have a pill cam. Would you like me to put in referral?

## 2024-01-20 DIAGNOSIS — R41.3 MEMORY LOSS: ICD-10-CM

## 2024-01-22 RX ORDER — DONEPEZIL HYDROCHLORIDE 5 MG/1
5 TABLET, FILM COATED ORAL NIGHTLY
Qty: 30 TABLET | Refills: 5 | Status: SHIPPED | OUTPATIENT
Start: 2024-01-22

## 2024-02-01 DIAGNOSIS — R41.3 MEMORY LOSS: ICD-10-CM

## 2024-02-01 RX ORDER — DONEPEZIL HYDROCHLORIDE 5 MG/1
5 TABLET, FILM COATED ORAL NIGHTLY
Qty: 30 TABLET | Refills: 5 | Status: SHIPPED | OUTPATIENT
Start: 2024-02-01

## 2024-02-01 NOTE — TELEPHONE ENCOUNTER
LAST VISIT:   10/12/2023     Future Appointments   Date Time Provider Department Center   2/15/2024 11:00 AM Yudi Gibbs DPM [facility]   3/27/2024  9:30 AM [facility]

## 2024-02-15 ENCOUNTER — OFFICE VISIT (OUTPATIENT)
Dept: PODIATRY | Age: 74
End: 2024-02-15
Payer: COMMERCIAL

## 2024-02-15 VITALS — WEIGHT: 252 LBS | HEIGHT: 69 IN | BODY MASS INDEX: 37.33 KG/M2

## 2024-02-15 DIAGNOSIS — B35.1 DERMATOPHYTOSIS OF NAIL: Primary | ICD-10-CM

## 2024-02-15 DIAGNOSIS — M79.605 BILATERAL LOWER EXTREMITY PAIN: ICD-10-CM

## 2024-02-15 DIAGNOSIS — I73.9 PVD (PERIPHERAL VASCULAR DISEASE) (HCC): ICD-10-CM

## 2024-02-15 DIAGNOSIS — M79.604 BILATERAL LOWER EXTREMITY PAIN: ICD-10-CM

## 2024-02-15 PROCEDURE — 99999 PR OFFICE/OUTPT VISIT,PROCEDURE ONLY: CPT | Performed by: PODIATRIST

## 2024-02-15 PROCEDURE — 11721 DEBRIDE NAIL 6 OR MORE: CPT | Performed by: PODIATRIST

## 2024-02-15 NOTE — PROGRESS NOTES
[facility] PHYSICIANS [facility] PODIATRY  [address] [place] [address]  Dept: [phone]     PAIN PROGRESS NOTE  Date of patient's visit: 2/15/2024  Patient's Name:  Daquan Mcnamara YOB: 1950            Patient Care Team:  Cristina Hammer PA-C as PCP - General (Physician Assistant)  Cristina Hammer PA-C as PCP - Empaneled Provider  Jey Alvarado MD as Consulting Physician (Gastroenterology)  Consuelo Palmer MD as Consulting Physician (Family Medicine)  Sriram Gan MD as Anesthesiologist (Pain Management)  Keagan Alarcon MD as Consulting Physician (Urology)  Baljinder Aviles MD as Consulting Physician (Cardiology)      Chief Complaint   Patient presents with    Nail Problem     Toenail trim    Foot Pain     Bl feet       Subjective:   This Daquan Mcnamara comes to clinic for foot and nail care.  Pt currently has complaint of thickened, painful, elongated nails that he/she cannot manage by themselves.  Pt. Relates pain to nails with shoe gear.  Pt's primary care physician is Cristina Hammer PA-C last seen October 12 2023.    Past Medical History:   Diagnosis Date    Anxiety     Duodenitis     Fundic gland polyps of stomach, benign     Hyperlipidemia     Hypertension        Allergies   Allergen Reactions    Cupric Oxide     Chrome Alum     Cobalt     Copper-Containing Compounds     Nickel     Other      Current Outpatient Medications on File Prior to Visit   Medication Sig Dispense Refill    donepezil (ARICEPT) 5 MG tablet Take 1 tablet by mouth nightly 30 tablet 5    dicyclomine (BENTYL) 10 MG capsule TAKE 1 CAPSULE BY MOUTH FOUR TIMES A DAY AS NEEDED for cramping 120 capsule 2    ferrous sulfate (IRON 325) 325 (65 Fe) MG tablet Take 1 tablet by mouth 2 times daily 60 tablet 5    pregabalin (LYRICA) 50 MG capsule TAKE 1 CAPSULE BY MOUTH 3 TIMES A DAY 90 capsule 5    carvedilol (COREG) 6.25 MG tablet

## 2024-02-20 ENCOUNTER — TELEPHONE (OUTPATIENT)
Dept: PRIMARY CARE CLINIC | Age: 74
End: 2024-02-20

## 2024-02-20 NOTE — TELEPHONE ENCOUNTER
Would like Daquan to do an at home Covid test. How many days has he had these symptoms?  Fever at all?

## 2024-02-22 ENCOUNTER — TELEPHONE (OUTPATIENT)
Dept: PRIMARY CARE CLINIC | Age: 74
End: 2024-02-22

## 2024-02-23 ENCOUNTER — TELEPHONE (OUTPATIENT)
Dept: UROLOGY | Age: 74
End: 2024-02-23

## 2024-02-23 NOTE — TELEPHONE ENCOUNTER
Charissa (patient's wife) called in-stated that they are in need of a new prescription sent to [facility] for Myrbetriq. She stated per the pharmacy, the current script has been sitting for over 6 months so it can't be filled.

## 2024-02-26 NOTE — TELEPHONE ENCOUNTER
Unfortunately patient would need to be seen in office before we can refill this. His last visit was in 2022.

## 2024-03-01 ENCOUNTER — TELEPHONE (OUTPATIENT)
Dept: GASTROENTEROLOGY | Age: 74
End: 2024-03-01

## 2024-03-01 NOTE — TELEPHONE ENCOUNTER
Patient wife called inquiring about Pill cam procedure. Please return her call to discuss [phone]      Thank you

## 2024-03-04 NOTE — TELEPHONE ENCOUNTER
Writer called patients wife's phone and got a voicemail. Explained on Voicemail that patient needs a colonoscopy first. Writer let her know the order was placed and Miroslava will be calling.

## 2024-03-05 ENCOUNTER — OFFICE VISIT (OUTPATIENT)
Dept: UROLOGY | Age: 74
End: 2024-03-05

## 2024-03-05 VITALS
HEIGHT: 69 IN | DIASTOLIC BLOOD PRESSURE: 57 MMHG | SYSTOLIC BLOOD PRESSURE: 114 MMHG | BODY MASS INDEX: 37.33 KG/M2 | WEIGHT: 252 LBS | TEMPERATURE: 96.9 F | HEART RATE: 60 BPM

## 2024-03-05 DIAGNOSIS — N39.41 URGE URINARY INCONTINENCE: ICD-10-CM

## 2024-03-05 DIAGNOSIS — Z12.5 PROSTATE CANCER SCREENING: Primary | ICD-10-CM

## 2024-03-05 DIAGNOSIS — R39.15 URINARY URGENCY: ICD-10-CM

## 2024-03-05 DIAGNOSIS — R39.14 FEELING OF INCOMPLETE BLADDER EMPTYING: ICD-10-CM

## 2024-03-05 LAB — POST VOID RESIDUAL (PVR): 20 ML

## 2024-03-05 RX ORDER — TAMSULOSIN HYDROCHLORIDE 0.4 MG/1
0.4 CAPSULE ORAL DAILY
Qty: 90 CAPSULE | Refills: 3 | Status: SHIPPED | OUTPATIENT
Start: 2024-03-05

## 2024-03-05 ASSESSMENT — ENCOUNTER SYMPTOMS
BACK PAIN: 0
CONSTIPATION: 0
SHORTNESS OF BREATH: 0
EYE PAIN: 0
COUGH: 0
EYES NEGATIVE: 1
GASTROINTESTINAL NEGATIVE: 1
NAUSEA: 0
WHEEZING: 0
RESPIRATORY NEGATIVE: 1
EYE REDNESS: 0
ABDOMINAL PAIN: 0
VOMITING: 0
DIARRHEA: 0

## 2024-03-05 NOTE — PROGRESS NOTES
[facility] PHYSICIANS [facility] UROLOGY CENTER  [address]  [facility] [address]  Dept: [phone]    [facility] Urology Office Note - Established    Patient:  Daquan Mcnamara  YOB: 1950  Date: 3/5/2024    The patient is a 73 y.o. male who presents todayfor evaluation of the following problems:   Chief Complaint   Patient presents with    Urinary Urgency     Myrbetriq refill and follow up       HPI  Patient is presenting for follow up.   He has a slow stream and is uncertain if he empties bladder.  He thought flomax was stopped, but Dr. Alarcon wanted to restart last year.   He has not been taking flomax.  He does have urgency with urge incontinence episodes 1x/week.   He takes myrbetriq for OAB, stopped anticholinergics d/t dry mouth and constipation.   He is still bothered by is urgency/ UUI.   PSA 12/9/22--2.18, no fhx prostate ca.     Summary of old records: N/A    Additional History: N/A    Procedures Today: N/A    Urinalysis today:  No results found for this visit on 03/05/24.  Last several PSA's:  No results found for: \"PSA\"  Last total testosterone:  No results found for: \"TESTOSTERONE\"         Last BUN and creatinine:  Lab Results   Component Value Date    BUN 12 09/16/2022     Lab Results   Component Value Date    CREATININE 0.66 (L) 09/16/2022       Additional Lab/Culture results: none    Imaging Reviewed during this Office Visit: none  (results were independently reviewed by physician and radiology report verified)    PAST MEDICAL, FAMILY AND SOCIAL HISTORY UPDATE:  Past Medical History:   Diagnosis Date    Anxiety     Duodenitis     Fundic gland polyps of stomach, benign     Hyperlipidemia     Hypertension      Past Surgical History:   Procedure Laterality Date    APPENDECTOMY      BACK SURGERY      CHOLECYSTECTOMY      COLONOSCOPY  03/07/2012    tubular adenoma, hyperplastic polyp    COLONOSCOPY  05/28/2019    JOINT REPLACEMENT

## 2024-03-05 NOTE — PATIENT INSTRUCTIONS
Restart flomax  Continue myrbetriq 50mg, denies need for refills.   Update PSA- can call results.   If symptoms are not better with resuming flomax, then return to clinic.

## 2024-03-06 DIAGNOSIS — K52.9 CHRONIC DIARRHEA: Primary | ICD-10-CM

## 2024-03-07 LAB
ABSOLUTE BASO #: 0.06 K/UL (ref 0–0.2)
ABSOLUTE EOS #: 0.42 K/UL (ref 0–0.5)
ABSOLUTE LYMPH #: 1.69 K/UL (ref 1–4)
ABSOLUTE MONO #: 0.73 K/UL (ref 0.2–1)
ABSOLUTE NEUT #: 4.53 K/UL (ref 1.5–7.5)
BASOPHILS RELATIVE PERCENT: 0.8 %
EOSINOPHILS RELATIVE PERCENT: 5.6 %
HCT VFR BLD CALC: 37 % (ref 40–51)
HEMOGLOBIN: 11.4 G/DL (ref 13.5–17)
LYMPHOCYTE %: 22.7 %
MCH RBC QN AUTO: 24.1 PG (ref 25–33)
MCHC RBC AUTO-ENTMCNC: 30.8 G/DL (ref 31–36)
MCV RBC AUTO: 78.1 FL (ref 80–99)
MONOCYTES # BLD: 9.8 %
NEUTROPHILS RELATIVE PERCENT: 61 %
PDW BLD-RTO: 17.5 % (ref 11.5–15)
PLATELETS: 251 K/UL (ref 130–400)
PMV BLD AUTO: 11 FL (ref 9.3–13)
PSA, ULTRASENSITIVE: 3.27 NG/ML
RBC: 4.74 M/UL (ref 4.5–6.1)
WBC: 7.4 K/UL (ref 3.5–11)

## 2024-03-08 DIAGNOSIS — F41.9 ANXIETY: ICD-10-CM

## 2024-03-08 RX ORDER — POLYETHYLENE GLYCOL 3350, SODIUM SULFATE ANHYDROUS, SODIUM BICARBONATE, SODIUM CHLORIDE, POTASSIUM CHLORIDE 236; 22.74; 6.74; 5.86; 2.97 G/4L; G/4L; G/4L; G/4L; G/4L
POWDER, FOR SOLUTION ORAL
Qty: 4000 ML | Refills: 0 | Status: SHIPPED | OUTPATIENT
Start: 2024-03-08

## 2024-03-08 NOTE — TELEPHONE ENCOUNTER
Wife LVM regarding prep and stated that the pharmacy does not have.  Looks like it is pending in EPIC from 3/6 & 3/8.  Please have doctor sign off on this and let the patient know.  Thank you!

## 2024-03-08 NOTE — TELEPHONE ENCOUNTER
Writer called in prescription to [facility] due to the orders being routed to Myra and she is not in the office. Thanks.

## 2024-03-10 DIAGNOSIS — R97.20 ELEVATED PROSTATE SPECIFIC ANTIGEN (PSA): ICD-10-CM

## 2024-03-10 DIAGNOSIS — Z12.5 PROSTATE CANCER SCREENING: Primary | ICD-10-CM

## 2024-03-11 RX ORDER — LISINOPRIL 10 MG/1
TABLET ORAL
Qty: 90 TABLET | Refills: 0 | Status: SHIPPED | OUTPATIENT
Start: 2024-03-11

## 2024-03-11 RX ORDER — CITALOPRAM 40 MG/1
TABLET ORAL
Qty: 90 TABLET | Refills: 1 | Status: SHIPPED | OUTPATIENT
Start: 2024-03-11

## 2024-03-12 DIAGNOSIS — E55.9 VITAMIN D DEFICIENCY: Primary | ICD-10-CM

## 2024-03-13 RX ORDER — POLYETHYLENE GLYCOL 3350, SODIUM SULFATE ANHYDROUS, SODIUM BICARBONATE, SODIUM CHLORIDE, POTASSIUM CHLORIDE 236; 22.74; 6.74; 5.86; 2.97 G/4L; G/4L; G/4L; G/4L; G/4L
4 POWDER, FOR SOLUTION ORAL ONCE
Qty: 4000 ML | Refills: 0 | Status: SHIPPED | OUTPATIENT
Start: 2024-03-13 | End: 2024-03-13

## 2024-03-14 ENCOUNTER — TELEPHONE (OUTPATIENT)
Dept: GASTROENTEROLOGY | Age: 74
End: 2024-03-14

## 2024-03-14 NOTE — TELEPHONE ENCOUNTER
Writer TAMEKA advising patient his procedure has been scheduled for now a 745am time on 3/20/24 with [facility] at [facility]. Writer asked if patient has any questions please contact our office.

## 2024-03-19 RX ORDER — ERGOCALCIFEROL 1.25 MG/1
CAPSULE ORAL
Qty: 24 CAPSULE | Refills: 0 | OUTPATIENT
Start: 2024-03-19

## 2024-03-27 ENCOUNTER — TELEPHONE (OUTPATIENT)
Dept: UROLOGY | Age: 74
End: 2024-03-27

## 2024-03-27 ENCOUNTER — OFFICE VISIT (OUTPATIENT)
Dept: GASTROENTEROLOGY | Age: 74
End: 2024-03-27
Payer: COMMERCIAL

## 2024-03-27 VITALS
HEIGHT: 69 IN | BODY MASS INDEX: 37.68 KG/M2 | HEART RATE: 60 BPM | WEIGHT: 254.4 LBS | DIASTOLIC BLOOD PRESSURE: 60 MMHG | SYSTOLIC BLOOD PRESSURE: 119 MMHG

## 2024-03-27 DIAGNOSIS — R71.0 DROP IN HEMOGLOBIN: Primary | ICD-10-CM

## 2024-03-27 DIAGNOSIS — R39.15 URINARY URGENCY: ICD-10-CM

## 2024-03-27 PROCEDURE — 3078F DIAST BP <80 MM HG: CPT | Performed by: NURSE PRACTITIONER

## 2024-03-27 PROCEDURE — 3074F SYST BP LT 130 MM HG: CPT | Performed by: NURSE PRACTITIONER

## 2024-03-27 PROCEDURE — 1123F ACP DISCUSS/DSCN MKR DOCD: CPT | Performed by: NURSE PRACTITIONER

## 2024-03-27 PROCEDURE — 99214 OFFICE O/P EST MOD 30 MIN: CPT | Performed by: NURSE PRACTITIONER

## 2024-03-27 NOTE — PROGRESS NOTES
results for input(s): \"NA\", \"K\", \"CL\", \"CO2\", \"BUN\", \"CREATININE\", \"GLUCOSE\", \"CALCIUM\", \"MG\" in the last 72 hours.    Invalid input(s): \"PHOS;3\"    LFTS:  No results for input(s): \"ALKPHOS\", \"ALT\", \"AST\", \"BILITOT\", \"BILIDIR\", \"LABALBU\" in the last 72 hours.    Amylase/Lipase and Ammonia:  No results for input(s): \"AMYLASE\", \"LIPASE\", \"AMMONIA\" in the last 72 hours.    Acute Hepatitis Panel:  No results found for: \"HEPBSAG\", \"HEPCAB\", \"HEPBIGM\", \"HEPAIGM\"    HCV Genotype:  No components found for: \"HEPATITISCGENOTYPE\"    HCV Quantitative:  No results found for: \"HCVQNT\"    LIVER WORK UP:    AFP  No results found for: \"AFP\"    Alpha 1 antitrypsin   No results found for: \"A1A\"    ANTONIO  No results found for: \"ANTONIO\"    AMA  No results found for: \"MITOAB\"    ASMA  No results found for: \"SMOOTHMUSCAB\"    PT/INR  No results for input(s): \"PROTIME\", \"INR\" in the last 72 hours.    CERULOPLASMIN  No results found for: \"CERULT\"      Cancer Markers:  CEA:  No results for input(s): \"CEA\" in the last 72 hours.  Ca 125:  No results for input(s): \"\" in the last 72 hours.  Ca 19-9:   Invalid input(s): \"\"  AFP: No results for input(s): \"AFP\" in the last 72 hours.  Lactic acid:Invalid input(s): \"LACTIC ACID\"    Radiology Review:    No results found.      Active Problems:    * No active hospital problems. *  Resolved Problems:    * No resolved hospital problems. *           DIAGNOSTIC TESTING:     XR HIP 2-3 VW W PELVIS LEFT    Result Date: 3/21/2024  Findings: Standing AP pelvis, AP and Frog Lateral of the left hip were obtained today in clinic, and personally reviewed by myself. The patient is status post total hip arthroplasty with expected appearance of implants. There is no evidence of failure or periprosthetic fracture. The implants are well aligned with stable appearance. There is no eccentric polyethylene wear. No significant osteolysis. Implants appear well grown into bone, calcar rounding present, he has press-fit

## 2024-03-27 NOTE — TELEPHONE ENCOUNTER
Patient came in and wanted to get samples. Writer informed patient that we currently dont have enough samples to give. Patient is requesting samples for Андрей.     Patient wants script sent to [facility] in [place].

## 2024-04-03 RX ORDER — PILOCARPINE HYDROCHLORIDE 5 MG/1
5 TABLET, FILM COATED ORAL 3 TIMES DAILY
Qty: 90 TABLET | Refills: 0 | Status: SHIPPED | OUTPATIENT
Start: 2024-04-03

## 2024-04-11 DIAGNOSIS — R20.2 PARESTHESIA OF LEFT LEG: ICD-10-CM

## 2024-04-11 DIAGNOSIS — F41.9 ANXIETY: ICD-10-CM

## 2024-04-11 DIAGNOSIS — R71.0 DROP IN HEMOGLOBIN: Primary | ICD-10-CM

## 2024-04-11 DIAGNOSIS — M79.605 LEG PAIN, LATERAL, LEFT: ICD-10-CM

## 2024-04-11 DIAGNOSIS — M51.36 DISC DEGENERATION, LUMBAR: ICD-10-CM

## 2024-04-11 RX ORDER — LORAZEPAM 0.5 MG/1
TABLET ORAL
Qty: 90 TABLET | Refills: 0 | Status: SHIPPED | OUTPATIENT
Start: 2024-04-11 | End: 2024-05-11

## 2024-04-11 RX ORDER — PREGABALIN 50 MG/1
CAPSULE ORAL
Qty: 90 CAPSULE | Refills: 0 | Status: SHIPPED | OUTPATIENT
Start: 2024-04-11 | End: 2024-10-09

## 2024-04-12 LAB
ABSOLUTE BASO #: 0.05 K/UL (ref 0–0.2)
ABSOLUTE EOS #: 0.31 K/UL (ref 0–0.5)
ABSOLUTE LYMPH #: 1.31 K/UL (ref 1–4)
ABSOLUTE MONO #: 0.62 K/UL (ref 0.2–1)
ABSOLUTE NEUT #: 3.78 K/UL (ref 1.5–7.5)
BASOPHILS RELATIVE PERCENT: 0.8 %
EOSINOPHILS RELATIVE PERCENT: 5.1 %
FOLATE: >20 UG/L
HCT VFR BLD CALC: 35.4 % (ref 40–51)
HEMOGLOBIN: 11.2 G/DL (ref 13.5–17)
IMMATURE GRANULOCYTES %: 0.3 %
IRON % SATURATION: 12 % (ref 20–50)
IRON, SERUM: 38 UG/DL (ref 59–158)
LYMPHOCYTE %: 21.5 %
MCH RBC QN AUTO: 24.6 PG (ref 25–33)
MCHC RBC AUTO-ENTMCNC: 31.6 G/DL (ref 31–36)
MCV RBC AUTO: 77.8 FL (ref 80–99)
MONOCYTES # BLD: 10.2 %
NEUTROPHILS RELATIVE PERCENT: 62.1 %
PDW BLD-RTO: 17.2 % (ref 11.5–15)
PLATELETS: 226 K/UL (ref 130–400)
PMV BLD AUTO: 10.8 FL (ref 9.3–13)
RBC: 4.55 M/UL (ref 4.5–6.1)
RETIC: 0.7 % (ref 0.8–2.4)
TOTAL IRON BINDING CAPACITY: 330 UG/DL (ref 250–450)
UNSATURATED IRON BINDING CAPACITY: 292 UG/DL (ref 112–347)
VITAMIN B-12: 823 PG/ML (ref 200–950)
WBC: 6.1 K/UL (ref 3.5–11)

## 2024-04-13 LAB — TISSUE TRANSGLUTAMINASE IGA: <1 U/ML

## 2024-05-02 ENCOUNTER — TELEPHONE (OUTPATIENT)
Dept: GASTROENTEROLOGY | Age: 74
End: 2024-05-02

## 2024-05-02 NOTE — TELEPHONE ENCOUNTER
Writer attempted to contact patient and his wife Charissa back in regards to the questions about Agustín hemoglobin. Patient and his wife were unavailable. Writer left voicemail requesting a call back to address hemoglobin concerns. Last labs on the patient were from April, there is not a recent hemoglobin level.

## 2024-05-06 ENCOUNTER — TELEPHONE (OUTPATIENT)
Dept: UROLOGY | Age: 74
End: 2024-05-06

## 2024-05-06 NOTE — TELEPHONE ENCOUNTER
Patient called in and wanted to know if she could get some samples. Writer spoke to CNP and CNP confirmed that patient could get 1 box of Myrbetriq 50mg.

## 2024-05-07 ENCOUNTER — OFFICE VISIT (OUTPATIENT)
Dept: PRIMARY CARE CLINIC | Age: 74
End: 2024-05-07
Payer: COMMERCIAL

## 2024-05-07 VITALS
DIASTOLIC BLOOD PRESSURE: 50 MMHG | OXYGEN SATURATION: 95 % | HEIGHT: 69 IN | WEIGHT: 251 LBS | BODY MASS INDEX: 37.18 KG/M2 | SYSTOLIC BLOOD PRESSURE: 90 MMHG | HEART RATE: 70 BPM

## 2024-05-07 DIAGNOSIS — E78.2 MIXED HYPERLIPIDEMIA: ICD-10-CM

## 2024-05-07 DIAGNOSIS — R53.82 CHRONIC FATIGUE: ICD-10-CM

## 2024-05-07 DIAGNOSIS — Z00.00 MEDICARE ANNUAL WELLNESS VISIT, SUBSEQUENT: Primary | ICD-10-CM

## 2024-05-07 DIAGNOSIS — I10 ESSENTIAL HYPERTENSION: ICD-10-CM

## 2024-05-07 DIAGNOSIS — G47.19 EXCESSIVE DAYTIME SLEEPINESS: ICD-10-CM

## 2024-05-07 DIAGNOSIS — I25.10 CORONARY ARTERY DISEASE INVOLVING NATIVE CORONARY ARTERY OF NATIVE HEART WITHOUT ANGINA PECTORIS: ICD-10-CM

## 2024-05-07 PROCEDURE — 3078F DIAST BP <80 MM HG: CPT | Performed by: PHYSICIAN ASSISTANT

## 2024-05-07 PROCEDURE — 3074F SYST BP LT 130 MM HG: CPT | Performed by: PHYSICIAN ASSISTANT

## 2024-05-07 PROCEDURE — 1123F ACP DISCUSS/DSCN MKR DOCD: CPT | Performed by: PHYSICIAN ASSISTANT

## 2024-05-07 PROCEDURE — G0439 PPPS, SUBSEQ VISIT: HCPCS | Performed by: PHYSICIAN ASSISTANT

## 2024-05-07 RX ORDER — FLUTICASONE FUROATE, UMECLIDINIUM BROMIDE AND VILANTEROL TRIFENATATE 100; 62.5; 25 UG/1; UG/1; UG/1
POWDER RESPIRATORY (INHALATION)
COMMUNITY
Start: 2024-04-08

## 2024-05-07 RX ORDER — LISINOPRIL 5 MG/1
5 TABLET ORAL DAILY
Qty: 90 TABLET | Refills: 1 | Status: SHIPPED | OUTPATIENT
Start: 2024-05-07

## 2024-05-07 ASSESSMENT — PATIENT HEALTH QUESTIONNAIRE - PHQ9
SUM OF ALL RESPONSES TO PHQ QUESTIONS 1-9: 17
7. TROUBLE CONCENTRATING ON THINGS, SUCH AS READING THE NEWSPAPER OR WATCHING TELEVISION: NEARLY EVERY DAY
3. TROUBLE FALLING OR STAYING ASLEEP: NEARLY EVERY DAY
4. FEELING TIRED OR HAVING LITTLE ENERGY: NEARLY EVERY DAY
9. THOUGHTS THAT YOU WOULD BE BETTER OFF DEAD, OR OF HURTING YOURSELF: NOT AT ALL
SUM OF ALL RESPONSES TO PHQ QUESTIONS 1-9: 17
1. LITTLE INTEREST OR PLEASURE IN DOING THINGS: MORE THAN HALF THE DAYS
5. POOR APPETITE OR OVEREATING: MORE THAN HALF THE DAYS
2. FEELING DOWN, DEPRESSED OR HOPELESS: MORE THAN HALF THE DAYS
10. IF YOU CHECKED OFF ANY PROBLEMS, HOW DIFFICULT HAVE THESE PROBLEMS MADE IT FOR YOU TO DO YOUR WORK, TAKE CARE OF THINGS AT HOME, OR GET ALONG WITH OTHER PEOPLE: NOT DIFFICULT AT ALL
8. MOVING OR SPEAKING SO SLOWLY THAT OTHER PEOPLE COULD HAVE NOTICED. OR THE OPPOSITE, BEING SO FIGETY OR RESTLESS THAT YOU HAVE BEEN MOVING AROUND A LOT MORE THAN USUAL: SEVERAL DAYS
SUM OF ALL RESPONSES TO PHQ QUESTIONS 1-9: 17
SUM OF ALL RESPONSES TO PHQ9 QUESTIONS 1 & 2: 4
6. FEELING BAD ABOUT YOURSELF - OR THAT YOU ARE A FAILURE OR HAVE LET YOURSELF OR YOUR FAMILY DOWN: SEVERAL DAYS
SUM OF ALL RESPONSES TO PHQ QUESTIONS 1-9: 17

## 2024-05-07 ASSESSMENT — LIFESTYLE VARIABLES
HAVE YOU OR SOMEONE ELSE BEEN INJURED AS A RESULT OF YOUR DRINKING: NO
HOW OFTEN DURING THE LAST YEAR HAVE YOU FAILED TO DO WHAT WAS NORMALLY EXPECTED FROM YOU BECAUSE OF DRINKING: NEVER
HOW OFTEN DURING THE LAST YEAR HAVE YOU NEEDED AN ALCOHOLIC DRINK FIRST THING IN THE MORNING TO GET YOURSELF GOING AFTER A NIGHT OF HEAVY DRINKING: NEVER
HOW OFTEN DO YOU HAVE A DRINK CONTAINING ALCOHOL: 4 OR MORE TIMES A WEEK
HOW OFTEN DURING THE LAST YEAR HAVE YOU BEEN UNABLE TO REMEMBER WHAT HAPPENED THE NIGHT BEFORE BECAUSE YOU HAD BEEN DRINKING: NEVER
HAS A RELATIVE, FRIEND, DOCTOR, OR ANOTHER HEALTH PROFESSIONAL EXPRESSED CONCERN ABOUT YOUR DRINKING OR SUGGESTED YOU CUT DOWN: NO
HOW MANY STANDARD DRINKS CONTAINING ALCOHOL DO YOU HAVE ON A TYPICAL DAY: 1 OR 2
HOW OFTEN DURING THE LAST YEAR HAVE YOU FOUND THAT YOU WERE NOT ABLE TO STOP DRINKING ONCE YOU HAD STARTED: NEVER
HOW OFTEN DURING THE LAST YEAR HAVE YOU HAD A FEELING OF GUILT OR REMORSE AFTER DRINKING: NEVER

## 2024-05-07 NOTE — PROGRESS NOTES
Medicare Annual Wellness Visit    Daquan Mcnamara is here for Medicare AWV (Pt is is for a AWV - pt has concerns of hemoglobin results /Pt states memory is the same /Pt is still having a lot of fatigue ) and Fatigue    Assessment & Plan   Medicare annual wellness visit, subsequent  Essential hypertension  -     Lipid Panel; Future  -     Comprehensive Metabolic Panel, Fasting; Future  -     lisinopril (PRINIVIL;ZESTRIL) 5 MG tablet; Take 1 tablet by mouth daily, Disp-90 tablet, R-1Normal  Chronic fatigue  Excessive daytime sleepiness  Coronary artery disease involving native coronary artery of native heart without angina pectoris  -     Lipid Panel; Future  -     Comprehensive Metabolic Panel, Fasting; Future  Mixed hyperlipidemia  -     Lipid Panel; Future  -     Comprehensive Metabolic Panel, Fasting; Future    Recommendations for Preventive Services Due: see orders and patient instructions/AVS.  Recommended screening schedule for the next 5-10 years is provided to the patient in written form: see Patient Instructions/AVS.     Return in about 2 months (around 7/7/2024) for HTN and sleep---1/2 hour.     Subjective   The following acute and/or chronic problems were also addressed today: Feeling fatigued and sleeping a lot  BP low and  still needs sleep study ---Decreased Lisinopril    Stop Trazadone and try to get more activity daily    REviewed GI, urology notes    Patient's complete Health Risk Assessment and screening values have been reviewed and are found in Flowsheets. The following problems were reviewed today and where indicated follow up appointments were made and/or referrals ordered.    Positive Risk Factor Screenings with Interventions:    Fall Risk:  Do you feel unsteady or are you worried about falling? : (!) yes  2 or more falls in past year?: no  Fall with injury in past year?: no     Interventions:    Going to PT and working on balance   Has seen Ortho and hip is good and Dr. Rockwell  wants to do

## 2024-05-07 NOTE — PATIENT INSTRUCTIONS
Have sleep study done    Think about switch from citalopram to duloxetine for depression and pain    Stop Trazadone    Great job and reducing alcohol and chocolate!!     Preventing Falls: Care Instructions  Injuries and health problems such as trouble walking or poor eyesight can increase your risk of falling. So can some medicines. But there are things you can do to help prevent falls. You can exercise to get stronger. You can also arrange your home to make it safer.    Talk to your doctor about the medicines you take. Ask if any of them increase the risk of falls and whether they can be changed or stopped.   Try to exercise regularly. It can help improve your strength and balance. This can help lower your risk of falling.     Practice fall safety and prevention.    Wear low-heeled shoes that fit well and give your feet good support. Talk to your doctor if you have foot problems that make this hard.  Carry a cellphone or wear a medical alert device that you can use to call for help.  Use stepladders instead of chairs to reach high objects. Don't climb if you're at risk for falls. Ask for help, if needed.  Wear the correct eyeglasses, if you need them.    Make your home safer.    Remove rugs, cords, clutter, and furniture from walkways.  Keep your house well lit. Use night-lights in hallways and bathrooms.  Install and use sturdy handrails on stairways.  Wear nonskid footwear, even inside. Don't walk barefoot or in socks without shoes.    Be safe outside.    Use handrails, curb cuts, and ramps whenever possible.  Keep your hands free by using a shoulder bag or backpack.  Try to walk in well-lit areas. Watch out for uneven ground, changes in pavement, and debris.  Be careful in the winter. Walk on the grass or gravel when sidewalks are slippery. Use de-icer on steps and walkways. Add non-slip devices to shoes.    Put grab bars and nonskid mats in your shower or tub and near the toilet. Try to use a shower chair or

## 2024-05-16 ENCOUNTER — OFFICE VISIT (OUTPATIENT)
Dept: PODIATRY | Age: 74
End: 2024-05-16
Payer: COMMERCIAL

## 2024-05-16 VITALS — BODY MASS INDEX: 37.18 KG/M2 | WEIGHT: 251 LBS | HEIGHT: 69 IN

## 2024-05-16 DIAGNOSIS — I73.9 PVD (PERIPHERAL VASCULAR DISEASE) (HCC): ICD-10-CM

## 2024-05-16 DIAGNOSIS — M79.605 BILATERAL LOWER EXTREMITY PAIN: ICD-10-CM

## 2024-05-16 DIAGNOSIS — B35.1 DERMATOPHYTOSIS OF NAIL: Primary | ICD-10-CM

## 2024-05-16 DIAGNOSIS — M79.604 BILATERAL LOWER EXTREMITY PAIN: ICD-10-CM

## 2024-05-16 PROCEDURE — 11721 DEBRIDE NAIL 6 OR MORE: CPT | Performed by: PODIATRIST

## 2024-05-16 NOTE — PROGRESS NOTES
extremity pain  58782 - MI DEBRIDEMENT OF NAILS, 6 OR MORE      3. PVD (peripheral vascular disease) ([facility])  51134 - MI DEBRIDEMENT OF NAILS, 6 OR MORE              Plan:   Pt was evaluated and examined. Patient was given personalized discharge instructions.  Nails 1-10 were debrided in length and thickness sharply with a nail nipper and  without incident. Pt will follow up in 9 weeks or sooner if any problems arise. Diagnosis was discussed with the pt and all of their questions were answered in detail. Proper foot hygiene and care was discussed with the pt. Patient to check feet daily and contact the office with any questions/problems/concerns.  Other comorbidity noted and will be managed by PCP.  Pain waiver discussed with patient and confirmed.   5/16/2024      Electronically signed by Yudi Gibbs DPM on 5/16/2024 at 10:17 AM  5/16/2024

## 2024-05-17 RX ORDER — ATORVASTATIN CALCIUM 80 MG/1
TABLET, FILM COATED ORAL
Qty: 90 TABLET | Refills: 0 | Status: SHIPPED | OUTPATIENT
Start: 2024-05-17

## 2024-05-17 RX ORDER — LINACLOTIDE 145 UG/1
145 CAPSULE, GELATIN COATED ORAL
Qty: 90 CAPSULE | Refills: 1 | Status: SHIPPED | OUTPATIENT
Start: 2024-05-17

## 2024-05-22 LAB
ALBUMIN: 4 G/DL (ref 3.5–5.2)
ALK PHOSPHATASE: 99 U/L (ref 40–125)
ALT SERPL-CCNC: 24 U/L (ref 5–50)
ANION GAP SERPL CALCULATED.3IONS-SCNC: 7 MEQ/L (ref 7–16)
AST SERPL-CCNC: 20 U/L (ref 9–50)
BILIRUB SERPL-MCNC: 0.4 MG/DL
BUN BLDV-MCNC: 16 MG/DL (ref 8–23)
CALCIUM SERPL-MCNC: 9.3 MG/DL (ref 8.5–10.5)
CHLORIDE BLD-SCNC: 106 MEQ/L (ref 95–107)
CHOLESTEROL, TOTAL: 160 MG/DL
CHOLESTEROL/HDL RATIO: 3.8 RATIO
CO2: 26 MEQ/L (ref 19–31)
CREAT SERPL-MCNC: 0.75 MG/DL (ref 0.8–1.4)
EGFR IF NONAFRICAN AMERICAN: 94 ML/MIN/1.73
GLUCOSE: 101 MG/DL (ref 70–99)
HDLC SERPL-MCNC: 42 MG/DL
LDL CHOLESTEROL: 93 MG/DL
LDL/HDL RATIO: 2.2 RATIO
POTASSIUM SERPL-SCNC: 4.6 MEQ/L (ref 3.5–5.4)
SODIUM BLD-SCNC: 139 MEQ/L (ref 133–146)
TOTAL PROTEIN: 6.6 G/DL (ref 6.1–8.3)
TRIGL SERPL-MCNC: 123 MG/DL
VLDLC SERPL CALC-MCNC: 25 MG/DL

## 2024-05-28 ENCOUNTER — TELEPHONE (OUTPATIENT)
Dept: PRIMARY CARE CLINIC | Age: 74
End: 2024-05-28

## 2024-05-28 ENCOUNTER — OFFICE VISIT (OUTPATIENT)
Dept: PRIMARY CARE CLINIC | Age: 74
End: 2024-05-28
Payer: COMMERCIAL

## 2024-05-28 VITALS
DIASTOLIC BLOOD PRESSURE: 60 MMHG | WEIGHT: 253.6 LBS | SYSTOLIC BLOOD PRESSURE: 100 MMHG | OXYGEN SATURATION: 93 % | BODY MASS INDEX: 37.56 KG/M2 | TEMPERATURE: 97.7 F | HEART RATE: 53 BPM | HEIGHT: 69 IN

## 2024-05-28 DIAGNOSIS — R13.12 OROPHARYNGEAL DYSPHAGIA: Primary | ICD-10-CM

## 2024-05-28 DIAGNOSIS — R68.2 DRY MOUTH: ICD-10-CM

## 2024-05-28 PROBLEM — M67.979 ACHILLES TENDON DISORDER: Status: ACTIVE | Noted: 2024-04-17

## 2024-05-28 PROBLEM — G95.9 DISEASE OF SPINAL CORD (HCC): Status: ACTIVE | Noted: 2024-01-19

## 2024-05-28 PROBLEM — M76.70 PERONEAL TENDINITIS: Status: ACTIVE | Noted: 2024-04-17

## 2024-05-28 PROBLEM — M76.899 TENDINITIS OF HIP: Status: ACTIVE | Noted: 2024-03-21

## 2024-05-28 PROBLEM — M76.892 HIP FLEXOR TENDINITIS, LEFT: Status: ACTIVE | Noted: 2024-03-21

## 2024-05-28 PROBLEM — M25.559 ARTHRALGIA OF HIP: Status: ACTIVE | Noted: 2024-03-06

## 2024-05-28 PROBLEM — R26.2 DIFFICULTY WALKING: Status: ACTIVE | Noted: 2024-01-19

## 2024-05-28 PROCEDURE — 3074F SYST BP LT 130 MM HG: CPT | Performed by: PHYSICIAN ASSISTANT

## 2024-05-28 PROCEDURE — 99213 OFFICE O/P EST LOW 20 MIN: CPT | Performed by: PHYSICIAN ASSISTANT

## 2024-05-28 PROCEDURE — 3078F DIAST BP <80 MM HG: CPT | Performed by: PHYSICIAN ASSISTANT

## 2024-05-28 PROCEDURE — 1123F ACP DISCUSS/DSCN MKR DOCD: CPT | Performed by: PHYSICIAN ASSISTANT

## 2024-05-28 RX ORDER — PILOCARPINE HYDROCHLORIDE 5 MG/1
5 TABLET, FILM COATED ORAL DAILY
Qty: 90 TABLET | Refills: 1 | Status: SHIPPED | OUTPATIENT
Start: 2024-05-28

## 2024-05-28 RX ORDER — PILOCARPINE HYDROCHLORIDE 5 MG/1
5 TABLET, FILM COATED ORAL 3 TIMES DAILY
Qty: 90 TABLET | Refills: 0 | Status: CANCELLED | OUTPATIENT
Start: 2024-05-28

## 2024-05-28 ASSESSMENT — ENCOUNTER SYMPTOMS
SORE THROAT: 0
EYES NEGATIVE: 1
RESPIRATORY NEGATIVE: 1
TROUBLE SWALLOWING: 0
ABDOMINAL PAIN: 0
SHORTNESS OF BREATH: 0
CHOKING: 0
COUGH: 0
VOICE CHANGE: 1
COLOR CHANGE: 0

## 2024-05-28 NOTE — TELEPHONE ENCOUNTER
Per Jacob if the prescriber is wanting a mouthwash with only lidocaine a regular pharmacy would fill. If prescriber is wanting anything else combined with it they are asking for a new script specifying such.

## 2024-05-28 NOTE — PROGRESS NOTES
[facility]  [address]  [place] [address]  Phone: [phone]  Fax: [phone]    Daquan Mcnamara is a 73 y.o. male who presents today for his medical conditions/complaintsas noted below.  Chief Complaint   Patient presents with    Mouth Lesions     Pt is here due to a lesion on throat - pt states it has been there for x2 weeks - pt states it is very painful         HPI:     Mouth Lesions   Associated symptoms include mouth sores. Pertinent negatives include no fever, no abdominal pain, no sore throat, no cough and no rash.   No remembered trauma        Current Outpatient Medications   Medication Sig Dispense Refill    Magic Mouthwash (MIRACLE MOUTHWASH) Swish and spit 5 mLs 4 times daily as needed for Irritation 120 mL 1    pilocarpine (SALAGEN) 5 MG tablet Take 1 tablet by mouth daily 90 tablet 1    atorvastatin (LIPITOR) 80 MG tablet TAKE 1 TABLET BY MOUTH EVERY DAY 90 tablet 0    linaclotide (LINZESS) 145 MCG capsule TAKE 1 CAPSULE BY MOUTH EVERY MORNING BEFORE BREAKFAST (Patient taking differently: Take 1 capsule by mouth as needed) 90 capsule 1    TRELEGY ELLIPTA 100-62.5-25 MCG/ACT AEPB inhaler inhale 1 puff by mouth in the morning      lisinopril (PRINIVIL;ZESTRIL) 5 MG tablet Take 1 tablet by mouth daily 90 tablet 1    pregabalin (LYRICA) 50 MG capsule TAKE 1 CAPSULE BY MOUTH 3 TIMES A DAY 90 capsule 0    mirabegron (MYRBETRIQ) 50 MG TB24 Take 50 mg by mouth daily 30 tablet 5    citalopram (CELEXA) 40 MG tablet TAKE 1 TABLET BY MOUTH EVERY DAY 90 tablet 1    tamsulosin (FLOMAX) 0.4 MG capsule Take 1 capsule by mouth daily 90 capsule 3    donepezil (ARICEPT) 5 MG tablet Take 1 tablet by mouth nightly 30 tablet 5    dicyclomine (BENTYL) 10 MG capsule TAKE 1 CAPSULE BY MOUTH FOUR TIMES A DAY AS NEEDED for cramping 120 capsule 2    carvedilol (COREG) 6.25 MG tablet TAKE 1 TABLET BY MOUTH 2 TIMES A  tablet 1    allopurinol (ZYLOPRIM) 300 MG tablet TAKE 1

## 2024-05-29 DIAGNOSIS — M79.605 LEG PAIN, LATERAL, LEFT: ICD-10-CM

## 2024-05-29 DIAGNOSIS — R20.2 PARESTHESIA OF LEFT LEG: ICD-10-CM

## 2024-05-29 DIAGNOSIS — M51.36 DISC DEGENERATION, LUMBAR: ICD-10-CM

## 2024-05-29 RX ORDER — PREGABALIN 50 MG/1
CAPSULE ORAL
Qty: 90 CAPSULE | Refills: 2 | Status: SHIPPED | OUTPATIENT
Start: 2024-05-29 | End: 2024-08-29

## 2024-07-01 DIAGNOSIS — R10.30 LOWER ABDOMINAL PAIN: ICD-10-CM

## 2024-07-03 DIAGNOSIS — R10.30 LOWER ABDOMINAL PAIN: ICD-10-CM

## 2024-07-03 RX ORDER — DICYCLOMINE HYDROCHLORIDE 10 MG/1
CAPSULE ORAL
Qty: 120 CAPSULE | Refills: 2 | Status: SHIPPED | OUTPATIENT
Start: 2024-07-03

## 2024-07-06 RX ORDER — DICYCLOMINE HYDROCHLORIDE 10 MG/1
CAPSULE ORAL
Qty: 120 CAPSULE | Refills: 0 | OUTPATIENT
Start: 2024-07-06

## 2024-07-08 ENCOUNTER — OFFICE VISIT (OUTPATIENT)
Dept: PRIMARY CARE CLINIC | Age: 74
End: 2024-07-08
Payer: COMMERCIAL

## 2024-07-08 VITALS
WEIGHT: 249.6 LBS | SYSTOLIC BLOOD PRESSURE: 92 MMHG | HEIGHT: 69 IN | DIASTOLIC BLOOD PRESSURE: 50 MMHG | BODY MASS INDEX: 36.97 KG/M2 | OXYGEN SATURATION: 94 % | HEART RATE: 69 BPM

## 2024-07-08 DIAGNOSIS — R05.2 SUBACUTE COUGH: ICD-10-CM

## 2024-07-08 DIAGNOSIS — R06.02 SHORTNESS OF BREATH: ICD-10-CM

## 2024-07-08 DIAGNOSIS — R13.10 ODYNOPHAGIA: ICD-10-CM

## 2024-07-08 DIAGNOSIS — R68.2 DRY MOUTH: Primary | ICD-10-CM

## 2024-07-08 DIAGNOSIS — E78.2 MIXED HYPERLIPIDEMIA: ICD-10-CM

## 2024-07-08 DIAGNOSIS — I10 ESSENTIAL HYPERTENSION: ICD-10-CM

## 2024-07-08 DIAGNOSIS — I25.10 CORONARY ARTERY DISEASE INVOLVING NATIVE CORONARY ARTERY OF NATIVE HEART WITHOUT ANGINA PECTORIS: ICD-10-CM

## 2024-07-08 DIAGNOSIS — I95.2 HYPOTENSION DUE TO DRUGS: ICD-10-CM

## 2024-07-08 DIAGNOSIS — J44.9 CHRONIC OBSTRUCTIVE PULMONARY DISEASE, UNSPECIFIED COPD TYPE (HCC): ICD-10-CM

## 2024-07-08 DIAGNOSIS — R13.12 OROPHARYNGEAL DYSPHAGIA: ICD-10-CM

## 2024-07-08 PROCEDURE — 1123F ACP DISCUSS/DSCN MKR DOCD: CPT | Performed by: PHYSICIAN ASSISTANT

## 2024-07-08 PROCEDURE — 3074F SYST BP LT 130 MM HG: CPT | Performed by: PHYSICIAN ASSISTANT

## 2024-07-08 PROCEDURE — 3078F DIAST BP <80 MM HG: CPT | Performed by: PHYSICIAN ASSISTANT

## 2024-07-08 PROCEDURE — 99214 OFFICE O/P EST MOD 30 MIN: CPT | Performed by: PHYSICIAN ASSISTANT

## 2024-07-08 RX ORDER — ATORVASTATIN CALCIUM 80 MG/1
80 TABLET, FILM COATED ORAL DAILY
Qty: 90 TABLET | Refills: 3 | Status: SHIPPED | OUTPATIENT
Start: 2024-07-08

## 2024-07-08 ASSESSMENT — ENCOUNTER SYMPTOMS
SHORTNESS OF BREATH: 1
COUGH: 1
SORE THROAT: 1

## 2024-07-31 DIAGNOSIS — R39.15 URINARY URGENCY: ICD-10-CM

## 2024-07-31 RX ORDER — MIRABEGRON 50 MG/1
50 TABLET, EXTENDED RELEASE ORAL DAILY
Qty: 14 TABLET | Refills: 0 | Status: SHIPPED | COMMUNITY
Start: 2024-07-31

## 2024-08-04 DIAGNOSIS — I10 ESSENTIAL HYPERTENSION: ICD-10-CM

## 2024-08-05 RX ORDER — CARVEDILOL 6.25 MG/1
TABLET ORAL
Qty: 180 TABLET | Refills: 1 | Status: SHIPPED | OUTPATIENT
Start: 2024-08-05

## 2024-08-26 ENCOUNTER — TELEPHONE (OUTPATIENT)
Dept: UROLOGY | Age: 74
End: 2024-08-26

## 2024-08-26 DIAGNOSIS — R39.15 URINARY URGENCY: ICD-10-CM

## 2024-08-26 DIAGNOSIS — K20.80 CORROSIVE ESOPHAGITIS: ICD-10-CM

## 2024-08-26 RX ORDER — FLUTICASONE FUROATE, UMECLIDINIUM BROMIDE AND VILANTEROL TRIFENATATE 100; 62.5; 25 UG/1; UG/1; UG/1
POWDER RESPIRATORY (INHALATION)
Qty: 1 EACH | Refills: 0 | Status: SHIPPED | COMMUNITY
Start: 2024-08-26

## 2024-08-26 RX ORDER — IPRATROPIUM BROMIDE 42 UG/1
SPRAY, METERED NASAL
Qty: 15 ML | Refills: 3 | Status: SHIPPED | OUTPATIENT
Start: 2024-08-26

## 2024-08-26 NOTE — TELEPHONE ENCOUNTER
Patients spouse is requesting a sample of Trelegy Ellipta inhaler. Pharmacy is out of stock currently. Dose confirmed with spouse.

## 2024-08-27 RX ORDER — MIRABEGRON 50 MG/1
50 TABLET, FILM COATED, EXTENDED RELEASE ORAL DAILY
Qty: 30 TABLET | Refills: 11 | Status: SHIPPED | OUTPATIENT
Start: 2024-08-27

## 2024-08-27 NOTE — TELEPHONE ENCOUNTER
Plan:     Restart flomax  Continue myrbetriq 50mg, denies need for refills.   Update PSA- can call results.   If symptoms are not better with resuming flomax, then return to clinic.      Return in about 1 year (around 3/5/2025) for yearly with PVR.

## 2024-09-07 DIAGNOSIS — R41.3 MEMORY LOSS: ICD-10-CM

## 2024-09-09 RX ORDER — DONEPEZIL HYDROCHLORIDE 5 MG/1
TABLET, FILM COATED ORAL
Qty: 90 TABLET | Refills: 1 | Status: SHIPPED | OUTPATIENT
Start: 2024-09-09

## 2024-09-15 DIAGNOSIS — F41.9 ANXIETY: ICD-10-CM

## 2024-09-16 RX ORDER — CITALOPRAM HYDROBROMIDE 40 MG/1
TABLET ORAL
Qty: 90 TABLET | Refills: 3 | Status: SHIPPED | OUTPATIENT
Start: 2024-09-16

## 2024-09-26 DIAGNOSIS — M79.605 LEG PAIN, LATERAL, LEFT: ICD-10-CM

## 2024-09-26 DIAGNOSIS — R20.2 PARESTHESIA OF LEFT LEG: ICD-10-CM

## 2024-09-26 DIAGNOSIS — M51.36 DISC DEGENERATION, LUMBAR: ICD-10-CM

## 2024-09-26 RX ORDER — PREGABALIN 50 MG/1
CAPSULE ORAL
Qty: 90 CAPSULE | Refills: 0 | Status: SHIPPED | OUTPATIENT
Start: 2024-09-26 | End: 2024-10-26

## 2024-09-27 DIAGNOSIS — R68.2 DRY MOUTH: ICD-10-CM

## 2024-09-28 RX ORDER — PILOCARPINE HYDROCHLORIDE 5 MG/1
5 TABLET, FILM COATED ORAL DAILY
Qty: 90 TABLET | Refills: 1 | Status: SHIPPED | OUTPATIENT
Start: 2024-09-28

## 2024-10-01 DIAGNOSIS — K58.1 IRRITABLE BOWEL SYNDROME WITH CONSTIPATION: Primary | ICD-10-CM

## 2024-10-01 NOTE — TELEPHONE ENCOUNTER
Charissa (spouse) states patient would like to start back on linzess for constipation. If agreed Charissa asked that patient be on the 72 mcg dose.    Pharmacy confirmed. Please advise.

## 2024-10-03 ENCOUNTER — OFFICE VISIT (OUTPATIENT)
Dept: PODIATRY | Age: 74
End: 2024-10-03
Payer: COMMERCIAL

## 2024-10-03 VITALS — HEIGHT: 69 IN | WEIGHT: 249 LBS | BODY MASS INDEX: 36.88 KG/M2

## 2024-10-03 DIAGNOSIS — B35.1 DERMATOPHYTOSIS OF NAIL: Primary | ICD-10-CM

## 2024-10-03 DIAGNOSIS — I73.9 PVD (PERIPHERAL VASCULAR DISEASE) (HCC): ICD-10-CM

## 2024-10-03 DIAGNOSIS — M79.605 BILATERAL LOWER EXTREMITY PAIN: ICD-10-CM

## 2024-10-03 DIAGNOSIS — M79.604 BILATERAL LOWER EXTREMITY PAIN: ICD-10-CM

## 2024-10-03 PROCEDURE — 11721 DEBRIDE NAIL 6 OR MORE: CPT | Performed by: PODIATRIST

## 2024-10-03 RX ORDER — CYCLOBENZAPRINE HCL 10 MG
10 TABLET ORAL 3 TIMES DAILY PRN
COMMUNITY
Start: 2024-08-09 | End: 2024-10-08

## 2024-10-03 RX ORDER — OXYCODONE AND ACETAMINOPHEN 5; 325 MG/1; MG/1
TABLET ORAL
COMMUNITY
Start: 2024-08-09 | End: 2024-10-08 | Stop reason: ALTCHOICE

## 2024-10-03 RX ORDER — METHYLPREDNISOLONE 4 MG
4 TABLET, DOSE PACK ORAL
COMMUNITY
Start: 2024-08-21 | End: 2024-10-08 | Stop reason: ALTCHOICE

## 2024-10-03 RX ORDER — LIDOCAINE HYDROCHLORIDE 20 MG/ML
SOLUTION OROPHARYNGEAL
COMMUNITY
Start: 2024-08-16 | End: 2024-10-08 | Stop reason: ALTCHOICE

## 2024-10-06 PROBLEM — E66.9 OBESITY (BMI 35.0-39.9 WITHOUT COMORBIDITY): Status: RESOLVED | Noted: 2020-07-08 | Resolved: 2024-10-06

## 2024-10-06 NOTE — PROGRESS NOTES
[facility]  [facility]  [address]  [place] [address]  Dept: [phone]    Daquan Mcnamara is a 73 y.o. male who presents today for his medical conditions/complaints as noted below.      Chief Complaint   Patient presents with    Follow-up     Patient is here to a recheck - patient is still doing to physical therapy after back surgery 07/25- back feels better - still having dry month - sore in throat is gone        HPI:     HPI  Saw ENT and note does not mention dry mouth. Continues his PPI for dyspahgia and sore throat is gone. Pilocarpine does help the dry mouth and he decreased it to bid    Hemoglobin is still lower and looks like iron deficiency    BPs have still been running low.  Cardiology does not want to decrease the Coreg. Occ dizziness, no falls     PT has been helping the weakness and back pain.      Reviewed cardiology, neurosurgery and ENT notes    No components found for: \"LDLCHOLESTEROL\", \"LDLCALC\"    (goal LDL is <100)   AST (U/L)   Date Value   05/22/2024 20     ALT (U/L)   Date Value   05/22/2024 24     BUN (mg/dL)   Date Value   05/22/2024 16     BP Readings from Last 3 Encounters:   10/08/24 (!) 88/50   07/08/24 (!) 92/50   05/28/24 100/60          (goal 120/80)    Past Medical History:   Diagnosis Date    Anxiety     Duodenitis     Fundic gland polyps of stomach, benign     Hyperlipidemia     Hypertension       Past Surgical History:   Procedure Laterality Date    APPENDECTOMY      BACK SURGERY      CHOLECYSTECTOMY      COLONOSCOPY  03/07/2012    tubular adenoma, hyperplastic polyp    COLONOSCOPY  05/28/2019    JOINT REPLACEMENT Bilateral     hip    OTHER SURGICAL HISTORY  08/29/2016    Spinal stimulator    SPINAL CORD STIMULATOR SURGERY  08/29/2016    UPPER GASTROINTESTINAL ENDOSCOPY  05/28/2019    UPPER GASTROINTESTINAL ENDOSCOPY  10/15/2020       Family History   Problem Relation Age of Onset    Heart Disease Father     Hypertension Father

## 2024-10-08 ENCOUNTER — OFFICE VISIT (OUTPATIENT)
Dept: PRIMARY CARE CLINIC | Age: 74
End: 2024-10-08
Payer: COMMERCIAL

## 2024-10-08 VITALS
HEART RATE: 70 BPM | OXYGEN SATURATION: 94 % | WEIGHT: 244 LBS | HEIGHT: 69 IN | BODY MASS INDEX: 36.14 KG/M2 | SYSTOLIC BLOOD PRESSURE: 98 MMHG | DIASTOLIC BLOOD PRESSURE: 60 MMHG

## 2024-10-08 DIAGNOSIS — I10 ESSENTIAL HYPERTENSION: Primary | ICD-10-CM

## 2024-10-08 DIAGNOSIS — E66.01 SEVERE OBESITY (BMI 35.0-39.9) WITH COMORBIDITY: ICD-10-CM

## 2024-10-08 DIAGNOSIS — J44.9 CHRONIC OBSTRUCTIVE PULMONARY DISEASE, UNSPECIFIED COPD TYPE (HCC): ICD-10-CM

## 2024-10-08 DIAGNOSIS — K58.1 IRRITABLE BOWEL SYNDROME WITH CONSTIPATION: ICD-10-CM

## 2024-10-08 DIAGNOSIS — R13.12 OROPHARYNGEAL DYSPHAGIA: ICD-10-CM

## 2024-10-08 DIAGNOSIS — I25.10 CORONARY ARTERY DISEASE INVOLVING NATIVE CORONARY ARTERY OF NATIVE HEART WITHOUT ANGINA PECTORIS: ICD-10-CM

## 2024-10-08 DIAGNOSIS — Z23 NEED FOR VACCINATION: ICD-10-CM

## 2024-10-08 DIAGNOSIS — R41.3 MEMORY LOSS: ICD-10-CM

## 2024-10-08 DIAGNOSIS — R68.2 DRY MOUTH: ICD-10-CM

## 2024-10-08 DIAGNOSIS — D50.9 IRON DEFICIENCY ANEMIA, UNSPECIFIED IRON DEFICIENCY ANEMIA TYPE: ICD-10-CM

## 2024-10-08 DIAGNOSIS — K59.04 CHRONIC IDIOPATHIC CONSTIPATION: ICD-10-CM

## 2024-10-08 PROCEDURE — 90653 IIV ADJUVANT VACCINE IM: CPT | Performed by: PHYSICIAN ASSISTANT

## 2024-10-08 PROCEDURE — 99215 OFFICE O/P EST HI 40 MIN: CPT | Performed by: PHYSICIAN ASSISTANT

## 2024-10-08 PROCEDURE — G0008 ADMIN INFLUENZA VIRUS VAC: HCPCS | Performed by: PHYSICIAN ASSISTANT

## 2024-10-08 PROCEDURE — 3074F SYST BP LT 130 MM HG: CPT | Performed by: PHYSICIAN ASSISTANT

## 2024-10-08 PROCEDURE — 1123F ACP DISCUSS/DSCN MKR DOCD: CPT | Performed by: PHYSICIAN ASSISTANT

## 2024-10-08 PROCEDURE — 3078F DIAST BP <80 MM HG: CPT | Performed by: PHYSICIAN ASSISTANT

## 2024-10-08 RX ORDER — LINACLOTIDE 72 UG/1
72 CAPSULE, GELATIN COATED ORAL
Qty: 8 CAPSULE | Refills: 0 | Status: SHIPPED | COMMUNITY
Start: 2024-10-08

## 2024-10-08 RX ORDER — CLOPIDOGREL BISULFATE 75 MG/1
75 TABLET ORAL DAILY
Qty: 90 TABLET | Refills: 3 | Status: SHIPPED | OUTPATIENT
Start: 2024-10-08

## 2024-10-08 SDOH — ECONOMIC STABILITY: INCOME INSECURITY: HOW HARD IS IT FOR YOU TO PAY FOR THE VERY BASICS LIKE FOOD, HOUSING, MEDICAL CARE, AND HEATING?: NOT HARD AT ALL

## 2024-10-08 SDOH — ECONOMIC STABILITY: FOOD INSECURITY: WITHIN THE PAST 12 MONTHS, THE FOOD YOU BOUGHT JUST DIDN'T LAST AND YOU DIDN'T HAVE MONEY TO GET MORE.: NEVER TRUE

## 2024-10-08 SDOH — ECONOMIC STABILITY: FOOD INSECURITY: WITHIN THE PAST 12 MONTHS, YOU WORRIED THAT YOUR FOOD WOULD RUN OUT BEFORE YOU GOT MONEY TO BUY MORE.: NEVER TRUE

## 2024-10-10 ENCOUNTER — TELEPHONE (OUTPATIENT)
Dept: PRIMARY CARE CLINIC | Age: 74
End: 2024-10-10

## 2024-10-10 NOTE — PROGRESS NOTES
Current Facility-Administered Medications on File Prior to Visit   Medication Dose Route Frequency Provider Last Rate Last Admin    betamethasone acetate-betamethasone sodium phosphate (CELESTONE) injection 6 mg  6 mg Intra-artICUlar Once Yudi Gibbs DPM         Review of Systems.    Review of Systems:   History obtained from chart review and the patient  General ROS: negative for - chills, fatigue, fever, night sweats or weight gain  Constitutional: Negative for chills, diaphoresis, fatigue, fever and unexpected weight change.  Musculoskeletal: Positive for arthralgias, gait problem and joint swelling.  Neurological ROS: negative for - behavioral changes, confusion, headaches or seizures. Negative for weakness and numbness.   Dermatological ROS: negative for - mole changes, rash  Cardiovascular: Negative for leg swelling.   Gastrointestinal: Negative for constipation, diarrhea, nausea and vomiting.          Objective:  Dermatologic Exam:  Skin lesion/ulceration Absent .   Skin No rashes or nodules noted..   Skin is thin, with flaky sloughing skin as well as decreased hair growth to the lower leg  Small red hemosiderin deposits seen dorsal foot   Musculoskeletal:     1st MPJ ROM decreased, Bilateral.  Muscle strength 5/5, Bilateral.  Pain present upon palpation of toenails 1-5, Bilateral. decreased medial longitudinal arch, Bilateral.  Ankle ROM decreased,Bilateral.    Dorsally contracted digits present digits 2, Bilateral.     Vascular: DP pulses 1/4 bilateral.  PT pulses 0/4 bilateral.   CFT <5 seconds, Bilateral.  Hair growth absent to the level of the digits, Bilateral.  Edema present, Bilateral.  Varicosities absent, Bilateral. Erythema absent, Bilateral    Neurological: Sensation diminshed to light touch to level of digits, Bilateral.  Protective sensation intact 6/10 sites via 5.07/10g [place]lee Monofilament, Bilateral.  negative Tinel's, Bilateral.  negative Valleix sign, Bilateral.

## 2024-10-11 LAB
BASOPHILS ABSOLUTE: 0.06 K/UL (ref 0–0.2)
BASOPHILS RELATIVE PERCENT: 0.8 % (ref 0–2)
EOSINOPHILS ABSOLUTE: 0.49 K/UL (ref 0–0.8)
EOSINOPHILS RELATIVE PERCENT: 6.3 % (ref 0–5)
FERRITIN: 11 NG/ML (ref 30–400)
HCT VFR BLD CALC: 35.7 % (ref 39–52)
HEMOGLOBIN: 10.7 G/DL (ref 13–18)
IMMATURE GRANS (ABS): 0.02 K/UL (ref 0–0.06)
IMMATURE GRANULOCYTES %: 0.3 % (ref 0–2)
IRON % SATURATION: 6 % (ref 13–45)
IRON: 18 UG/DL (ref 59–158)
LYMPHOCYTES ABSOLUTE: 1.73 K/UL (ref 0.9–5.2)
LYMPHOCYTES RELATIVE PERCENT: 22.4 % (ref 20–45)
MCH RBC QN AUTO: 24 PG (ref 26–32)
MCHC RBC AUTO-ENTMCNC: 30 G/DL (ref 32–35)
MCV RBC AUTO: 80 FL (ref 75–100)
MONOCYTES ABSOLUTE: 0.76 K/UL (ref 0.1–1)
MONOCYTES RELATIVE PERCENT: 9.8 % (ref 0–13)
NEUTROPHILS ABSOLUTE: 4.66 K/UL (ref 1.9–8)
NEUTROPHILS RELATIVE PERCENT: 60.4 % (ref 45–75)
PDW BLD-RTO: 15.7 % (ref 11.2–14.8)
PLATELET # BLD: 268 THOUS/CMM (ref 140–440)
RBC # BLD: 4.45 MILL/CMM (ref 4.4–6.1)
TOTAL IRON BINDING CAPACITY: 324 UG/DL (ref 250–450)
UNSATURATED IRON BINDING CAPACITY: 306 UG/DL (ref 112–347)
WBC # BLD: 7.7 THDS/CMM (ref 3.6–11)

## 2024-10-11 NOTE — TELEPHONE ENCOUNTER
Infusion orders faxed to [facility] with demographics, labs , and office note

## 2024-10-11 NOTE — TELEPHONE ENCOUNTER
Spoke to wife - they did lab your yesterday at [facility] - I told her we will wait until that comes in and go from there

## 2024-10-21 ENCOUNTER — TELEPHONE (OUTPATIENT)
Dept: PRIMARY CARE CLINIC | Age: 74
End: 2024-10-21

## 2024-10-21 DIAGNOSIS — R68.2 DRY MOUTH: ICD-10-CM

## 2024-10-21 RX ORDER — PILOCARPINE HYDROCHLORIDE 5 MG/1
5 TABLET, FILM COATED ORAL 3 TIMES DAILY
Qty: 90 TABLET | Refills: 5 | Status: SHIPPED | OUTPATIENT
Start: 2024-10-21

## 2024-10-21 NOTE — TELEPHONE ENCOUNTER
Wife calling states pilocarpine is supposed to be TID but was sent in as once daily. Patient has been taking it three times daily and is out. Please clarify dose and send in a refill

## 2024-10-28 RX ORDER — TOPIRAMATE 50 MG/1
TABLET, FILM COATED ORAL
Qty: 180 TABLET | Refills: 3 | Status: SHIPPED | OUTPATIENT
Start: 2024-10-28

## 2024-10-31 DIAGNOSIS — R20.2 PARESTHESIA OF LEFT LEG: ICD-10-CM

## 2024-10-31 DIAGNOSIS — M51.369 DISC DEGENERATION, LUMBAR: ICD-10-CM

## 2024-10-31 DIAGNOSIS — M79.605 LEG PAIN, LATERAL, LEFT: ICD-10-CM

## 2024-10-31 RX ORDER — PREGABALIN 50 MG/1
CAPSULE ORAL
Qty: 90 CAPSULE | Refills: 0 | Status: SHIPPED | OUTPATIENT
Start: 2024-10-31 | End: 2024-11-30

## 2024-11-05 ENCOUNTER — TELEPHONE (OUTPATIENT)
Dept: PRIMARY CARE CLINIC | Age: 74
End: 2024-11-05

## 2024-11-05 NOTE — TELEPHONE ENCOUNTER
Valeria with [facility] calling. States the venofer rx sig, order and qty does not match up and also that 500 mg is a really high dose and wanted to make sure that is what you wanted? She states typically they would give 200 mg more frequently. Please advise    Infusion center   phone [phone]   fax [phone]

## 2024-11-14 ENCOUNTER — TELEPHONE (OUTPATIENT)
Dept: PRIMARY CARE CLINIC | Age: 74
End: 2024-11-14

## 2024-11-14 NOTE — TELEPHONE ENCOUNTER
Daquan his iron infusion scheduled for Friday, 11/15/24. 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 He has metals in his hip replacements and also metals with his back fusions. His reactions were rashes. Just wanted to make sure before we went on Friday. Thanks again.     Charissa called the [facility] infusion center about this who did not think it would be a problem but they wanted her to reach out to you to make sure.  Call Charissa at

## 2024-12-17 DIAGNOSIS — M51.369 DISC DEGENERATION, LUMBAR: ICD-10-CM

## 2024-12-17 DIAGNOSIS — R20.2 PARESTHESIA OF LEFT LEG: ICD-10-CM

## 2024-12-17 DIAGNOSIS — M79.605 LEG PAIN, LATERAL, LEFT: ICD-10-CM

## 2024-12-18 RX ORDER — PREGABALIN 50 MG/1
CAPSULE ORAL
Qty: 90 CAPSULE | Refills: 0 | Status: SHIPPED | OUTPATIENT
Start: 2024-12-18 | End: 2025-01-17

## 2024-12-18 RX ORDER — ALLOPURINOL 300 MG/1
TABLET ORAL
Qty: 90 TABLET | Refills: 0 | Status: SHIPPED | OUTPATIENT
Start: 2024-12-18

## 2025-01-07 ASSESSMENT — ENCOUNTER SYMPTOMS
SHORTNESS OF BREATH: 0
RHINORRHEA: 0
NAUSEA: 0
EYE DISCHARGE: 0
DIARRHEA: 0
VOMITING: 0
ABDOMINAL PAIN: 0
COUGH: 0
EYE REDNESS: 0
SORE THROAT: 0
WHEEZING: 0

## 2025-01-08 ENCOUNTER — OFFICE VISIT (OUTPATIENT)
Dept: PRIMARY CARE CLINIC | Age: 75
End: 2025-01-08

## 2025-01-08 VITALS
BODY MASS INDEX: 37.92 KG/M2 | HEART RATE: 62 BPM | OXYGEN SATURATION: 100 % | DIASTOLIC BLOOD PRESSURE: 92 MMHG | HEIGHT: 69 IN | SYSTOLIC BLOOD PRESSURE: 136 MMHG | WEIGHT: 256 LBS

## 2025-01-08 DIAGNOSIS — M25.552 LEFT HIP PAIN: ICD-10-CM

## 2025-01-08 DIAGNOSIS — D48.5 NEOPLASM OF UNCERTAIN BEHAVIOR OF SKIN: ICD-10-CM

## 2025-01-08 DIAGNOSIS — D50.9 IRON DEFICIENCY ANEMIA, UNSPECIFIED IRON DEFICIENCY ANEMIA TYPE: ICD-10-CM

## 2025-01-08 DIAGNOSIS — K59.04 CHRONIC IDIOPATHIC CONSTIPATION: ICD-10-CM

## 2025-01-08 DIAGNOSIS — G47.19 EXCESSIVE DAYTIME SLEEPINESS: ICD-10-CM

## 2025-01-08 DIAGNOSIS — L57.0 ACTINIC KERATOSIS: ICD-10-CM

## 2025-01-08 DIAGNOSIS — I10 ESSENTIAL HYPERTENSION: Primary | ICD-10-CM

## 2025-01-08 LAB
BASOPHILS ABSOLUTE: 0.07 K/UL (ref 0–0.2)
BASOPHILS RELATIVE PERCENT: 1 % (ref 0–2)
EOSINOPHILS ABSOLUTE: 0.43 K/UL (ref 0–0.44)
EOSINOPHILS RELATIVE PERCENT: 6 % (ref 1–4)
FERRITIN: 31 NG/ML
HCT VFR BLD CALC: 42 % (ref 40.7–50.3)
HEMOGLOBIN: 12.5 G/DL (ref 13–17)
IMMATURE GRANULOCYTES %: 0 %
IMMATURE GRANULOCYTES ABSOLUTE: 0 K/UL (ref 0–0.3)
IRON % SATURATION: 19 % (ref 20–55)
IRON: 53 UG/DL (ref 61–157)
LYMPHOCYTES ABSOLUTE: 1.56 K/UL (ref 1.1–3.7)
LYMPHOCYTES RELATIVE PERCENT: 22 % (ref 24–43)
MCH RBC QN AUTO: 25.3 PG (ref 25.2–33.5)
MCHC RBC AUTO-ENTMCNC: 29.8 G/DL (ref 28.4–34.8)
MCV RBC AUTO: 84.8 FL (ref 82.6–102.9)
MONOCYTES ABSOLUTE: 0.71 K/UL (ref 0.1–1.2)
MONOCYTES RELATIVE PERCENT: 10 % (ref 3–12)
MORPHOLOGY: ABNORMAL
NEUTROPHILS ABSOLUTE: 4.33 K/UL (ref 1.5–8.1)
NEUTROPHILS RELATIVE PERCENT: 61 % (ref 36–65)
NRBC AUTOMATED: 0 PER 100 WBC
PDW BLD-RTO: 24.4 % (ref 11.8–14.4)
PLATELET # BLD: 220 K/UL (ref 138–453)
PMV BLD AUTO: 11.3 FL (ref 8.1–13.5)
RBC # BLD: 4.95 M/UL (ref 4.21–5.77)
TOTAL IRON BINDING CAPACITY: 281 UG/DL (ref 250–450)
UNSATURATED IRON BINDING CAPACITY: 228 UG/DL (ref 112–347)
WBC # BLD: 7.1 K/UL (ref 3.5–11.3)

## 2025-01-08 RX ORDER — CYCLOBENZAPRINE HCL 10 MG
TABLET ORAL
COMMUNITY
Start: 2024-10-31

## 2025-01-10 NOTE — RESULT ENCOUNTER NOTE
Please advise that hemoglobin and iron has improved, but he could benefit from further iron infusions.

## 2025-01-13 ENCOUNTER — TELEPHONE (OUTPATIENT)
Dept: PRIMARY CARE CLINIC | Age: 75
End: 2025-01-13

## 2025-01-13 DIAGNOSIS — D50.9 IRON DEFICIENCY ANEMIA, UNSPECIFIED IRON DEFICIENCY ANEMIA TYPE: Primary | ICD-10-CM

## 2025-01-13 NOTE — TELEPHONE ENCOUNTER
----- Message from Cristina Hammer PA-C sent at 1/10/2025  4:19 PM EST -----  Please advise that hemoglobin and iron has improved, but he could benefit from further iron infusions.

## 2025-01-13 NOTE — TELEPHONE ENCOUNTER
Patient will need new orders sent to hospital - pended previous script and complete previous form for iron infusion

## 2025-01-13 NOTE — TELEPHONE ENCOUNTER
Patient (and wife) asking if they can have more orders if you think he could benefit from more iron infusions.     Please send orders to [facility].

## 2025-01-15 ENCOUNTER — HOSPITAL ENCOUNTER (OUTPATIENT)
Dept: SLEEP CENTER | Age: 75
Discharge: HOME | End: 2025-01-17
Payer: COMMERCIAL

## 2025-01-15 VITALS
RESPIRATION RATE: 16 BRPM | BODY MASS INDEX: 37.92 KG/M2 | OXYGEN SATURATION: 92 % | HEART RATE: 75 BPM | WEIGHT: 256 LBS | HEIGHT: 69 IN

## 2025-01-15 DIAGNOSIS — G47.19 EXCESSIVE DAYTIME SLEEPINESS: ICD-10-CM

## 2025-01-15 PROCEDURE — 95810 POLYSOM 6/> YRS 4/> PARAM: CPT

## 2025-01-15 ASSESSMENT — SLEEP AND FATIGUE QUESTIONNAIRES
HOW LIKELY ARE YOU TO NOD OFF OR FALL ASLEEP WHEN YOU ARE A PASSENGER IN A CAR FOR AN HOUR WITHOUT A BREAK: SLIGHT CHANCE OF DOZING
HOW LIKELY ARE YOU TO NOD OFF OR FALL ASLEEP WHILE SITTING QUIETLY AFTER LUNCH WITHOUT ALCOHOL: MODERATE CHANCE OF DOZING
HOW LIKELY ARE YOU TO NOD OFF OR FALL ASLEEP WHILE SITTING AND READING: SLIGHT CHANCE OF DOZING
HOW LIKELY ARE YOU TO NOD OFF OR FALL ASLEEP IN A CAR, WHILE STOPPED FOR A FEW MINUTES IN TRAFFIC: WOULD NEVER DOZE
HOW LIKELY ARE YOU TO NOD OFF OR FALL ASLEEP WHILE SITTING AND TALKING TO SOMEONE: WOULD NEVER DOZE
HOW LIKELY ARE YOU TO NOD OFF OR FALL ASLEEP WHILE LYING DOWN TO REST IN THE AFTERNOON WHEN CIRCUMSTANCES PERMIT: HIGH CHANCE OF DOZING
HOW LIKELY ARE YOU TO NOD OFF OR FALL ASLEEP WHILE WATCHING TV: SLIGHT CHANCE OF DOZING
ESS TOTAL SCORE: 8
HOW LIKELY ARE YOU TO NOD OFF OR FALL ASLEEP WHILE SITTING INACTIVE IN A PUBLIC PLACE: WOULD NEVER DOZE

## 2025-01-17 DIAGNOSIS — R68.2 DRY MOUTH: ICD-10-CM

## 2025-01-17 RX ORDER — FLUTICASONE FUROATE, UMECLIDINIUM BROMIDE AND VILANTEROL TRIFENATATE 100; 62.5; 25 UG/1; UG/1; UG/1
POWDER RESPIRATORY (INHALATION)
Qty: 1 EACH | Refills: 5 | Status: SHIPPED | OUTPATIENT
Start: 2025-01-17

## 2025-01-20 RX ORDER — PILOCARPINE HYDROCHLORIDE 5 MG/1
5 TABLET, FILM COATED ORAL 3 TIMES DAILY
Qty: 90 TABLET | Refills: 5 | Status: SHIPPED | OUTPATIENT
Start: 2025-01-20

## 2025-01-29 DIAGNOSIS — I10 ESSENTIAL HYPERTENSION: ICD-10-CM

## 2025-01-29 RX ORDER — CARVEDILOL 6.25 MG/1
TABLET ORAL
Qty: 180 TABLET | Refills: 1 | Status: SHIPPED | OUTPATIENT
Start: 2025-01-29

## 2025-02-04 DIAGNOSIS — M79.605 LEG PAIN, LATERAL, LEFT: ICD-10-CM

## 2025-02-04 DIAGNOSIS — M51.369 DISC DEGENERATION, LUMBAR: ICD-10-CM

## 2025-02-04 DIAGNOSIS — R20.2 PARESTHESIA OF LEFT LEG: ICD-10-CM

## 2025-02-04 RX ORDER — PREGABALIN 50 MG/1
CAPSULE ORAL
Qty: 90 CAPSULE | Refills: 0 | Status: SHIPPED | OUTPATIENT
Start: 2025-02-04 | End: 2025-03-06

## 2025-02-05 PROBLEM — G47.33 OSA (OBSTRUCTIVE SLEEP APNEA): Status: ACTIVE | Noted: 2025-02-05

## 2025-02-10 DIAGNOSIS — G47.33 OBSTRUCTIVE SLEEP APNEA SYNDROME: ICD-10-CM

## 2025-02-10 DIAGNOSIS — G47.19 EXCESSIVE DAYTIME SLEEPINESS: Primary | ICD-10-CM

## 2025-02-10 DIAGNOSIS — E66.01 SEVERE OBESITY (BMI 35.0-39.9) WITH COMORBIDITY: ICD-10-CM

## 2025-02-10 DIAGNOSIS — R53.82 CHRONIC FATIGUE: ICD-10-CM

## 2025-02-25 DIAGNOSIS — K20.80 CORROSIVE ESOPHAGITIS: ICD-10-CM

## 2025-02-26 RX ORDER — OMEPRAZOLE 20 MG/1
CAPSULE, DELAYED RELEASE ORAL
Qty: 90 CAPSULE | Refills: 0 | Status: SHIPPED | OUTPATIENT
Start: 2025-02-26

## 2025-03-03 RX ORDER — TAMSULOSIN HYDROCHLORIDE 0.4 MG/1
CAPSULE ORAL DAILY
Qty: 90 CAPSULE | Refills: 0 | Status: SHIPPED | OUTPATIENT
Start: 2025-03-03

## 2025-03-08 DIAGNOSIS — R41.3 MEMORY LOSS: ICD-10-CM

## 2025-03-10 RX ORDER — DONEPEZIL HYDROCHLORIDE 5 MG/1
TABLET, FILM COATED ORAL
Qty: 90 TABLET | Refills: 3 | Status: SHIPPED | OUTPATIENT
Start: 2025-03-10

## 2025-03-10 RX ORDER — ALLOPURINOL 300 MG/1
300 TABLET ORAL DAILY
Qty: 90 TABLET | Refills: 3 | Status: SHIPPED | OUTPATIENT
Start: 2025-03-10

## 2025-03-31 ENCOUNTER — TELEPHONE (OUTPATIENT)
Dept: PRIMARY CARE CLINIC | Age: 75
End: 2025-03-31

## 2025-03-31 DIAGNOSIS — S89.91XA INJURY OF RIGHT KNEE, INITIAL ENCOUNTER: Primary | ICD-10-CM

## 2025-03-31 NOTE — TELEPHONE ENCOUNTER
Patients wife called in stating X1 week he has had Rt side inner knee pain that suddenly came on making it hard for him to walk. Has tried Tylenol with no relief. Wife is wanting to know if you can order an XR?    -Please advise    Wants order sent to [facility]  Fax: [phone]

## 2025-04-07 DIAGNOSIS — B37.0 THRUSH: ICD-10-CM

## 2025-04-07 RX ORDER — CLOTRIMAZOLE 10 MG/1
LOZENGE ORAL
Qty: 30 TABLET | Refills: 3 | Status: SHIPPED | OUTPATIENT
Start: 2025-04-07

## 2025-04-08 ENCOUNTER — TELEPHONE (OUTPATIENT)
Dept: PRIMARY CARE CLINIC | Age: 75
End: 2025-04-08

## 2025-04-09 ENCOUNTER — RESULTS FOLLOW-UP (OUTPATIENT)
Dept: PRIMARY CARE CLINIC | Age: 75
End: 2025-04-09

## 2025-04-09 DIAGNOSIS — S89.91XA INJURY OF RIGHT KNEE, INITIAL ENCOUNTER: ICD-10-CM

## 2025-05-01 ENCOUNTER — OFFICE VISIT (OUTPATIENT)
Dept: PODIATRY | Age: 75
End: 2025-05-01
Payer: COMMERCIAL

## 2025-05-01 VITALS — HEIGHT: 69 IN | BODY MASS INDEX: 37.92 KG/M2 | WEIGHT: 256 LBS

## 2025-05-01 DIAGNOSIS — R60.0 EDEMA OF LOWER EXTREMITY: ICD-10-CM

## 2025-05-01 DIAGNOSIS — M79.604 BILATERAL LOWER EXTREMITY PAIN: ICD-10-CM

## 2025-05-01 DIAGNOSIS — I73.9 PVD (PERIPHERAL VASCULAR DISEASE): ICD-10-CM

## 2025-05-01 DIAGNOSIS — M19.071 DJD (DEGENERATIVE JOINT DISEASE), ANKLE AND FOOT, RIGHT: ICD-10-CM

## 2025-05-01 DIAGNOSIS — M79.605 BILATERAL LOWER EXTREMITY PAIN: ICD-10-CM

## 2025-05-01 DIAGNOSIS — B35.1 DERMATOPHYTOSIS OF NAIL: Primary | ICD-10-CM

## 2025-05-01 PROCEDURE — 11721 DEBRIDE NAIL 6 OR MORE: CPT | Performed by: PODIATRIST

## 2025-05-01 NOTE — PROGRESS NOTES
[facility]ODI[facility]  [address] [place] [id number]  Dept: [phone]     PAIN PROGRESS NOTE  Date of patient's visit: 5/1/2025  Patient's Name:  Daquan Mcnamara YOB: 1950            Patient Care Team:  Cristina Hammer PA-C as PCP - General (Physician Assistant)  Cristina Hammer PA-C as PCP - Empaneled Provider  Jey Alvarado MD as Consulting Physician (Gastroenterology)  Consuelo Palmer MD as Consulting Physician (Family Medicine)  Sriram Gan MD as Anesthesiologist (Pain Management)  Keagan Alarcon MD as Consulting Physician (Urology)  Baljinder Aviles MD as Consulting Physician (Cardiology)      Chief Complaint   Patient presents with    Nail Problem     Nail trim    Foot Pain     Bl feet       Subjective:   This Daquan Mcnamara comes to clinic for foot and nail care.  Pt currently has complaint of thickened, painful, elongated nails that he/she cannot manage by themselves.  Pt. Relates pain to nails with shoe gear.  Pt's primary care physician is Cristina Hammer PA-C last seen January 8 2025.  Pt has a new complaint of increased swelling to carlee LE.  Pt has tried changing shoes but it has not helped the pain    Past Medical History:   Diagnosis Date    Anxiety     Duodenitis     Fundic gland polyps of stomach, benign     Hyperlipidemia     Hypertension        Allergies   Allergen Reactions    Cupric Oxide     Chrome Alum     Cobalt     Copper-Containing Compounds     Nickel     Other      Current Outpatient Medications on File Prior to Visit   Medication Sig Dispense Refill    clotrimazole (MYCELEX) 10 MG sujey DISSOLVE 1 LOZENGE IN MOUTH 5 TIMES A DAY FOR 10 DAYS 30 tablet 3    allopurinol (ZYLOPRIM) 300 MG tablet TAKE 1 TABLET BY MOUTH EVERY DAY 90 tablet 3    donepezil (ARICEPT) 5 MG tablet TAKE 1 TABLET BY MOUTH EVERY DAY NIGHTLY 90 tablet 3    tamsulosin (FLOMAX) 0.4 MG

## 2025-05-29 DIAGNOSIS — K20.80 CORROSIVE ESOPHAGITIS: ICD-10-CM

## 2025-05-29 RX ORDER — TAMSULOSIN HYDROCHLORIDE 0.4 MG/1
CAPSULE ORAL DAILY
Qty: 90 CAPSULE | Refills: 0 | OUTPATIENT
Start: 2025-05-29

## 2025-05-29 RX ORDER — OMEPRAZOLE 20 MG/1
CAPSULE, DELAYED RELEASE ORAL DAILY
Qty: 90 CAPSULE | Refills: 0 | Status: SHIPPED | OUTPATIENT
Start: 2025-05-29

## 2025-05-29 RX ORDER — IPRATROPIUM BROMIDE 42 UG/1
SPRAY, METERED NASAL
Qty: 15 ML | Refills: 0 | Status: SHIPPED | OUTPATIENT
Start: 2025-05-29

## 2025-06-03 ENCOUNTER — TELEPHONE (OUTPATIENT)
Dept: PRIMARY CARE CLINIC | Age: 75
End: 2025-06-03

## 2025-06-03 NOTE — TELEPHONE ENCOUNTER
Patient's wife Charissa called and notified of message. No questions or concerns at this time.

## 2025-06-03 NOTE — TELEPHONE ENCOUNTER
Wife called in stating patient is sched for an MRI this Friday 6/6 & patient is claustrophobic. Is wanting to know if something can be prescribed to help relax him? States he is on Lorazepam & needs something else.    -please advise  Pharmacy confirmed

## 2025-06-03 NOTE — TELEPHONE ENCOUNTER
He can take 1mg of the Lorazepam 1/2 hour before MRI and, if needed, another 0.5mg right before or during.

## 2025-06-10 RX ORDER — TAMSULOSIN HYDROCHLORIDE 0.4 MG/1
CAPSULE ORAL DAILY
Qty: 90 CAPSULE | Refills: 0 | OUTPATIENT
Start: 2025-06-10

## 2025-06-15 NOTE — TELEPHONE ENCOUNTER
Wife calling in, state pt has the same sx as her- stuffy head, headache, sneezing. Asking for an abx. Pharm [facility]    Also, pt wife states specialist is out of myrbetriq 50 mg and was advised to ask PCP for samples. 2 sample packs of 7 tablets each placed up front for pickup. Medication labeled with instructions, lot and exp. Placed on both boxes   36.4

## 2025-06-18 ENCOUNTER — OFFICE VISIT (OUTPATIENT)
Dept: UROLOGY | Age: 75
End: 2025-06-18
Payer: COMMERCIAL

## 2025-06-18 VITALS
HEART RATE: 61 BPM | RESPIRATION RATE: 18 BRPM | BODY MASS INDEX: 39.84 KG/M2 | SYSTOLIC BLOOD PRESSURE: 102 MMHG | HEIGHT: 69 IN | DIASTOLIC BLOOD PRESSURE: 64 MMHG | WEIGHT: 269 LBS | OXYGEN SATURATION: 96 %

## 2025-06-18 DIAGNOSIS — R39.12 BENIGN PROSTATIC HYPERPLASIA WITH WEAK URINARY STREAM: Primary | ICD-10-CM

## 2025-06-18 DIAGNOSIS — R39.15 URINARY URGENCY: ICD-10-CM

## 2025-06-18 DIAGNOSIS — N40.1 BENIGN PROSTATIC HYPERPLASIA WITH WEAK URINARY STREAM: Primary | ICD-10-CM

## 2025-06-18 PROCEDURE — 3074F SYST BP LT 130 MM HG: CPT | Performed by: NURSE PRACTITIONER

## 2025-06-18 PROCEDURE — 3078F DIAST BP <80 MM HG: CPT | Performed by: NURSE PRACTITIONER

## 2025-06-18 PROCEDURE — 99214 OFFICE O/P EST MOD 30 MIN: CPT | Performed by: NURSE PRACTITIONER

## 2025-06-18 PROCEDURE — 1159F MED LIST DOCD IN RCRD: CPT | Performed by: NURSE PRACTITIONER

## 2025-06-18 PROCEDURE — 1123F ACP DISCUSS/DSCN MKR DOCD: CPT | Performed by: NURSE PRACTITIONER

## 2025-06-18 RX ORDER — PREGABALIN 100 MG/1
100 CAPSULE ORAL 2 TIMES DAILY
COMMUNITY
Start: 2025-06-13

## 2025-06-18 RX ORDER — TAMSULOSIN HYDROCHLORIDE 0.4 MG/1
0.4 CAPSULE ORAL DAILY
Qty: 90 CAPSULE | Refills: 3 | Status: SHIPPED | OUTPATIENT
Start: 2025-06-18

## 2025-06-18 RX ORDER — HYDROCODONE BITARTRATE AND ACETAMINOPHEN 5; 325 MG/1; MG/1
TABLET ORAL
COMMUNITY
Start: 2025-04-30

## 2025-06-18 RX ORDER — MIRABEGRON 50 MG/1
50 TABLET, FILM COATED, EXTENDED RELEASE ORAL DAILY
Qty: 90 TABLET | Refills: 3 | Status: SHIPPED | OUTPATIENT
Start: 2025-06-18

## 2025-06-18 ASSESSMENT — ENCOUNTER SYMPTOMS
DIARRHEA: 0
BACK PAIN: 0
ABDOMINAL PAIN: 0
VOMITING: 0
CONSTIPATION: 0
SHORTNESS OF BREATH: 0
EYE PAIN: 0
EYE REDNESS: 0
WHEEZING: 0
COUGH: 0
NAUSEA: 0

## 2025-06-18 NOTE — PROGRESS NOTES
[facility]OLOGY [facility]ENTER  [address]  [facility] [address]  Dept: [phone]    [facility] Urology Office Note - Established    Patient:  Daquan Mcnamara  YOB: 1950  Date: 6/18/2025    The patient is a 74 y.o. male who presents todayfor evaluation of the following problems:   Chief Complaint   Patient presents with    Urinary Urgency     Yearly. Dribbling, has been off flomax for a couple weeks.     Medication Refill       HPI  Patient is here today for lower urinary tract symptoms.   He continues tamsulosin 0.4mg po qd for his BPH symptoms.   He did recently run out of the medication and noticed weaker stream and post-void dribbling.  If he takes tamsulosin as prescribed, his stream is much stronger and he feels he empties well.  He does continue myrbetriq for urgency and occasional urge leaks. .  Declines additional prostate ca screening.   Overall,  symptoms are stable and he is pleased with how things are going.      Summary of old records: N/A    Additional History: N/A    Procedures Today: N/A    Urinalysis today:  No results found for this visit on 06/18/25.  Last several PSA's:  No results found for: \"PSA\"  Last total testosterone:  No results found for: \"TESTOSTERONE\"    Last BUN and creatinine:  Lab Results   Component Value Date    BUN 16 05/22/2024     Lab Results   Component Value Date    CREATININE 0.75 (L) 05/22/2024       Additional Lab/Culture results: none    Imaging Reviewed during this Office Visit: none  (results were independently reviewed by physician and radiology report verified)    PAST MEDICAL, FAMILY AND SOCIAL HISTORY UPDATE:  Past Medical History:   Diagnosis Date    Anxiety     Duodenitis     Fundic gland polyps of stomach, benign     Hyperlipidemia     Hypertension      Past Surgical History:   Procedure Laterality Date    APPENDECTOMY      BACK SURGERY      CHOLECYSTECTOMY      COLONOSCOPY

## 2025-06-18 NOTE — PROGRESS NOTES
Review of Systems   Constitutional:  Negative for chills, fatigue and fever.   Eyes:  Negative for pain, redness and visual disturbance.   Respiratory:  Negative for cough, shortness of breath and wheezing.    Cardiovascular:  Negative for chest pain and leg swelling.   Gastrointestinal:  Negative for abdominal pain, constipation, diarrhea, nausea and vomiting.   Genitourinary:  Positive for difficulty urinating (off flomax). Negative for dysuria, flank pain, frequency, hematuria, scrotal swelling, testicular pain and urgency.   Musculoskeletal:  Negative for back pain, joint swelling and myalgias.   Skin:  Negative for rash and wound.   Neurological:  Negative for dizziness, tremors and numbness.   Hematological:  Does not bruise/bleed easily.

## 2025-06-30 ASSESSMENT — ENCOUNTER SYMPTOMS
BACK PAIN: 0
EYE PAIN: 0
TROUBLE SWALLOWING: 0
WHEEZING: 0
VOMITING: 0
BLOOD IN STOOL: 0
ABDOMINAL PAIN: 0
CHEST TIGHTNESS: 0
NAUSEA: 0
COUGH: 0
CONSTIPATION: 0
VOICE CHANGE: 0
DIARRHEA: 0

## 2025-07-01 ENCOUNTER — OFFICE VISIT (OUTPATIENT)
Dept: PRIMARY CARE CLINIC | Age: 75
End: 2025-07-01
Payer: COMMERCIAL

## 2025-07-01 VITALS
OXYGEN SATURATION: 94 % | HEIGHT: 69 IN | HEART RATE: 67 BPM | DIASTOLIC BLOOD PRESSURE: 64 MMHG | WEIGHT: 269.6 LBS | SYSTOLIC BLOOD PRESSURE: 106 MMHG | BODY MASS INDEX: 39.93 KG/M2

## 2025-07-01 DIAGNOSIS — Z01.818 PRE-OP EVALUATION: ICD-10-CM

## 2025-07-01 DIAGNOSIS — I73.9 PAD (PERIPHERAL ARTERY DISEASE): ICD-10-CM

## 2025-07-01 DIAGNOSIS — M79.89 LEG SWELLING: ICD-10-CM

## 2025-07-01 DIAGNOSIS — I25.10 CORONARY ARTERY DISEASE INVOLVING NATIVE CORONARY ARTERY OF NATIVE HEART WITHOUT ANGINA PECTORIS: Primary | ICD-10-CM

## 2025-07-01 DIAGNOSIS — I10 ESSENTIAL HYPERTENSION: ICD-10-CM

## 2025-07-01 DIAGNOSIS — J44.9 CHRONIC OBSTRUCTIVE PULMONARY DISEASE, UNSPECIFIED COPD TYPE (HCC): ICD-10-CM

## 2025-07-01 DIAGNOSIS — R09.89 CHEST CRACKLES: ICD-10-CM

## 2025-07-01 DIAGNOSIS — N18.1 CKD (CHRONIC KIDNEY DISEASE) STAGE 1, GFR 90 ML/MIN OR GREATER: ICD-10-CM

## 2025-07-01 PROBLEM — R94.31 ABNORMAL EKG: Status: RESOLVED | Noted: 2018-12-06 | Resolved: 2025-07-01

## 2025-07-01 PROCEDURE — 3078F DIAST BP <80 MM HG: CPT | Performed by: PHYSICIAN ASSISTANT

## 2025-07-01 PROCEDURE — 99214 OFFICE O/P EST MOD 30 MIN: CPT | Performed by: PHYSICIAN ASSISTANT

## 2025-07-01 PROCEDURE — 1159F MED LIST DOCD IN RCRD: CPT | Performed by: PHYSICIAN ASSISTANT

## 2025-07-01 PROCEDURE — 1123F ACP DISCUSS/DSCN MKR DOCD: CPT | Performed by: PHYSICIAN ASSISTANT

## 2025-07-01 PROCEDURE — 3074F SYST BP LT 130 MM HG: CPT | Performed by: PHYSICIAN ASSISTANT

## 2025-07-01 RX ORDER — FUROSEMIDE 40 MG/1
40 TABLET ORAL DAILY
Qty: 30 TABLET | Refills: 0 | Status: SHIPPED | OUTPATIENT
Start: 2025-07-01

## 2025-07-01 SDOH — ECONOMIC STABILITY: FOOD INSECURITY: WITHIN THE PAST 12 MONTHS, YOU WORRIED THAT YOUR FOOD WOULD RUN OUT BEFORE YOU GOT MONEY TO BUY MORE.: NEVER TRUE

## 2025-07-01 SDOH — ECONOMIC STABILITY: FOOD INSECURITY: WITHIN THE PAST 12 MONTHS, THE FOOD YOU BOUGHT JUST DIDN'T LAST AND YOU DIDN'T HAVE MONEY TO GET MORE.: NEVER TRUE

## 2025-07-01 ASSESSMENT — ENCOUNTER SYMPTOMS: SHORTNESS OF BREATH: 1

## 2025-07-01 ASSESSMENT — PATIENT HEALTH QUESTIONNAIRE - PHQ9
1. LITTLE INTEREST OR PLEASURE IN DOING THINGS: NOT AT ALL
SUM OF ALL RESPONSES TO PHQ QUESTIONS 1-9: 0
2. FEELING DOWN, DEPRESSED OR HOPELESS: NOT AT ALL
SUM OF ALL RESPONSES TO PHQ QUESTIONS 1-9: 0

## 2025-07-01 NOTE — PROGRESS NOTES
[facility]  [facility]  [address]  [place] [address]  Dept: [phone]    Daquan Mcnamara is a 74 y.o. male who presents today for his medical conditions/complaints as noted below.      Chief Complaint   Patient presents with    Surgical Clearance     Patient is here for a surgical clearance for a meniscus tear repair.       HPI:     HPI  Patient having meniscal repair/possible knee replacement  on  7/17/25           with Dr. Morgan.  He needs this surgery before his achilles tendon repair  Cardiology note 3/2025. Needs clearance  Pulmonary note: 5/1/25. Needs clearance. Can't take Trelegy as it gives him thrush that bleeds.  He does rinse after use.        Pre-op labs: none ordered.      No components found for: \"LDLCHOLESTEROL\", \"LDLCALC\"    (goal LDL is <100)   AST (U/L)   Date Value   05/22/2024 20     ALT (U/L)   Date Value   05/22/2024 24     BUN (mg/dL)   Date Value   05/22/2024 16     BP Readings from Last 3 Encounters:   07/01/25 106/64   06/18/25 102/64   01/08/25 (!) 136/92          (goal 120/80)    Past Medical History:   Diagnosis Date    Anxiety     Duodenitis     Fundic gland polyps of stomach, benign     Hyperlipidemia     Hypertension       Past Surgical History:   Procedure Laterality Date    APPENDECTOMY      BACK SURGERY      CHOLECYSTECTOMY      COLONOSCOPY  03/07/2012    tubular adenoma, hyperplastic polyp    COLONOSCOPY  05/28/2019    JOINT REPLACEMENT Bilateral     Hip repalcement  20 years.    OTHER SURGICAL HISTORY  08/29/2016    Spinal stimulator    SPINAL CORD STIMULATOR SURGERY  08/29/2016    UPPER GASTROINTESTINAL ENDOSCOPY  05/28/2019    UPPER GASTROINTESTINAL ENDOSCOPY  10/15/2020       Family History   Problem Relation Age of Onset    Heart Disease Father     Hypertension Father     Cancer Sister         lung cancer    Hypertension Brother     Stroke Brother     Tuberculosis Brother        Social History     Tobacco Use    Smoking

## 2025-07-02 ENCOUNTER — RESULTS FOLLOW-UP (OUTPATIENT)
Dept: PRIMARY CARE CLINIC | Age: 75
End: 2025-07-02

## 2025-07-02 DIAGNOSIS — R09.89 CHEST CRACKLES: ICD-10-CM

## 2025-07-02 DIAGNOSIS — M79.89 LEG SWELLING: ICD-10-CM

## 2025-07-02 DIAGNOSIS — N18.1 CKD (CHRONIC KIDNEY DISEASE) STAGE 1, GFR 90 ML/MIN OR GREATER: ICD-10-CM

## 2025-07-02 DIAGNOSIS — M79.89 LEG SWELLING: Primary | ICD-10-CM

## 2025-07-02 DIAGNOSIS — Z01.818 PRE-OP EVALUATION: ICD-10-CM

## 2025-07-02 LAB
BUN BLDV-MCNC: NORMAL MG/DL
CALCIUM SERPL-MCNC: NORMAL MG/DL
CHLORIDE BLD-SCNC: NORMAL MMOL/L
CO2: NORMAL
CREAT SERPL-MCNC: NORMAL MG/DL
EGFR: >90
GLUCOSE BLD-MCNC: 107 MG/DL
POTASSIUM SERPL-SCNC: NORMAL MMOL/L
SODIUM BLD-SCNC: NORMAL MMOL/L

## 2025-07-07 DIAGNOSIS — R09.89 CHEST CRACKLES: ICD-10-CM

## 2025-07-07 DIAGNOSIS — M79.89 LEG SWELLING: ICD-10-CM

## 2025-07-07 DIAGNOSIS — I25.10 CORONARY ARTERY DISEASE INVOLVING NATIVE CORONARY ARTERY OF NATIVE HEART WITHOUT ANGINA PECTORIS: ICD-10-CM

## 2025-07-09 ENCOUNTER — TELEPHONE (OUTPATIENT)
Dept: PRIMARY CARE CLINIC | Age: 75
End: 2025-07-09

## 2025-07-21 DIAGNOSIS — R68.2 DRY MOUTH: ICD-10-CM

## 2025-07-22 RX ORDER — PILOCARPINE HYDROCHLORIDE 5 MG/1
5 TABLET, FILM COATED ORAL 3 TIMES DAILY
Qty: 90 TABLET | Refills: 5 | Status: SHIPPED | OUTPATIENT
Start: 2025-07-22

## 2025-07-23 DIAGNOSIS — E78.2 MIXED HYPERLIPIDEMIA: ICD-10-CM

## 2025-07-23 RX ORDER — IPRATROPIUM BROMIDE 42 UG/1
SPRAY, METERED NASAL
Qty: 15 ML | Refills: 0 | Status: SHIPPED | OUTPATIENT
Start: 2025-07-23

## 2025-07-23 RX ORDER — ATORVASTATIN CALCIUM 80 MG/1
80 TABLET, FILM COATED ORAL DAILY
Qty: 90 TABLET | Refills: 0 | Status: SHIPPED | OUTPATIENT
Start: 2025-07-23

## 2025-08-03 DIAGNOSIS — I10 ESSENTIAL HYPERTENSION: ICD-10-CM

## 2025-08-04 RX ORDER — CARVEDILOL 6.25 MG/1
6.25 TABLET ORAL 2 TIMES DAILY
Qty: 180 TABLET | Refills: 1 | Status: SHIPPED | OUTPATIENT
Start: 2025-08-04

## 2025-08-05 ENCOUNTER — OFFICE VISIT (OUTPATIENT)
Dept: PRIMARY CARE CLINIC | Age: 75
End: 2025-08-05
Payer: COMMERCIAL

## 2025-08-05 VITALS
WEIGHT: 271.8 LBS | OXYGEN SATURATION: 96 % | HEART RATE: 62 BPM | HEIGHT: 69 IN | BODY MASS INDEX: 40.26 KG/M2 | DIASTOLIC BLOOD PRESSURE: 72 MMHG | SYSTOLIC BLOOD PRESSURE: 122 MMHG

## 2025-08-05 DIAGNOSIS — R68.2 DRY MOUTH: ICD-10-CM

## 2025-08-05 DIAGNOSIS — F32.89 OTHER DEPRESSION: ICD-10-CM

## 2025-08-05 DIAGNOSIS — Z00.00 MEDICARE ANNUAL WELLNESS VISIT, SUBSEQUENT: Primary | ICD-10-CM

## 2025-08-05 DIAGNOSIS — H25.9 AGE-RELATED CATARACT OF BOTH EYES, UNSPECIFIED AGE-RELATED CATARACT TYPE: ICD-10-CM

## 2025-08-05 DIAGNOSIS — E78.2 MIXED HYPERLIPIDEMIA: ICD-10-CM

## 2025-08-05 DIAGNOSIS — J44.9 CHRONIC OBSTRUCTIVE PULMONARY DISEASE, UNSPECIFIED COPD TYPE (HCC): ICD-10-CM

## 2025-08-05 DIAGNOSIS — R41.3 MEMORY LOSS: ICD-10-CM

## 2025-08-05 DIAGNOSIS — K12.0 APHTHOUS ULCER: ICD-10-CM

## 2025-08-05 DIAGNOSIS — I73.9 PAD (PERIPHERAL ARTERY DISEASE): ICD-10-CM

## 2025-08-05 DIAGNOSIS — M67.971 DISORDER OF RIGHT ACHILLES TENDON: ICD-10-CM

## 2025-08-05 DIAGNOSIS — M47.816 LUMBAR SPONDYLOSIS: ICD-10-CM

## 2025-08-05 DIAGNOSIS — F41.9 ANXIETY: ICD-10-CM

## 2025-08-05 PROCEDURE — 1123F ACP DISCUSS/DSCN MKR DOCD: CPT | Performed by: PHYSICIAN ASSISTANT

## 2025-08-05 PROCEDURE — G0439 PPPS, SUBSEQ VISIT: HCPCS | Performed by: PHYSICIAN ASSISTANT

## 2025-08-05 PROCEDURE — 1159F MED LIST DOCD IN RCRD: CPT | Performed by: PHYSICIAN ASSISTANT

## 2025-08-05 PROCEDURE — 3078F DIAST BP <80 MM HG: CPT | Performed by: PHYSICIAN ASSISTANT

## 2025-08-05 PROCEDURE — 3074F SYST BP LT 130 MM HG: CPT | Performed by: PHYSICIAN ASSISTANT

## 2025-08-05 RX ORDER — CITALOPRAM HYDROBROMIDE 20 MG/1
20 TABLET ORAL DAILY
Qty: 30 TABLET | Refills: 0 | Status: SHIPPED | OUTPATIENT
Start: 2025-08-05

## 2025-08-05 RX ORDER — WHEELCHAIR
EACH MISCELLANEOUS
Qty: 1 EACH | Refills: 0 | Status: SHIPPED | OUTPATIENT
Start: 2025-08-05

## 2025-08-05 RX ORDER — DULOXETIN HYDROCHLORIDE 30 MG/1
30 CAPSULE, DELAYED RELEASE ORAL DAILY
Qty: 30 CAPSULE | Refills: 2 | Status: SHIPPED | OUTPATIENT
Start: 2025-08-05

## 2025-08-05 ASSESSMENT — PATIENT HEALTH QUESTIONNAIRE - PHQ9
SUM OF ALL RESPONSES TO PHQ QUESTIONS 1-9: 0
SUM OF ALL RESPONSES TO PHQ QUESTIONS 1-9: 0
1. LITTLE INTEREST OR PLEASURE IN DOING THINGS: NOT AT ALL
SUM OF ALL RESPONSES TO PHQ QUESTIONS 1-9: 0
2. FEELING DOWN, DEPRESSED OR HOPELESS: NOT AT ALL
SUM OF ALL RESPONSES TO PHQ QUESTIONS 1-9: 0

## 2025-08-05 ASSESSMENT — LIFESTYLE VARIABLES
HOW OFTEN DO YOU HAVE A DRINK CONTAINING ALCOHOL: 2-3 TIMES A WEEK
HOW MANY STANDARD DRINKS CONTAINING ALCOHOL DO YOU HAVE ON A TYPICAL DAY: 1 OR 2

## 2025-08-16 LAB
ALT SERPL-CCNC: 17 U/L (ref 5–41)
AST SERPL-CCNC: 17 U/L (ref 9–50)
CHOLESTEROL, TOTAL: 135 MG/DL (ref 100–199)
CHOLESTEROL/HDL RATIO: 3.1 (ref 2–4.5)
HDLC SERPL-MCNC: 43 MG/DL
LDL CHOLESTEROL: 66 MG/DL
LDL/HDL RATIO: 1.5
TRIGL SERPL-MCNC: 132 MG/DL (ref 20–149)
TSH SERPL DL<=0.05 MIU/L-ACNC: 1.69 UIU/ML (ref 0.27–4.2)
VITAMIN B-12: 518 PG/ML (ref 232–1245)
VLDLC SERPL CALC-MCNC: 26 MG/DL

## 2025-08-24 DIAGNOSIS — K20.80 CORROSIVE ESOPHAGITIS: ICD-10-CM

## 2025-08-25 RX ORDER — OMEPRAZOLE 20 MG/1
CAPSULE, DELAYED RELEASE ORAL DAILY
Qty: 90 CAPSULE | Refills: 0 | Status: SHIPPED | OUTPATIENT
Start: 2025-08-25

## 2025-09-02 ENCOUNTER — OFFICE VISIT (OUTPATIENT)
Dept: PODIATRY | Age: 75
End: 2025-09-02
Payer: COMMERCIAL

## 2025-09-02 VITALS — BODY MASS INDEX: 40.14 KG/M2 | WEIGHT: 271 LBS | HEIGHT: 69 IN

## 2025-09-02 DIAGNOSIS — B35.1 DERMATOPHYTOSIS OF NAIL: Primary | ICD-10-CM

## 2025-09-02 DIAGNOSIS — R60.0 EDEMA OF LOWER EXTREMITY: ICD-10-CM

## 2025-09-02 DIAGNOSIS — M79.604 BILATERAL LOWER EXTREMITY PAIN: ICD-10-CM

## 2025-09-02 DIAGNOSIS — M79.605 BILATERAL LOWER EXTREMITY PAIN: ICD-10-CM

## 2025-09-02 DIAGNOSIS — I73.9 PVD (PERIPHERAL VASCULAR DISEASE): ICD-10-CM

## 2025-09-02 DIAGNOSIS — M19.071 DJD (DEGENERATIVE JOINT DISEASE), ANKLE AND FOOT, RIGHT: ICD-10-CM

## 2025-09-02 PROCEDURE — 11721 DEBRIDE NAIL 6 OR MORE: CPT | Performed by: PODIATRIST

## 2025-09-02 PROCEDURE — 99213 OFFICE O/P EST LOW 20 MIN: CPT | Performed by: PODIATRIST

## 2025-09-02 PROCEDURE — 1125F AMNT PAIN NOTED PAIN PRSNT: CPT | Performed by: PODIATRIST

## 2025-09-02 PROCEDURE — 1159F MED LIST DOCD IN RCRD: CPT | Performed by: PODIATRIST

## 2025-09-02 PROCEDURE — 1123F ACP DISCUSS/DSCN MKR DOCD: CPT | Performed by: PODIATRIST
